# Patient Record
Sex: FEMALE | Race: BLACK OR AFRICAN AMERICAN | NOT HISPANIC OR LATINO | Employment: PART TIME | URBAN - METROPOLITAN AREA
[De-identification: names, ages, dates, MRNs, and addresses within clinical notes are randomized per-mention and may not be internally consistent; named-entity substitution may affect disease eponyms.]

---

## 2017-08-22 ENCOUNTER — RECORDS - HEALTHEAST (OUTPATIENT)
Dept: LAB | Facility: CLINIC | Age: 27
End: 2017-08-22

## 2017-08-23 LAB
CHOLEST SERPL-MCNC: 172 MG/DL
FASTING STATUS PATIENT QL REPORTED: YES
HBA1C MFR BLD: 5.9 % (ref 4.2–6.1)
HDLC SERPL-MCNC: 50 MG/DL
LDLC SERPL CALC-MCNC: 102 MG/DL
TRIGL SERPL-MCNC: 98 MG/DL

## 2017-09-06 ENCOUNTER — AMBULATORY - HEALTHEAST (OUTPATIENT)
Dept: ADMINISTRATIVE | Facility: REHABILITATION | Age: 27
End: 2017-09-06

## 2017-09-06 DIAGNOSIS — R29.898 DECONDITIONED LOW BACK: ICD-10-CM

## 2017-09-12 ENCOUNTER — OFFICE VISIT - HEALTHEAST (OUTPATIENT)
Dept: PHYSICAL THERAPY | Facility: REHABILITATION | Age: 27
End: 2017-09-12

## 2017-09-12 DIAGNOSIS — M62.81 MUSCLE WEAKNESS (GENERALIZED): ICD-10-CM

## 2017-09-12 DIAGNOSIS — G89.29 CHRONIC BILATERAL LOW BACK PAIN WITHOUT SCIATICA: ICD-10-CM

## 2017-09-12 DIAGNOSIS — M54.50 CHRONIC BILATERAL LOW BACK PAIN WITHOUT SCIATICA: ICD-10-CM

## 2017-09-20 ENCOUNTER — OFFICE VISIT - HEALTHEAST (OUTPATIENT)
Dept: PHYSICAL THERAPY | Facility: REHABILITATION | Age: 27
End: 2017-09-20

## 2017-09-20 DIAGNOSIS — M62.81 MUSCLE WEAKNESS (GENERALIZED): ICD-10-CM

## 2017-09-20 DIAGNOSIS — M54.50 CHRONIC BILATERAL LOW BACK PAIN WITHOUT SCIATICA: ICD-10-CM

## 2017-09-20 DIAGNOSIS — G89.29 CHRONIC BILATERAL LOW BACK PAIN WITHOUT SCIATICA: ICD-10-CM

## 2018-01-02 ENCOUNTER — TRANSFERRED RECORDS (OUTPATIENT)
Dept: HEALTH INFORMATION MANAGEMENT | Facility: CLINIC | Age: 28
End: 2018-01-02

## 2018-03-30 ENCOUNTER — TRANSFERRED RECORDS (OUTPATIENT)
Dept: HEALTH INFORMATION MANAGEMENT | Facility: CLINIC | Age: 28
End: 2018-03-30

## 2018-06-28 ENCOUNTER — AMBULATORY - HEALTHEAST (OUTPATIENT)
Dept: LAB | Facility: HOSPITAL | Age: 28
End: 2018-06-28

## 2018-06-28 DIAGNOSIS — Z13.1 SCREENING FOR DIABETES MELLITUS: ICD-10-CM

## 2018-06-28 DIAGNOSIS — Z79.899 HIGH RISK MEDICATIONS (NOT ANTICOAGULANTS) LONG-TERM USE: ICD-10-CM

## 2018-06-28 DIAGNOSIS — F33.1 MAJOR DEPRESSIVE DISORDER, RECURRENT EPISODE, MODERATE (H): ICD-10-CM

## 2018-06-28 LAB
ALBUMIN SERPL-MCNC: 3.7 G/DL (ref 3.5–5)
ALP SERPL-CCNC: 63 U/L (ref 45–120)
ALT SERPL W P-5'-P-CCNC: 16 U/L (ref 0–45)
AMMONIA PLAS-SCNC: 36 UMOL/L (ref 11–35)
AMYLASE SERPL-CCNC: 62 U/L (ref 5–120)
AST SERPL W P-5'-P-CCNC: 21 U/L (ref 0–40)
BASOPHILS # BLD AUTO: 0 THOU/UL (ref 0–0.2)
BASOPHILS NFR BLD AUTO: 0 % (ref 0–2)
BILIRUB DIRECT SERPL-MCNC: 0.1 MG/DL
BILIRUB SERPL-MCNC: 0.4 MG/DL (ref 0–1)
CHOLEST SERPL-MCNC: 190 MG/DL
EOSINOPHIL # BLD AUTO: 0.1 THOU/UL (ref 0–0.4)
EOSINOPHIL NFR BLD AUTO: 2 % (ref 0–6)
ERYTHROCYTE [DISTWIDTH] IN BLOOD BY AUTOMATED COUNT: 14.3 % (ref 11–14.5)
FASTING STATUS PATIENT QL REPORTED: YES
FASTING STATUS PATIENT QL REPORTED: YES
GLUCOSE BLD-MCNC: 76 MG/DL (ref 70–125)
HCT VFR BLD AUTO: 39.7 % (ref 35–47)
HDLC SERPL-MCNC: 52 MG/DL
HGB BLD-MCNC: 13.3 G/DL (ref 12–16)
LDLC SERPL CALC-MCNC: 114 MG/DL
LYMPHOCYTES # BLD AUTO: 2.6 THOU/UL (ref 0.8–4.4)
LYMPHOCYTES NFR BLD AUTO: 30 % (ref 20–40)
MCH RBC QN AUTO: 27.7 PG (ref 27–34)
MCHC RBC AUTO-ENTMCNC: 33.5 G/DL (ref 32–36)
MCV RBC AUTO: 83 FL (ref 80–100)
MONOCYTES # BLD AUTO: 0.5 THOU/UL (ref 0–0.9)
MONOCYTES NFR BLD AUTO: 6 % (ref 2–10)
NEUTROPHILS # BLD AUTO: 5.5 THOU/UL (ref 2–7.7)
NEUTROPHILS NFR BLD AUTO: 63 % (ref 50–70)
PLATELET # BLD AUTO: 230 THOU/UL (ref 140–440)
PMV BLD AUTO: 9.4 FL (ref 8.5–12.5)
PROLACTIN SERPL-MCNC: 5.4 NG/ML (ref 0–20)
PROT SERPL-MCNC: 7.5 G/DL (ref 6–8)
RBC # BLD AUTO: 4.8 MILL/UL (ref 3.8–5.4)
TRIGL SERPL-MCNC: 119 MG/DL
VALPROATE SERPL-MCNC: 74.7 UG/ML (ref 50–150)
WBC: 8.8 THOU/UL (ref 4–11)

## 2018-06-29 LAB — HBA1C MFR BLD: 5.6 % (ref 4.2–6.1)

## 2018-08-07 ENCOUNTER — TRANSFERRED RECORDS (OUTPATIENT)
Dept: HEALTH INFORMATION MANAGEMENT | Facility: CLINIC | Age: 28
End: 2018-08-07

## 2018-09-04 ENCOUNTER — RECORDS - HEALTHEAST (OUTPATIENT)
Dept: LAB | Facility: CLINIC | Age: 28
End: 2018-09-04

## 2018-09-04 ENCOUNTER — TRANSFERRED RECORDS (OUTPATIENT)
Dept: HEALTH INFORMATION MANAGEMENT | Facility: CLINIC | Age: 28
End: 2018-09-04

## 2018-09-04 ENCOUNTER — MEDICAL CORRESPONDENCE (OUTPATIENT)
Dept: HEALTH INFORMATION MANAGEMENT | Facility: CLINIC | Age: 28
End: 2018-09-04

## 2018-09-05 LAB — BACTERIA SPEC CULT: NORMAL

## 2018-10-08 NOTE — TELEPHONE ENCOUNTER
FUTURE VISIT INFORMATION      FUTURE VISIT INFORMATION:    Date: 10/23/2018    Time: 1:00    Location: [facility] [place]  REFERRAL INFORMATION:    Referring provider:  Dominique Madrigal    Referring providers clinic:  [facility]    Reason for visit/diagnosis  CONNIE    RECORDS REQUESTED FROM:       Clinic name Comments Records Status Imaging Status   ENTIRA OFFICE VISIT: 09/04/2018  SLEEP STUDY: 01/02/2018 EXTERNAL NONE   [facility] OFF[facility] VISIT: 10/08/2018 08/07/2018,05/08/2018,02/20/2018,  SLEEP STUDY: 03/30/2018 INTERNAL/  EXTERNAL NONE                             RECORDS STATUS    PUT RECORDS IN HIM BAG TO GET SCANNED.

## 2018-10-08 NOTE — TELEPHONE ENCOUNTER
Phone Call:     Contact Name [facility]   Outcome CALLED CLINIC WE GOT RECORDS NO REFERRAL ... PER CLINIC THEY NEVER REFERRED THIS PT TO US NOR DO THEY HAVE A DR BY THE NAME WALI VALDERRAMA

## 2018-10-08 NOTE — TELEPHONE ENCOUNTER
Phone Call:     Contact Name CALLED PT   Outcome LEFT PT A VM THAT WE WILL NEED A ENT REFERRAL IF WE DON'T GET ONE BY THE TIME OF YOUR APPT WE WILL NEED TO CANCEL THIS APPT     ERROR IF PT CALLS BACK DISREGARD INTAKE REP PUT WRONG CLINIC FOR DR VALDERRAMA... SHE IS WAS AT [facility] PER CLINIC SHE IS NO LONGER HERE BUT WILL FAX REFERRAL AND RECORDS

## 2018-10-23 ENCOUNTER — PRE VISIT (OUTPATIENT)
Dept: OTOLARYNGOLOGY | Facility: CLINIC | Age: 28
End: 2018-10-23

## 2018-11-20 ENCOUNTER — RECORDS - HEALTHEAST (OUTPATIENT)
Dept: ADMINISTRATIVE | Facility: OTHER | Age: 28
End: 2018-11-20

## 2018-12-11 ENCOUNTER — AMBULATORY - HEALTHEAST (OUTPATIENT)
Dept: LAB | Facility: HOSPITAL | Age: 28
End: 2018-12-11

## 2018-12-11 DIAGNOSIS — Z79.899 NEED FOR PROPHYLACTIC CHEMOTHERAPY: ICD-10-CM

## 2018-12-11 LAB
ALBUMIN SERPL-MCNC: 3.7 G/DL (ref 3.5–5)
ALP SERPL-CCNC: 54 U/L (ref 45–120)
ALT SERPL W P-5'-P-CCNC: 19 U/L (ref 0–45)
AMYLASE SERPL-CCNC: 90 U/L (ref 5–120)
AST SERPL W P-5'-P-CCNC: 19 U/L (ref 0–40)
BASOPHILS # BLD AUTO: 0 THOU/UL (ref 0–0.2)
BASOPHILS NFR BLD AUTO: 0 % (ref 0–2)
BILIRUB DIRECT SERPL-MCNC: <0.1 MG/DL
BILIRUB SERPL-MCNC: 0.2 MG/DL (ref 0–1)
EOSINOPHIL # BLD AUTO: 0.1 THOU/UL (ref 0–0.4)
EOSINOPHIL NFR BLD AUTO: 1 % (ref 0–6)
ERYTHROCYTE [DISTWIDTH] IN BLOOD BY AUTOMATED COUNT: 13.9 % (ref 11–14.5)
GGT SERPL-CCNC: 35 U/L (ref 0–50)
HCT VFR BLD AUTO: 39.2 % (ref 35–47)
HGB BLD-MCNC: 13.2 G/DL (ref 12–16)
LYMPHOCYTES # BLD AUTO: 2.3 THOU/UL (ref 0.8–4.4)
LYMPHOCYTES NFR BLD AUTO: 32 % (ref 20–40)
MCH RBC QN AUTO: 28 PG (ref 27–34)
MCHC RBC AUTO-ENTMCNC: 33.7 G/DL (ref 32–36)
MCV RBC AUTO: 83 FL (ref 80–100)
MONOCYTES # BLD AUTO: 0.6 THOU/UL (ref 0–0.9)
MONOCYTES NFR BLD AUTO: 8 % (ref 2–10)
NEUTROPHILS # BLD AUTO: 4.2 THOU/UL (ref 2–7.7)
NEUTROPHILS NFR BLD AUTO: 59 % (ref 50–70)
PLATELET # BLD AUTO: 228 THOU/UL (ref 140–440)
PMV BLD AUTO: 9.5 FL (ref 8.5–12.5)
PROT SERPL-MCNC: 6.9 G/DL (ref 6–8)
RBC # BLD AUTO: 4.71 MILL/UL (ref 3.8–5.4)
VALPROATE SERPL-MCNC: 78.5 UG/ML (ref 50–150)
WBC: 7.2 THOU/UL (ref 4–11)

## 2018-12-12 ASSESSMENT — MIFFLIN-ST. JEOR: SCORE: 1440.27

## 2018-12-13 ENCOUNTER — SURGERY - HEALTHEAST (OUTPATIENT)
Dept: SURGERY | Facility: HOSPITAL | Age: 28
End: 2018-12-13

## 2018-12-13 ENCOUNTER — ANESTHESIA - HEALTHEAST (OUTPATIENT)
Dept: SURGERY | Facility: HOSPITAL | Age: 28
End: 2018-12-13

## 2019-01-17 ENCOUNTER — OFFICE VISIT (OUTPATIENT)
Dept: OTOLARYNGOLOGY | Facility: CLINIC | Age: 29
End: 2019-01-17
Payer: MEDICARE

## 2019-01-17 VITALS — BODY MASS INDEX: 32.1 KG/M2 | WEIGHT: 170 LBS | HEIGHT: 61 IN

## 2019-01-17 DIAGNOSIS — G47.33 OSA (OBSTRUCTIVE SLEEP APNEA): Primary | ICD-10-CM

## 2019-01-17 PROBLEM — F79 INTELLECTUAL DISABILITY: Status: ACTIVE | Noted: 2017-09-14

## 2019-01-17 PROBLEM — F39 PSYCHOSIS, AFFECTIVE (H): Status: ACTIVE | Noted: 2017-09-14

## 2019-01-17 PROBLEM — F39 EPISODIC MOOD DISORDER (H): Status: ACTIVE | Noted: 2017-09-17

## 2019-01-17 PROBLEM — Z02.9 ADMINISTRATIVE ENCOUNTER: Status: ACTIVE | Noted: 2017-09-18

## 2019-01-17 PROBLEM — S09.90XA INJURY OF HEAD: Status: ACTIVE | Noted: 2017-09-16
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
## 2019-01-17 PROBLEM — F33.42 MAJOR DEPRESSION, RECURRENT, FULL REMISSION (H): Status: ACTIVE | Noted: 2019-01-17

## 2019-01-17 PROBLEM — F33.3 DEPRESSION, MAJOR, RECURRENT, SEVERE WITH PSYCHOSIS (H): Status: ACTIVE | Noted: 2017-09-14

## 2019-01-17 PROBLEM — R45.851 SUICIDAL IDEATION: Status: ACTIVE | Noted: 2017-09-13

## 2019-01-17 PROBLEM — F32.9 MAJOR DEPRESSIVE DISORDER: Status: ACTIVE | Noted: 2018-07-20

## 2019-01-17 RX ORDER — PRAZOSIN HYDROCHLORIDE 2 MG/1
2 CAPSULE ORAL
Status: ON HOLD | COMMUNITY
Start: 2017-07-21 | End: 2020-12-27

## 2019-01-17 RX ORDER — SERTRALINE HYDROCHLORIDE 100 MG/1
200 TABLET, FILM COATED ORAL AT BEDTIME
Status: ON HOLD | COMMUNITY
End: 2020-12-30

## 2019-01-17 RX ORDER — SENNA AND DOCUSATE SODIUM 50; 8.6 MG/1; MG/1
1 TABLET, FILM COATED ORAL AT BEDTIME
Status: ON HOLD | COMMUNITY
End: 2020-12-30

## 2019-01-17 RX ORDER — ALBUTEROL SULFATE 90 UG/1
2 AEROSOL, METERED RESPIRATORY (INHALATION) EVERY 4 HOURS PRN
Status: ON HOLD | COMMUNITY
End: 2020-12-30

## 2019-01-17 RX ORDER — PANTOPRAZOLE SODIUM 40 MG/1
40 TABLET, DELAYED RELEASE ORAL
Status: ON HOLD | COMMUNITY
Start: 2017-07-21 | End: 2020-12-30

## 2019-01-17 RX ORDER — MONTELUKAST SODIUM 10 MG/1
10 TABLET ORAL
Status: ON HOLD | COMMUNITY
Start: 2017-07-21 | End: 2020-12-30

## 2019-01-17 RX ORDER — DIVALPROEX SODIUM 500 MG/1
2000 TABLET, DELAYED RELEASE ORAL AT BEDTIME
Status: ON HOLD | COMMUNITY
End: 2020-12-30

## 2019-01-17 RX ORDER — ARIPIPRAZOLE 20 MG/1
30 TABLET ORAL AT BEDTIME
Status: ON HOLD | COMMUNITY
End: 2020-12-30

## 2019-01-17 RX ORDER — KETOCONAZOLE 20 MG/G
1 CREAM TOPICAL 2 TIMES DAILY PRN
COMMUNITY
Start: 2017-07-21

## 2019-01-17 RX ORDER — MEDROXYPROGESTERONE ACETATE 150 MG/ML
150 INJECTION, SUSPENSION INTRAMUSCULAR
Status: ON HOLD | COMMUNITY
End: 2020-12-30

## 2019-01-17 RX ORDER — ASPIRIN 81 MG
TABLET, DELAYED RELEASE (ENTERIC COATED) ORAL
Status: ON HOLD | COMMUNITY
Start: 2019-01-02 | End: 2020-12-27

## 2019-01-17 ASSESSMENT — MIFFLIN-ST. JEOR: SCORE: 1433.49

## 2019-01-17 ASSESSMENT — PATIENT HEALTH QUESTIONNAIRE - PHQ9
SUM OF ALL RESPONSES TO PHQ QUESTIONS 1-9: 10
10. IF YOU CHECKED OFF ANY PROBLEMS, HOW DIFFICULT HAVE THESE PROBLEMS MADE IT FOR YOU TO DO YOUR WORK, TAKE CARE OF THINGS AT HOME, OR GET ALONG WITH OTHER PEOPLE: SOMEWHAT DIFFICULT
SUM OF ALL RESPONSES TO PHQ QUESTIONS 1-9: 10

## 2019-01-17 NOTE — PROGRESS NOTES
SLEEP SURGERY CONSULTATION    Patient: Michelle Person (Sina)  : 1990  CHIEF COMPLAINT: CONNIE    HPI:  Michelle Person is a 28 year old year old female who has Obstructive Sleep Apnea.  Her biggest complaint about her sleep apnea is that she has fatigue and tiredness during the day.  She had a sleep study performed on 2018.  The overall sleep apnea was 6.4.  Supine AHI was 6.8.  Nonsupine AHI was 4.6.  She did not have any central events.  She slept 80% of the night on her back.  Patient did try CPAP.  She try it for approximately 2 months.  However she found the mass to be very hot and claustrophobic.  Eventually she did not meet criteria and therefore the CPAP machine was taken back.  Patient reports that she does like to sleep mostly on her back but she does toss and turn quite a bit through the night.  She does have some nasal congestion.  She is used Flonase in the past but only when she has had colds.        PAST MEDICAL HISTORY:  Developmental delay    PAST SURGICAL HISTORY:  No past surgical history on file.    MEDICATIONS:  Current Outpatient Medications   Medication Sig Dispense Refill     fluticasone (ARNUITY ELLIPTA) 100 MCG/ACT inhaler Inhale 1 puff into the lungs       ketoconazole (NIZORAL) 2 % external cream Apply 1 Application topically       montelukast (SINGULAIR) 10 MG tablet Take 10 mg by mouth       Omega-3 Fatty Acids (FISH OIL PO) Take 2 capsules by mouth       pantoprazole (PROTONIX) 40 MG EC tablet Take 40 mg by mouth       prazosin (MINIPRESS) 2 MG capsule Take 2 mg by mouth       albuterol (PROAIR HFA) 108 (90 Base) MCG/ACT inhaler Inhale 2 puffs into the lungs       ARIPiprazole (ABILIFY) 20 MG tablet Take 20 mg by mouth       cholecalciferol (VITAMIN D-1000 MAX ST) 1000 units TABS Take 1,000 Units by mouth       divalproex sodium delayed-release (DEPAKOTE) 500 MG DR tablet Take 500 mg by mouth       medroxyPROGESTERone (DEPO-PROVERA) 150 MG/ML IM injection Inject 150 mg  "into the muscle       SENNA PLUS 8.6-50 MG tablet        SENNA-docusate sodium (SENNA S) 8.6-50 MG tablet Take 1 tablet by mouth       sertraline (ZOLOFT) 100 MG tablet Take 200 mg by mouth         ALLERGIES:  Allergies   Allergen Reactions     Tomato Unknown       SOCIAL HISTORY:  Social History     Socioeconomic History     Marital status: Single     Spouse name: Not on file     Number of children: Not on file     Years of education: Not on file     Highest education level: Not on file   Social Needs     Financial resource strain: Not on file     Food insecurity - worry: Not on file     Food insecurity - inability: Not on file     Transportation needs - medical: Not on file     Transportation needs - non-medical: Not on file   Occupational History     Not on file   Tobacco Use     Smoking status: Not on file   Substance and Sexual Activity     Alcohol use: Not on file     Drug use: Not on file     Sexual activity: Not on file   Other Topics Concern     Not on file   Social History Narrative     Not on file       FAMILY HISTORY:  No family history on file.    REVIEW OF SYSTEMS:  [facility] E[facility] ROS 1/17/2019   Constitutional Weight loss   Neurology Headache   Psychology Frequently feeling depressed or sad, Frequently feeling anxious   Ears, Nose, Throat Nasal congestion or drainage, Sore throat, Trouble swallowing, Hoarseness   Cardiopulmonary Cough, Breathing problems, Wheezing, Chest pain   Gastrointestinal/Genitourinary Heartburn/indigestion, Pain with urination, Constipation   Musculoskeletal Back pain, Swollen legs/feet   Endocrine Thirst, Heat or cold intolerance, Frequent urination         PHYSICAL EXAM  Ht 1.549 m (5' 1\")   Wt 77.1 kg (170 lb)   BMI 32.12 kg/m      Constitutional: healthy, alert and no distress  ENT:   NOSE: Septum is fairly midline with a slight deviation to the left.  Turbinates are moderately hypertrophied today.  MOUTH:   Modified Jac Jessica 4.  Tonsils are 2+.  She does have a narrow hard " palate.    ASSESSMENT:  1.  Mild obstructive sleep apnea,  untreated      PLAN:  I discussed with the patient alternative management options for obstructive sleep apnea if one is not able to use CPAP.  For her her options include positional therapy, oral appliance therapy, or surgery.  In terms of surgery potentially we could do turbinate reductions and a soft palate procedure.  However based off her anatomy I suspect that the surgical success rate would be around the 50% range.  She might do well with a oral appliance given the mildness of her sleep apnea.  She was not interested in positional therapy at this time.  After discussion with the patient and her  patient is electing to try an oral appliance first before considering surgery.  I did make a referral for her to several dentist who are able to accept Medicare.  I spent 35 minutes face-to-face with Michelle Person during today's office visit, of which more than 50% was spent on counseling and coordination of care, which included discussion of pathophysiology of patient's obstructive sleep apnea, treatment options, risks and benefits of each option.

## 2019-01-17 NOTE — LETTER
2019       RE: Michelle Person  [address]  [facility] [address]     Dear Colleague,    Thank you for referring your patient, Michelle Person, to the [facility] at [facility]. Please see a copy of my visit note below.        SLEEP SURGERY CONSULTATION    Patient: Michelle Person (Sina)  : 1990  CHIEF COMPLAINT: CONNIE    HPI:  Michelle Person is a 28 year old year old female who has Obstructive Sleep Apnea.  Her biggest complaint about her sleep apnea is that she has fatigue and tiredness during the day.  She had a sleep study performed on 2018.  The overall sleep apnea was 6.4.  Supine AHI was 6.8.  Nonsupine AHI was 4.6.  She did not have any central events.  She slept 80% of the night on her back.  Patient did try CPAP.  She try it for approximately 2 months.  However she found the mass to be very hot and claustrophobic.  Eventually she did not meet criteria and therefore the CPAP machine was taken back.  Patient reports that she does like to sleep mostly on her back but she does toss and turn quite a bit through the night.  She does have some nasal congestion.  She is used Flonase in the past but only when she has had colds.        PAST MEDICAL HISTORY:  Developmental delay    PAST SURGICAL HISTORY:  No past surgical history on file.    MEDICATIONS:  Current Outpatient Medications   Medication Sig Dispense Refill     fluticasone (ARNUITY ELLIPTA) 100 MCG/ACT inhaler Inhale 1 puff into the lungs       ketoconazole (NIZORAL) 2 % external cream Apply 1 Application topically       montelukast (SINGULAIR) 10 MG tablet Take 10 mg by mouth       Omega-3 Fatty Acids (FISH OIL PO) Take 2 capsules by mouth       pantoprazole (PROTONIX) 40 MG EC tablet Take 40 mg by mouth       prazosin (MINIPRESS) 2 MG capsule Take 2 mg by mouth       albuterol (PROAIR HFA) 108 (90 Base) MCG/ACT inhaler Inhale 2 puffs into the lungs       ARIPiprazole (ABILIFY) 20  "MG tablet Take 20 mg by mouth       cholecalciferol (VITAMIN D-1000 MAX ST) 1000 units TABS Take 1,000 Units by mouth       divalproex sodium delayed-release (DEPAKOTE) 500 MG DR tablet Take 500 mg by mouth       medroxyPROGESTERone (DEPO-PROVERA) 150 MG/ML IM injection Inject 150 mg into the muscle       SENNA PLUS 8.6-50 MG tablet        SENNA-docusate sodium (SENNA S) 8.6-50 MG tablet Take 1 tablet by mouth       sertraline (ZOLOFT) 100 MG tablet Take 200 mg by mouth         ALLERGIES:  Allergies   Allergen Reactions     Tomato Unknown       SOCIAL HISTORY:  Social History     Socioeconomic History     Marital status: Single     Spouse name: Not on file     Number of children: Not on file     Years of education: Not on file     Highest education level: Not on file   Social Needs     Financial resource strain: Not on file     Food insecurity - worry: Not on file     Food insecurity - inability: Not on file     Transportation needs - medical: Not on file     Transportation needs - non-medical: Not on file   Occupational History     Not on file   Tobacco Use     Smoking status: Not on file   Substance and Sexual Activity     Alcohol use: Not on file     Drug use: Not on file     Sexual activity: Not on file   Other Topics Concern     Not on file   Social History Narrative     Not on file       FAMILY HISTORY:  No family history on file.    REVIEW OF SYSTEMS:  [facility] E[facility] ROS 1/17/2019   Constitutional Weight loss   Neurology Headache   Psychology Frequently feeling depressed or sad, Frequently feeling anxious   Ears, Nose, Throat Nasal congestion or drainage, Sore throat, Trouble swallowing, Hoarseness   Cardiopulmonary Cough, Breathing problems, Wheezing, Chest pain   Gastrointestinal/Genitourinary Heartburn/indigestion, Pain with urination, Constipation   Musculoskeletal Back pain, Swollen legs/feet   Endocrine Thirst, Heat or cold intolerance, Frequent urination         PHYSICAL EXAM  Ht 1.549 m (5' 1\")   Wt 77.1 kg " (170 lb)   BMI 32.12 kg/m       Constitutional: healthy, alert and no distress  ENT:   NOSE: Septum is fairly midline with a slight deviation to the left.  Turbinates are moderately hypertrophied today.  MOUTH:   Modified Jac Jessica 4.  Tonsils are 2+.  She does have a narrow hard palate.    ASSESSMENT:  1.  Mild obstructive sleep apnea,  untreated      PLAN:  I discussed with the patient alternative management options for obstructive sleep apnea if one is not able to use CPAP.  For her her options include positional therapy, oral appliance therapy, or surgery.  In terms of surgery potentially we could do turbinate reductions and a soft palate procedure.  However based off her anatomy I suspect that the surgical success rate would be around the 50% range.  She might do well with a oral appliance given the mildness of her sleep apnea.  She was not interested in positional therapy at this time.  After discussion with the patient and her  patient is electing to try an oral appliance first before considering surgery.  I did make a referral for her to several dentist who are able to accept Medicare.  I spent 35 minutes face-to-face with Michelle Person during today's office visit, of which more than 50% was spent on counseling and coordination of care, which included discussion of pathophysiology of patient's obstructive sleep apnea, treatment options, risks and benefits of each option.          Again, thank you for allowing me to participate in the care of your patient.      Sincerely,    Dorota Adams MD

## 2019-01-18 ASSESSMENT — PATIENT HEALTH QUESTIONNAIRE - PHQ9: SUM OF ALL RESPONSES TO PHQ QUESTIONS 1-9: 10

## 2019-02-14 ENCOUNTER — TRANSFERRED RECORDS (OUTPATIENT)
Dept: HEALTH INFORMATION MANAGEMENT | Facility: CLINIC | Age: 29
End: 2019-02-14

## 2019-03-19 ENCOUNTER — DOCUMENTATION ONLY (OUTPATIENT)
Dept: OTOLARYNGOLOGY | Facility: CLINIC | Age: 29
End: 2019-03-19

## 2019-03-19 NOTE — PROGRESS NOTES
Called [facility] to confirm fax was received. [facility] confirms they received documents.     Yadira Haddad RN

## 2019-06-06 ENCOUNTER — AMBULATORY - HEALTHEAST (OUTPATIENT)
Dept: LAB | Facility: HOSPITAL | Age: 29
End: 2019-06-06

## 2019-06-06 DIAGNOSIS — Z79.899 HISTORY OF LONG-TERM TREATMENT WITH HIGH-RISK MEDICATION: ICD-10-CM

## 2019-06-06 DIAGNOSIS — Z13.21 SCREENING FOR MALNUTRITION: ICD-10-CM

## 2019-06-06 LAB
ALBUMIN SERPL-MCNC: 3.7 G/DL (ref 3.5–5)
ALP SERPL-CCNC: 46 U/L (ref 45–120)
ALT SERPL W P-5'-P-CCNC: 19 U/L (ref 0–45)
AMYLASE SERPL-CCNC: 58 U/L (ref 5–120)
AST SERPL W P-5'-P-CCNC: 19 U/L (ref 0–40)
BASOPHILS # BLD AUTO: 0 THOU/UL (ref 0–0.2)
BASOPHILS NFR BLD AUTO: 0 % (ref 0–2)
BILIRUB DIRECT SERPL-MCNC: 0.1 MG/DL
BILIRUB SERPL-MCNC: 0.3 MG/DL (ref 0–1)
CHOLEST SERPL-MCNC: 166 MG/DL
EOSINOPHIL # BLD AUTO: 0.1 THOU/UL (ref 0–0.4)
EOSINOPHIL NFR BLD AUTO: 2 % (ref 0–6)
ERYTHROCYTE [DISTWIDTH] IN BLOOD BY AUTOMATED COUNT: 13.9 % (ref 11–14.5)
FASTING STATUS PATIENT QL REPORTED: YES
FASTING STATUS PATIENT QL REPORTED: YES
GGT SERPL-CCNC: 26 U/L (ref 0–50)
GLUCOSE BLD-MCNC: 77 MG/DL (ref 70–125)
HCT VFR BLD AUTO: 41.7 % (ref 35–47)
HDLC SERPL-MCNC: 52 MG/DL
HGB BLD-MCNC: 13.7 G/DL (ref 12–16)
LDLC SERPL CALC-MCNC: 97 MG/DL
LYMPHOCYTES # BLD AUTO: 2.5 THOU/UL (ref 0.8–4.4)
LYMPHOCYTES NFR BLD AUTO: 34 % (ref 20–40)
MCH RBC QN AUTO: 27.8 PG (ref 27–34)
MCHC RBC AUTO-ENTMCNC: 32.9 G/DL (ref 32–36)
MCV RBC AUTO: 85 FL (ref 80–100)
MONOCYTES # BLD AUTO: 0.6 THOU/UL (ref 0–0.9)
MONOCYTES NFR BLD AUTO: 8 % (ref 2–10)
NEUTROPHILS # BLD AUTO: 4 THOU/UL (ref 2–7.7)
NEUTROPHILS NFR BLD AUTO: 56 % (ref 50–70)
PLATELET # BLD AUTO: 229 THOU/UL (ref 140–440)
PMV BLD AUTO: 9.1 FL (ref 8.5–12.5)
PROT SERPL-MCNC: 6.9 G/DL (ref 6–8)
RBC # BLD AUTO: 4.93 MILL/UL (ref 3.8–5.4)
T4 FREE SERPL-MCNC: 0.8 NG/DL (ref 0.7–1.8)
TRIGL SERPL-MCNC: 86 MG/DL
TSH SERPL DL<=0.005 MIU/L-ACNC: 4.1 UIU/ML (ref 0.3–5)
VALPROATE SERPL-MCNC: 68.2 UG/ML (ref 50–150)
WBC: 7.2 THOU/UL (ref 4–11)

## 2019-06-07 LAB — HBA1C MFR BLD: 5.8 % (ref 4.2–6.1)

## 2019-09-06 ENCOUNTER — AMBULATORY - HEALTHEAST (OUTPATIENT)
Dept: LAB | Facility: HOSPITAL | Age: 29
End: 2019-09-06

## 2019-09-06 DIAGNOSIS — Z11.1 SCREENING EXAMINATION FOR PULMONARY TUBERCULOSIS: ICD-10-CM

## 2019-09-11 LAB
GAMMA INTERFERON BACKGROUND BLD IA-ACNC: 0.05 IU/ML
M TB IFN-G BLD-IMP: NEGATIVE
MITOGEN IGNF BCKGRD COR BLD-ACNC: 0 IU/ML
MITOGEN IGNF BCKGRD COR BLD-ACNC: 0.01 IU/ML
QTF INTERPRETATION: NORMAL
QTF MITOGEN - NIL: >10 IU/ML

## 2019-12-09 ENCOUNTER — RECORDS - HEALTHEAST (OUTPATIENT)
Dept: ADMINISTRATIVE | Facility: OTHER | Age: 29
End: 2019-12-09

## 2020-01-03 ENCOUNTER — AMBULATORY - HEALTHEAST (OUTPATIENT)
Dept: LAB | Facility: HOSPITAL | Age: 30
End: 2020-01-03

## 2020-01-03 DIAGNOSIS — Z79.899 HISTORY OF LONG-TERM TREATMENT WITH HIGH-RISK MEDICATION: ICD-10-CM

## 2020-01-03 LAB
ALBUMIN SERPL-MCNC: 3.4 G/DL (ref 3.5–5)
ALP SERPL-CCNC: 47 U/L (ref 45–120)
ALT SERPL W P-5'-P-CCNC: 24 U/L (ref 0–45)
AMYLASE SERPL-CCNC: 58 U/L (ref 5–120)
AST SERPL W P-5'-P-CCNC: 25 U/L (ref 0–40)
BASOPHILS # BLD AUTO: 0 THOU/UL (ref 0–0.2)
BASOPHILS NFR BLD AUTO: 0 % (ref 0–2)
BILIRUB DIRECT SERPL-MCNC: 0.1 MG/DL
BILIRUB SERPL-MCNC: 0.3 MG/DL (ref 0–1)
EOSINOPHIL # BLD AUTO: 0.1 THOU/UL (ref 0–0.4)
EOSINOPHIL NFR BLD AUTO: 1 % (ref 0–6)
ERYTHROCYTE [DISTWIDTH] IN BLOOD BY AUTOMATED COUNT: 14 % (ref 11–14.5)
HCT VFR BLD AUTO: 40.9 % (ref 35–47)
HGB BLD-MCNC: 13.2 G/DL (ref 12–16)
LYMPHOCYTES # BLD AUTO: 2.4 THOU/UL (ref 0.8–4.4)
LYMPHOCYTES NFR BLD AUTO: 38 % (ref 20–40)
MCH RBC QN AUTO: 28 PG (ref 27–34)
MCHC RBC AUTO-ENTMCNC: 32.3 G/DL (ref 32–36)
MCV RBC AUTO: 87 FL (ref 80–100)
MONOCYTES # BLD AUTO: 0.5 THOU/UL (ref 0–0.9)
MONOCYTES NFR BLD AUTO: 8 % (ref 2–10)
NEUTROPHILS # BLD AUTO: 3.3 THOU/UL (ref 2–7.7)
NEUTROPHILS NFR BLD AUTO: 52 % (ref 50–70)
PLATELET # BLD AUTO: 279 THOU/UL (ref 140–440)
PMV BLD AUTO: 8.7 FL (ref 8.5–12.5)
PROT SERPL-MCNC: 6.6 G/DL (ref 6–8)
RBC # BLD AUTO: 4.71 MILL/UL (ref 3.8–5.4)
VALPROATE SERPL-MCNC: 86.7 UG/ML (ref 50–150)
WBC: 6.3 THOU/UL (ref 4–11)

## 2020-07-22 ENCOUNTER — AMBULATORY - HEALTHEAST (OUTPATIENT)
Dept: LAB | Facility: HOSPITAL | Age: 30
End: 2020-07-22

## 2020-07-22 DIAGNOSIS — Z79.899 ENCOUNTER FOR LONG-TERM (CURRENT) USE OF HIGH-RISK MEDICATION: ICD-10-CM

## 2020-07-22 LAB
ALBUMIN SERPL-MCNC: 3.8 G/DL (ref 3.5–5)
ALP SERPL-CCNC: 49 U/L (ref 45–120)
ALT SERPL W P-5'-P-CCNC: 21 U/L (ref 0–45)
AMYLASE SERPL-CCNC: 62 U/L (ref 5–120)
AST SERPL W P-5'-P-CCNC: 20 U/L (ref 0–40)
BASOPHILS # BLD AUTO: 0 THOU/UL (ref 0–0.2)
BASOPHILS NFR BLD AUTO: 0 % (ref 0–2)
BILIRUB DIRECT SERPL-MCNC: 0.1 MG/DL
BILIRUB SERPL-MCNC: 0.2 MG/DL (ref 0–1)
EOSINOPHIL # BLD AUTO: 0.2 THOU/UL (ref 0–0.4)
EOSINOPHIL NFR BLD AUTO: 2 % (ref 0–6)
ERYTHROCYTE [DISTWIDTH] IN BLOOD BY AUTOMATED COUNT: 14.4 % (ref 11–14.5)
GGT SERPL-CCNC: 37 U/L (ref 0–50)
HCT VFR BLD AUTO: 43.1 % (ref 35–47)
HGB BLD-MCNC: 14 G/DL (ref 12–16)
LYMPHOCYTES # BLD AUTO: 3 THOU/UL (ref 0.8–4.4)
LYMPHOCYTES NFR BLD AUTO: 35 % (ref 20–40)
MCH RBC QN AUTO: 28.3 PG (ref 27–34)
MCHC RBC AUTO-ENTMCNC: 32.5 G/DL (ref 32–36)
MCV RBC AUTO: 87 FL (ref 80–100)
MONOCYTES # BLD AUTO: 0.6 THOU/UL (ref 0–0.9)
MONOCYTES NFR BLD AUTO: 7 % (ref 2–10)
NEUTROPHILS # BLD AUTO: 4.5 THOU/UL (ref 2–7.7)
NEUTROPHILS NFR BLD AUTO: 54 % (ref 50–70)
PLATELET # BLD AUTO: 193 THOU/UL (ref 140–440)
PMV BLD AUTO: 9.5 FL (ref 8.5–12.5)
PROT SERPL-MCNC: 6.9 G/DL (ref 6–8)
PROT SERPL-MCNC: 6.9 G/DL (ref 6–8)
RBC # BLD AUTO: 4.94 MILL/UL (ref 3.8–5.4)
VALPROATE SERPL-MCNC: 92.6 UG/ML (ref 50–150)
WBC: 8.4 THOU/UL (ref 4–11)

## 2020-08-24 ENCOUNTER — COMMUNICATION - HEALTHEAST (OUTPATIENT)
Dept: SCHEDULING | Facility: CLINIC | Age: 30
End: 2020-08-24

## 2020-08-25 ENCOUNTER — AMBULATORY - HEALTHEAST (OUTPATIENT)
Dept: CARE COORDINATION | Facility: CLINIC | Age: 30
End: 2020-08-25

## 2020-08-25 DIAGNOSIS — A41.9 SEPSIS WITH ACUTE HYPOXIC RESPIRATORY FAILURE WITHOUT SEPTIC SHOCK, DUE TO UNSPECIFIED ORGANISM (H): ICD-10-CM

## 2020-08-25 DIAGNOSIS — R65.20 SEPSIS WITH ACUTE HYPOXIC RESPIRATORY FAILURE WITHOUT SEPTIC SHOCK, DUE TO UNSPECIFIED ORGANISM (H): ICD-10-CM

## 2020-08-25 DIAGNOSIS — J96.01 SEPSIS WITH ACUTE HYPOXIC RESPIRATORY FAILURE WITHOUT SEPTIC SHOCK, DUE TO UNSPECIFIED ORGANISM (H): ICD-10-CM

## 2020-09-01 ENCOUNTER — COMMUNICATION - HEALTHEAST (OUTPATIENT)
Dept: NURSING | Facility: CLINIC | Age: 30
End: 2020-09-01

## 2020-09-02 ENCOUNTER — RECORDS - HEALTHEAST (OUTPATIENT)
Dept: LAB | Facility: CLINIC | Age: 30
End: 2020-09-02

## 2020-09-03 LAB
ANION GAP SERPL CALCULATED.3IONS-SCNC: 13 MMOL/L (ref 5–18)
BUN SERPL-MCNC: 14 MG/DL (ref 8–22)
CALCIUM SERPL-MCNC: 9.3 MG/DL (ref 8.5–10.5)
CHLORIDE BLD-SCNC: 107 MMOL/L (ref 98–107)
CO2 SERPL-SCNC: 21 MMOL/L (ref 22–31)
CREAT SERPL-MCNC: 1.36 MG/DL (ref 0.6–1.1)
GFR SERPL CREATININE-BSD FRML MDRD: 45 ML/MIN/1.73M2
GLUCOSE BLD-MCNC: 100 MG/DL (ref 70–125)
POTASSIUM BLD-SCNC: 5.2 MMOL/L (ref 3.5–5)
SODIUM SERPL-SCNC: 141 MMOL/L (ref 136–145)

## 2020-09-08 ENCOUNTER — COMMUNICATION - HEALTHEAST (OUTPATIENT)
Dept: CARE COORDINATION | Facility: CLINIC | Age: 30
End: 2020-09-08

## 2020-09-08 ASSESSMENT — ACTIVITIES OF DAILY LIVING (ADL)
DEPENDENT_IADLS:: CLEANING;COOKING;LAUNDRY;SHOPPING;MONEY MANAGEMENT;MEDICATION MANAGEMENT;MEAL PREPARATION;TRANSPORTATION

## 2020-09-22 ENCOUNTER — COMMUNICATION - HEALTHEAST (OUTPATIENT)
Dept: CARE COORDINATION | Facility: CLINIC | Age: 30
End: 2020-09-22

## 2020-10-06 ENCOUNTER — COMMUNICATION - HEALTHEAST (OUTPATIENT)
Dept: CARE COORDINATION | Facility: CLINIC | Age: 30
End: 2020-10-06

## 2020-10-07 ENCOUNTER — RECORDS - HEALTHEAST (OUTPATIENT)
Dept: LAB | Facility: CLINIC | Age: 30
End: 2020-10-07

## 2020-10-07 LAB
ANION GAP SERPL CALCULATED.3IONS-SCNC: 7 MMOL/L (ref 5–18)
BUN SERPL-MCNC: 17 MG/DL (ref 8–22)
CALCIUM SERPL-MCNC: 8.9 MG/DL (ref 8.5–10.5)
CHLORIDE BLD-SCNC: 102 MMOL/L (ref 98–107)
CO2 SERPL-SCNC: 28 MMOL/L (ref 22–31)
CREAT SERPL-MCNC: 0.94 MG/DL (ref 0.6–1.1)
GFR SERPL CREATININE-BSD FRML MDRD: >60 ML/MIN/1.73M2
GLUCOSE BLD-MCNC: 73 MG/DL (ref 70–125)
POTASSIUM BLD-SCNC: 4.2 MMOL/L (ref 3.5–5)
SODIUM SERPL-SCNC: 137 MMOL/L (ref 136–145)

## 2020-10-13 ENCOUNTER — COMMUNICATION - HEALTHEAST (OUTPATIENT)
Dept: SCHEDULING | Facility: CLINIC | Age: 30
End: 2020-10-13

## 2020-12-26 ENCOUNTER — AMBULATORY - HEALTHEAST (OUTPATIENT)
Dept: BEHAVIORAL HEALTH | Facility: CLINIC | Age: 30
End: 2020-12-26

## 2020-12-26 ENCOUNTER — HOSPITAL ENCOUNTER (INPATIENT)
Facility: CLINIC | Age: 30
LOS: 6 days | Discharge: NURSING, REHAB OR LONG-TERM CARE | DRG: 885 | End: 2021-01-01
Attending: PSYCHIATRY & NEUROLOGY | Admitting: PSYCHIATRY & NEUROLOGY
Payer: MEDICARE

## 2020-12-26 ENCOUNTER — TRANSFERRED RECORDS (OUTPATIENT)
Dept: HEALTH INFORMATION MANAGEMENT | Facility: CLINIC | Age: 30
End: 2020-12-26

## 2020-12-26 DIAGNOSIS — G40.909 SEIZURE DISORDER (H): ICD-10-CM

## 2020-12-26 DIAGNOSIS — K59.03 DRUG-INDUCED CONSTIPATION: ICD-10-CM

## 2020-12-26 DIAGNOSIS — R45.851 SUICIDAL IDEATION: ICD-10-CM

## 2020-12-26 DIAGNOSIS — N32.81 OVERACTIVE BLADDER: ICD-10-CM

## 2020-12-26 DIAGNOSIS — J45.909 UNCOMPLICATED ASTHMA, UNSPECIFIED ASTHMA SEVERITY, UNSPECIFIED WHETHER PERSISTENT: Primary | ICD-10-CM

## 2020-12-26 DIAGNOSIS — F39 PSYCHOSIS, AFFECTIVE (H): ICD-10-CM

## 2020-12-26 DIAGNOSIS — E55.9 VITAMIN D DEFICIENCY: ICD-10-CM

## 2020-12-26 DIAGNOSIS — Z30.9 ENCOUNTER FOR CONTRACEPTIVE MANAGEMENT, UNSPECIFIED TYPE: ICD-10-CM

## 2020-12-26 DIAGNOSIS — K27.9 PEPTIC ULCER: ICD-10-CM

## 2020-12-26 DIAGNOSIS — F33.3 DEPRESSION, MAJOR, RECURRENT, SEVERE WITH PSYCHOSIS (H): ICD-10-CM

## 2020-12-26 LAB
CREAT SERPL-MCNC: 0.94 MG/DL (ref 0.6–1.1)
GFR SERPL CREATININE-BSD FRML MDRD: >60 ML/MIN/1.73M2
GLUCOSE SERPL-MCNC: 98 MG/DL (ref 70–125)
POTASSIUM SERPL-SCNC: 4.4 MMOL/L (ref 3.5–5)

## 2020-12-26 PROCEDURE — 250N000013 HC RX MED GY IP 250 OP 250 PS 637: Performed by: STUDENT IN AN ORGANIZED HEALTH CARE EDUCATION/TRAINING PROGRAM

## 2020-12-26 PROCEDURE — 124N000002 HC R&B MH UMMC

## 2020-12-26 RX ORDER — ACETAMINOPHEN 325 MG/1
650 TABLET ORAL EVERY 4 HOURS PRN
Status: DISCONTINUED | OUTPATIENT
Start: 2020-12-26 | End: 2021-01-01 | Stop reason: HOSPADM

## 2020-12-26 RX ORDER — OLANZAPINE 10 MG/2ML
10 INJECTION, POWDER, FOR SOLUTION INTRAMUSCULAR 3 TIMES DAILY PRN
Status: DISCONTINUED | OUTPATIENT
Start: 2020-12-26 | End: 2021-01-01 | Stop reason: HOSPADM

## 2020-12-26 RX ORDER — ARIPIPRAZOLE 10 MG/1
30 TABLET ORAL AT BEDTIME
Status: DISCONTINUED | OUTPATIENT
Start: 2020-12-26 | End: 2020-12-27

## 2020-12-26 RX ORDER — DIVALPROEX SODIUM 500 MG/1
500 TABLET, DELAYED RELEASE ORAL
Status: DISCONTINUED | OUTPATIENT
Start: 2020-12-27 | End: 2020-12-27

## 2020-12-26 RX ORDER — LANOLIN ALCOHOL/MO/W.PET/CERES
3 CREAM (GRAM) TOPICAL
Status: DISCONTINUED | OUTPATIENT
Start: 2020-12-26 | End: 2021-01-01 | Stop reason: HOSPADM

## 2020-12-26 RX ORDER — MAGNESIUM HYDROXIDE/ALUMINUM HYDROXICE/SIMETHICONE 120; 1200; 1200 MG/30ML; MG/30ML; MG/30ML
30 SUSPENSION ORAL EVERY 4 HOURS PRN
Status: DISCONTINUED | OUTPATIENT
Start: 2020-12-26 | End: 2021-01-01 | Stop reason: HOSPADM

## 2020-12-26 RX ORDER — POLYETHYLENE GLYCOL 3350 17 G/17G
17 POWDER, FOR SOLUTION ORAL DAILY PRN
Status: DISCONTINUED | OUTPATIENT
Start: 2020-12-26 | End: 2021-01-01 | Stop reason: HOSPADM

## 2020-12-26 RX ORDER — OLANZAPINE 10 MG/1
10 TABLET ORAL 3 TIMES DAILY PRN
Status: DISCONTINUED | OUTPATIENT
Start: 2020-12-26 | End: 2021-01-01 | Stop reason: HOSPADM

## 2020-12-26 RX ORDER — VITAMIN B COMPLEX
1000 TABLET ORAL DAILY
Status: DISCONTINUED | OUTPATIENT
Start: 2020-12-27 | End: 2021-01-01 | Stop reason: HOSPADM

## 2020-12-26 RX ORDER — HYDROXYZINE HYDROCHLORIDE 25 MG/1
25 TABLET, FILM COATED ORAL EVERY 4 HOURS PRN
Status: DISCONTINUED | OUTPATIENT
Start: 2020-12-26 | End: 2021-01-01 | Stop reason: HOSPADM

## 2020-12-26 RX ORDER — DIVALPROEX SODIUM 500 MG/1
1500 TABLET, DELAYED RELEASE ORAL AT BEDTIME
Status: DISCONTINUED | OUTPATIENT
Start: 2020-12-26 | End: 2020-12-27

## 2020-12-26 RX ORDER — ALBUTEROL SULFATE 90 UG/1
2 AEROSOL, METERED RESPIRATORY (INHALATION) DAILY PRN
Status: DISCONTINUED | OUTPATIENT
Start: 2020-12-26 | End: 2020-12-31

## 2020-12-26 RX ORDER — PANTOPRAZOLE SODIUM 40 MG/1
40 TABLET, DELAYED RELEASE ORAL DAILY
Status: DISCONTINUED | OUTPATIENT
Start: 2020-12-27 | End: 2021-01-01 | Stop reason: HOSPADM

## 2020-12-26 RX ORDER — SERTRALINE HYDROCHLORIDE 100 MG/1
200 TABLET, FILM COATED ORAL AT BEDTIME
Status: DISCONTINUED | OUTPATIENT
Start: 2020-12-26 | End: 2020-12-27

## 2020-12-26 RX ADMIN — DIVALPROEX SODIUM 1500 MG: 500 TABLET, DELAYED RELEASE ORAL at 22:52

## 2020-12-26 RX ADMIN — SERTRALINE HYDROCHLORIDE 200 MG: 100 TABLET ORAL at 22:53

## 2020-12-26 RX ADMIN — ARIPIPRAZOLE 30 MG: 10 TABLET ORAL at 22:53

## 2020-12-27 LAB
ALBUMIN SERPL-MCNC: 3 G/DL (ref 3.4–5)
ALP SERPL-CCNC: 44 U/L (ref 40–150)
ALT SERPL W P-5'-P-CCNC: 18 U/L (ref 0–50)
ANION GAP SERPL CALCULATED.3IONS-SCNC: 4 MMOL/L (ref 3–14)
AST SERPL W P-5'-P-CCNC: 14 U/L (ref 0–45)
BILIRUB SERPL-MCNC: 0.2 MG/DL (ref 0.2–1.3)
BUN SERPL-MCNC: 17 MG/DL (ref 7–30)
CALCIUM SERPL-MCNC: 8.8 MG/DL (ref 8.5–10.1)
CHLORIDE SERPL-SCNC: 108 MMOL/L (ref 94–109)
CO2 SERPL-SCNC: 29 MMOL/L (ref 20–32)
CREAT SERPL-MCNC: 0.9 MG/DL (ref 0.52–1.04)
ERYTHROCYTE [DISTWIDTH] IN BLOOD BY AUTOMATED COUNT: 12.8 % (ref 10–15)
GFR SERPL CREATININE-BSD FRML MDRD: 85 ML/MIN/{1.73_M2}
GLUCOSE SERPL-MCNC: 79 MG/DL (ref 70–99)
HCT VFR BLD AUTO: 35.3 % (ref 35–47)
HGB BLD-MCNC: 11.7 G/DL (ref 11.7–15.7)
MCH RBC QN AUTO: 28.2 PG (ref 26.5–33)
MCHC RBC AUTO-ENTMCNC: 33.1 G/DL (ref 31.5–36.5)
MCV RBC AUTO: 85 FL (ref 78–100)
PLATELET # BLD AUTO: 238 10E9/L (ref 150–450)
POTASSIUM SERPL-SCNC: 4.7 MMOL/L (ref 3.4–5.3)
PROT SERPL-MCNC: 6.4 G/DL (ref 6.8–8.8)
RBC # BLD AUTO: 4.15 10E12/L (ref 3.8–5.2)
SODIUM SERPL-SCNC: 141 MMOL/L (ref 133–144)
TSH SERPL DL<=0.005 MIU/L-ACNC: 3.48 MU/L (ref 0.4–4)
WBC # BLD AUTO: 8.7 10E9/L (ref 4–11)

## 2020-12-27 PROCEDURE — 36415 COLL VENOUS BLD VENIPUNCTURE: CPT | Performed by: STUDENT IN AN ORGANIZED HEALTH CARE EDUCATION/TRAINING PROGRAM

## 2020-12-27 PROCEDURE — 85027 COMPLETE CBC AUTOMATED: CPT | Performed by: STUDENT IN AN ORGANIZED HEALTH CARE EDUCATION/TRAINING PROGRAM

## 2020-12-27 PROCEDURE — 99223 1ST HOSP IP/OBS HIGH 75: CPT | Mod: AI | Performed by: PSYCHIATRY & NEUROLOGY

## 2020-12-27 PROCEDURE — 84443 ASSAY THYROID STIM HORMONE: CPT | Performed by: STUDENT IN AN ORGANIZED HEALTH CARE EDUCATION/TRAINING PROGRAM

## 2020-12-27 PROCEDURE — 93005 ELECTROCARDIOGRAM TRACING: CPT

## 2020-12-27 PROCEDURE — 124N000002 HC R&B MH UMMC

## 2020-12-27 PROCEDURE — 250N000013 HC RX MED GY IP 250 OP 250 PS 637: Performed by: STUDENT IN AN ORGANIZED HEALTH CARE EDUCATION/TRAINING PROGRAM

## 2020-12-27 PROCEDURE — 80053 COMPREHEN METABOLIC PANEL: CPT | Performed by: STUDENT IN AN ORGANIZED HEALTH CARE EDUCATION/TRAINING PROGRAM

## 2020-12-27 RX ORDER — KETOCONAZOLE 20 MG/G
CREAM TOPICAL 2 TIMES DAILY PRN
Status: DISCONTINUED | OUTPATIENT
Start: 2020-12-27 | End: 2021-01-01 | Stop reason: HOSPADM

## 2020-12-27 RX ORDER — DIVALPROEX SODIUM 500 MG/1
1500 TABLET, DELAYED RELEASE ORAL AT BEDTIME
Status: DISCONTINUED | OUTPATIENT
Start: 2020-12-27 | End: 2020-12-28

## 2020-12-27 RX ORDER — DIVALPROEX SODIUM 500 MG/1
1000 TABLET, DELAYED RELEASE ORAL AT BEDTIME
Status: DISCONTINUED | OUTPATIENT
Start: 2020-12-27 | End: 2020-12-27

## 2020-12-27 RX ORDER — SENNOSIDES 8.6 MG
8.6 TABLET ORAL 2 TIMES DAILY PRN
Status: DISCONTINUED | OUTPATIENT
Start: 2020-12-27 | End: 2021-01-01 | Stop reason: HOSPADM

## 2020-12-27 RX ORDER — CHLORAL HYDRATE 500 MG
1 CAPSULE ORAL DAILY
Status: DISCONTINUED | OUTPATIENT
Start: 2020-12-27 | End: 2020-12-28

## 2020-12-27 RX ORDER — SERTRALINE HYDROCHLORIDE 100 MG/1
100 TABLET, FILM COATED ORAL AT BEDTIME
Status: DISCONTINUED | OUTPATIENT
Start: 2020-12-27 | End: 2020-12-28

## 2020-12-27 RX ORDER — OXYBUTYNIN CHLORIDE 15 MG/1
15 TABLET, EXTENDED RELEASE ORAL EVERY MORNING
Status: ON HOLD | COMMUNITY
End: 2020-12-30

## 2020-12-27 RX ORDER — DOCUSATE SODIUM 100 MG/1
100 CAPSULE, LIQUID FILLED ORAL 2 TIMES DAILY
Status: DISCONTINUED | OUTPATIENT
Start: 2020-12-27 | End: 2020-12-27

## 2020-12-27 RX ORDER — MONTELUKAST SODIUM 10 MG/1
10 TABLET ORAL AT BEDTIME
Status: DISCONTINUED | OUTPATIENT
Start: 2020-12-27 | End: 2021-01-01 | Stop reason: HOSPADM

## 2020-12-27 RX ORDER — DIVALPROEX SODIUM 500 MG/1
2000 TABLET, DELAYED RELEASE ORAL AT BEDTIME
Status: DISCONTINUED | OUTPATIENT
Start: 2020-12-27 | End: 2020-12-27

## 2020-12-27 RX ORDER — DOCUSATE SODIUM 100 MG/1
100 CAPSULE, LIQUID FILLED ORAL DAILY
Status: DISCONTINUED | OUTPATIENT
Start: 2020-12-27 | End: 2021-01-01 | Stop reason: HOSPADM

## 2020-12-27 RX ADMIN — PANTOPRAZOLE SODIUM 40 MG: 40 TABLET, DELAYED RELEASE ORAL at 09:40

## 2020-12-27 RX ADMIN — DOCUSATE SODIUM 100 MG: 100 CAPSULE, LIQUID FILLED ORAL at 09:41

## 2020-12-27 RX ADMIN — Medication 1 G: at 12:54

## 2020-12-27 RX ADMIN — Medication 1000 UNITS: at 09:40

## 2020-12-27 RX ADMIN — FLUTICASONE FUROATE 1 PUFF: 100 POWDER RESPIRATORY (INHALATION) at 12:54

## 2020-12-27 ASSESSMENT — ACTIVITIES OF DAILY LIVING (ADL)
HYGIENE/GROOMING: INDEPENDENT
DRESS: INDEPENDENT
ORAL_HYGIENE: INDEPENDENT

## 2020-12-27 NOTE — SIGNIFICANT EVENT
This writer answered a phone call from the pt's group home regarding contact information for the supervisor. Was told by  staff that the supervisor is the go- to for updates etc.  Supervisor's name is Amanda and her direct number is [phone].  Writer had the pt sign an STEPHEN.

## 2020-12-27 NOTE — PROGRESS NOTES
Pt appeared to have comfortably slept a total of 7 hours. No PRNs or snacks given or requested this shift. Remained in her room the entire shift. Will continue to monitor and offer support.
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
## 2020-12-27 NOTE — PHARMACY-ADMISSION MEDICATION HISTORY
Admission Medication History status for the 12/26/2020 admission is complete.  See EPIC admission navigator for Prior to Admission medications.    Medication history sources:  Group home at [phone]( spoke with Maco) and [facility] pharmacy at  [phone]( Spoke with MARILEE Ellis)    Medication history source reliability: Good    Medication adherence:  Good     Changes made to PTA medication list (reason)  Added:   1. Oxybutynin ER 15 mg po every morning   Deleted:   1.prazosin (MINIPRESS) 2 MG capsule( discontinued as of jun 2019)  Changed:   1.ARIPiprazole (ABILIFY) 20 MG tablet daily- changed to 30 mg at bedtime.  2.divalproex sodium delayed-release (DEPAKOTE) 500 MG DR tablet daily -changed to 2000 mg qhs  3.Omega-3 Fatty Acids (FISH OIL PO) daily- changed to tid  4.fluticasone (ARNUITY ELLIPTA) 100 MCG/ACT inhaler daily   - changed to Flovent Diskus bid     Additional medication history information (including reliability of information, actions taken by pharmacist): None    Time spent in this activity: 30 minutes     Medication history completed by: Marina Ceballos, Pharm.D    Prior to Admission medications    Medication Sig Last Dose Taking? Auth Provider   fluticasone (FLOVENT DISKUS) 100 MCG/BLIST inhaler Inhale 1 puff into the lungs every 12 hours 12/26/2020 at 0800 Yes Unknown, Entered By History   oxybutynin ER (DITROPAN XL) 15 MG 24 hr tablet Take 15 mg by mouth every morning 12/26/2020 at 0800 Yes Unknown, Entered By History   albuterol (PROAIR HFA) 108 (90 Base) MCG/ACT inhaler Inhale 2 puffs into the lungs every 4 hours as needed for shortness of breath / dyspnea    Reported, Patient   ARIPiprazole (ABILIFY) 20 MG tablet Take 30 mg by mouth At Bedtime  12/25/2020 at 2000  Reported, Patient   cholecalciferol (VITAMIN D-1000 MAX ST) 1000 units TABS Take 1,000 Units by mouth 12/26/2020 at 0800  Reported, Patient   divalproex sodium delayed-release (DEPAKOTE) 500 MG DR tablet Take 2,000 mg by  mouth At Bedtime  12/25/2020 at 2000  Reported, Patient   ketoconazole (NIZORAL) 2 % external cream Apply 1 Application topically 2 times daily as needed Apply to skin fold   Reported, Patient   medroxyPROGESTERone (DEPO-PROVERA) 150 MG/ML IM injection Inject 150 mg into the muscle every 3 months Last injection was on 12/10/20 per group home   Reported, Patient   montelukast (SINGULAIR) 10 MG tablet Take 10 mg by mouth 12/25/2020 at 2000  Reported, Patient   Omega-3 Fatty Acids (FISH OIL PO) Take 1 capsule by mouth 3 times daily  12/26/2020  Reported, Patient   pantoprazole (PROTONIX) 40 MG EC tablet Take 40 mg by mouth 12/26/2020 at 0800  Reported, Patient   SENNA-docusate sodium (SENNA S) 8.6-50 MG tablet Take 1 tablet by mouth At Bedtime  12/25/2020 at 2000  Reported, Patient   sertraline (ZOLOFT) 100 MG tablet Take 200 mg by mouth At Bedtime  12/25/2020 at 2000  Reported, Patient
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
## 2020-12-27 NOTE — PLAN OF CARE
"Has been sleeping most of the day.  Easily arouses with verbal stimuli. Spent some time in the lounge area listening to music with headphones.  Reports mood a being \"okay\" ,\"fine\".  Denies suicidal thoughts and thoughts of self harm, contracts for safety.  Has been calm and cooperative.    1300  C/o indigestion and acid reflux after taking her fish oil medication.  Had small emesis the stated that she was feeling better.  Tolerated lunch.    "

## 2020-12-27 NOTE — PROGRESS NOTES
12/26/20 2223   Patient Belongings   Patient Belongings locker   Patient Belongings Put in Hospital Secure Location (Security or Locker, etc.) plastic bag;clothing   Belongings Search Yes   Clothing Search Yes   Second Staff Mayte CAMPBELL RN.     Patient belongings in locker:  Plastic bag sealed with black duct tape (unopened, per patient request)   Socks  Underwear     A               Admission:  I am responsible for any personal items that are not sent to the safe or pharmacy.  [facility] is not responsible for loss, theft or damage of any property in my possession.    Signature:  _________________________________ Date: _______  Time: _____                                              Staff Signature:  ____________________________ Date: ________  Time: _____      2nd Staff person, if patient is unable/unwilling to sign:    Signature: ________________________________ Date: ________  Time: _____     Discharge:  [facility] has returned all of my personal belongings:    Signature: _________________________________ Date: ________  Time: _____                                          Staff Signature:  ____________________________ Date: ________  Time: _____

## 2020-12-27 NOTE — PLAN OF CARE
Pt was admitted to unit from [facility]. She  to her group home staff that she was going to hurt herself. She was going to use a plastic knife or push her chair off the two story deck She has been pleasant and cooperative since arriving on the unit. She denied any SI and was able to contract for safety. She has been pleasant, calm and cooperative.

## 2020-12-28 PROCEDURE — 250N000013 HC RX MED GY IP 250 OP 250 PS 637: Performed by: STUDENT IN AN ORGANIZED HEALTH CARE EDUCATION/TRAINING PROGRAM

## 2020-12-28 PROCEDURE — 99232 SBSQ HOSP IP/OBS MODERATE 35: CPT | Mod: GC | Performed by: PSYCHIATRY & NEUROLOGY

## 2020-12-28 PROCEDURE — 124N000002 HC R&B MH UMMC

## 2020-12-28 RX ORDER — CHLORAL HYDRATE 500 MG
1 CAPSULE ORAL 3 TIMES DAILY
Status: DISCONTINUED | OUTPATIENT
Start: 2020-12-28 | End: 2021-01-01 | Stop reason: HOSPADM

## 2020-12-28 RX ORDER — DIVALPROEX SODIUM 500 MG/1
2000 TABLET, DELAYED RELEASE ORAL AT BEDTIME
Status: DISCONTINUED | OUTPATIENT
Start: 2020-12-28 | End: 2021-01-01 | Stop reason: HOSPADM

## 2020-12-28 RX ORDER — OXYBUTYNIN CHLORIDE 5 MG/1
15 TABLET, EXTENDED RELEASE ORAL DAILY
Status: DISCONTINUED | OUTPATIENT
Start: 2020-12-29 | End: 2021-01-01 | Stop reason: HOSPADM

## 2020-12-28 RX ORDER — ARIPIPRAZOLE 10 MG/1
20 TABLET ORAL AT BEDTIME
Status: DISCONTINUED | OUTPATIENT
Start: 2020-12-28 | End: 2020-12-29

## 2020-12-28 RX ADMIN — PANTOPRAZOLE SODIUM 40 MG: 40 TABLET, DELAYED RELEASE ORAL at 09:09

## 2020-12-28 RX ADMIN — Medication 1000 UNITS: at 09:09

## 2020-12-28 RX ADMIN — DIVALPROEX SODIUM 2000 MG: 500 TABLET, DELAYED RELEASE ORAL at 22:20

## 2020-12-28 RX ADMIN — DOCUSATE SODIUM 100 MG: 100 CAPSULE, LIQUID FILLED ORAL at 09:09

## 2020-12-28 RX ADMIN — Medication 1 G: at 14:19

## 2020-12-28 RX ADMIN — FLUTICASONE FUROATE 1 PUFF: 100 POWDER RESPIRATORY (INHALATION) at 09:10

## 2020-12-28 RX ADMIN — Medication 1 G: at 22:20

## 2020-12-28 RX ADMIN — OLANZAPINE 10 MG: 10 TABLET, FILM COATED ORAL at 09:10

## 2020-12-28 RX ADMIN — ACETAMINOPHEN 650 MG: 325 TABLET, FILM COATED ORAL at 09:09

## 2020-12-28 RX ADMIN — MONTELUKAST 10 MG: 10 TABLET, FILM COATED ORAL at 22:21

## 2020-12-28 RX ADMIN — ARIPIPRAZOLE 20 MG: 10 TABLET ORAL at 22:20

## 2020-12-28 RX ADMIN — SERTRALINE HYDROCHLORIDE 150 MG: 100 TABLET ORAL at 22:21

## 2020-12-28 RX ADMIN — Medication 1 G: at 09:09

## 2020-12-28 ASSESSMENT — ACTIVITIES OF DAILY LIVING (ADL)
ORAL_HYGIENE: INDEPENDENT
HYGIENE/GROOMING: INDEPENDENT
ORAL_HYGIENE: INDEPENDENT
HYGIENE/GROOMING: INDEPENDENT
DRESS: INDEPENDENT
DRESS: INDEPENDENT;SCRUBS (BEHAVIORAL HEALTH)

## 2020-12-28 NOTE — PROGRESS NOTES
"  ----------------------------------------------------------------------------------------------------------  [facility], [place]   Psychiatric Progress Note  Hospital Day #2     Interim History:   The patient's care was discussed with the treatment team and chart notes were reviewed.    Sleep 7 hours (20 0600)  Scheduled Medications: took all scheduled medications as prescribed   PRN medications: no psychiatric PRNs given     Staff Report: Michelle has been cooperative.    Patient Interview:   Michelle Person was interviewed remotely via teleconference. She endorsed suicidal ideation with a plan and intent to die as a cause for her being in the hospital. Her mood was \"sad and depressed\". She reported that she is her own guardian. She has been living at her group home for 3 years and she liked it there so far. She reported that she gets along with her house mates. She reported that she has been thinking more about the loss of her grandmother and this precipitated his worsening depression and suicidal ideation. She tried to cut herself with a plastic knife because she wanted to die. She was upset that she could not have her jessi bear, the one she sleeps with. We reassured that she will get it back as soon as she is discharged. The jessi neumann name is azam. Physically she was feeling achy, tired and confused.  Denied any changes in sleep and appetite. She has  poor sleep at baseline. Patient today said her grandmother  a year ago, per H&P it was 15 years ago.    The risks, benefits, alternatives and side effects of any medication changes have been discussed and are understood by the patient and other caregivers.    Review of systems:     ROS was negative unless noted above.          Allergies:     Allergies   Allergen Reactions     Ibuprofen Other (See Comments)     Until cleared by primary MD d/t kidney function     Tomato Unknown            Psychiatric Examination:   /73  " " Pulse 76   Temp 97.5  F (36.4  C) (Oral)   Resp 16   Wt 77.9 kg (171 lb 11.2 oz)   SpO2 97%   BMI 32.44 kg/m    Weight is 171 lbs 11.2 oz  Body mass index is 32.44 kg/m .    MENTAL STATUS EXAM    Appearance:  no apparent distress, obese, appears stated age and in hospital scrubs  Attitude:  cooperative and apathetic  Psychomotor:  normal and no evidence of tics, dystonia, or tardive dyskinesia  Eye Contact: downcast  Speech:  decreased rate and increased latency, otherwise clear  Mood: \"sad and depressed confused and tired\"  Affect:  congruent and restricted  Thought Content: active suicidal ideation  Thought Process: confused, incoherent and somewhat loss of abstract thinking  Sensorium: awake  Cognition: average remote memory, average recent past memory, average concentration, average abstraction, impaired fund of information, average language ability, average attention span and average intelligence  Impulse control: poor  Insight: fair  Judgment: questionable         Labs:     Results for orders placed or performed during the hospital encounter of 12/26/20 (from the past 24 hour(s))   EKG 12-lead, tracing only   Result Value Ref Range    Interpretation ECG Click View Image link to view waveform and result         Assessment    Principal Diagnosis:   # Major Depressive Disorder, severe, with psychotic features     Secondary psychiatric diagnoses of concern this admission:   - PTSD by history   - Mild intellectual disability per chart      Diagnostic Impression:   31 y/o AAF with mild intellectual disability and major depression with psychotic features admitted from group home after attempting to cut herself with plastic silverware or jump off a second floor balcony.  Patient identifies anniversary of grandmother's death 15 years ago as the trigger for mood worsening in the past 2 weeks and nonadherence with medication for several days.  Medications restarted at half previous dose except for Depakote  1500 " mg at bedtime, as patient has diagnosed seizure disorder.  Gold team to titrate medications back to previous doses if tolerated and deemed appropriate, need to contact group home and patient's mother to identify extent of recent behavioral change or other stressors to determine if this is a relapse of her mood disorder or impulsive act of emotional dysregulation.  Patient has history of previous suicide gestures leading to brief hospitalizations with return to previous residence.    Psychiatric Hospital course:   Michelle Person was admitted to [facility] as a voluntary patient. Medications restarted at half previous dose except for Depakote DR 1500 mg at bedtime, as patient has diagnosed seizure disorder.  Patient was cooperative in the milieu. On 12/28 This author attempted to contact Dr. Collins, left a message. PTA aripiprazole increased to 20 mg, zoloft increased to 150, depakote increased to 2000 mg today.          Discontinued Medications (& Rationale):  none    Medical course   Admission labs as below:    Data:   Laboratory/Imaging:  -COVID-19: Negative  -Beta hCG: Negative  -Blood alcohol: None detected  -Valproic acid level: 59.0 mcg/mL  -CBC: Within normal limits  -CMP: Notable for albumin low at 3.0 and protein low at 4.6.  All other values within normal limits.   - ECG: QTc: 402 ms, sinus rhythm with sinus arrhythmia, nonspecific ST and T wave abnormality.    Consults:   none    Plan     Today's Changes:  - Zoloft 150 mg   - Depakote 2000 mg at bedtime -seizure disorder, increased to PTA dose  - Abilify 20 mg    Psychotropic Medications:  Scheduled Psych Medications:  - zoloft  - depakote  - abilify    PRN Psych Medications  - Hydroxyzine 25 mg PRN Q4H  - Olanzapine 10 mg PO/IM prn Q2H severe agitation/psychosis  - Trazodone 50 mg PRN QHS    Patient will be treated in therapeutic milieu with appropriate individual and group therapies as described.    Medical diagnoses to be addressed this admission:           #.  History of seizure disorder  - Continue valproate, consider increasing to PTA dose if appropriate     #. Asthma, unspecified  - Continue PTA fluticasone, montelukast,albuterol PRN     # Urinary incontinence, unspecified  - Continue PTA oxybutynin     Legal Status:   Orders Placed This Encounter      Voluntary      Safety Assessment:   Behavioral Orders   Procedures     Code 1 - Restrict to Unit     Fall precautions     Routine Programming     As clinically indicated     Self Injury Precaution     Status 15     Every 15 minutes.     Suicide precautions     Patients on Suicide Precautions should have a Combination Diet ordered that includes a Diet selection(s) AND a Behavioral Tray selection for Safe Tray - with utensils, or Safe Tray - NO utensils         Disposition: 2-3 days pending stabilization and resolving suicidal ideation, back to group home    The patient was seen and the plan was discussed with the attending physician.     This patient was seen and discussed with my attending physician.  OSCAR Pizarro MD, MSc  PGY-1 Psychiatry Resident Physician    Psychiatry Attending Attestation: I, Steve Fagan, saw and evaluated the patient with the resident physician.  I agree with the findings and plan of care as documented in the resident note.  I have reviewed all labs and vital signs.

## 2020-12-28 NOTE — PROGRESS NOTES
The patient was mostly in her room, isolated. The patient participated one of the group. The patient stated been tired, denies SI/SIB as well as visual/hallucination. Per observation, the pt had incident at breakfast taken plastic safety knife in her tray, wanted to cut herself. The staff intervention immediate talk thorough and provided coping skill drawing  to come her down, then took medication. Overall, the patient mood is calm, pleasant approach, blunted/flat effect. The patient ate all the meals, slept half of the shift. Patient is direct-able, follows the staff directions. Will continue to monitor the patient.

## 2020-12-28 NOTE — PLAN OF CARE
BEHAVIORAL TEAM DISCUSSION    Participants:   Dr. Fagan, Dr. Pizarro, Pricila Henderson RN, Paula Stovall MA.LP    Anticipated length of stay:   5-7 days    Continued Stay Criteria/Rationale:   Worsening depression with SI    Medical/Physical:   Seizure Disorder  Asthma  Urinary Incontinence    Precautions:   Behavioral Orders   Procedures     Code 1 - Restrict to Unit     Fall precautions     Routine Programming     As clinically indicated     Self Injury Precaution     Status 15     Every 15 minutes.     Suicide precautions     Patients on Suicide Precautions should have a Combination Diet ordered that includes a Diet selection(s) AND a Behavioral Tray selection for Safe Tray - with utensils, or Safe Tray - NO utensils       Plan:   Patient will have ongoing psychiatric assessment.  Medications will be reviewed and adjusted per MD as indicated.  Outpatient providers will be contacted for care coordination.  Hospital staff will provide a safe environment and a therapeutic milieu. Patient will be encouraged to participate in unit groups and activities.   CTC will continue to assess needs and  ensure appropriate follow up care is in place.       Rationale for change in precautions or plan:   No change in plan/precautions

## 2020-12-28 NOTE — PLAN OF CARE
Problem: General Plan of Care (Inpatient Behavioral)  Goal: Individualization/Patient Specific Goal (IP Behavioral)  Description: The patient and/or their representative will achieve their patient-specific goals related to the plan of care.    The patient-specific goals include:  Flowsheets (Taken 12/28/2020 0908)  Patient Strengths:   Stable housing   Financial stability   Utilized support systems   Adherent to medication regime   Stable and supportive family   Engagement in hobbies, sports, arts, clubs   Involved in community  Note: Patient goals:  Feel better  Go home    Plan for admission:  1. Stabilization of mood disorder symptoms  2. Absence of SI- safe with self  3. Medication management per MD's  4. Coordination of care with outpatient providers, family  5. Psychiatric follow up care in place

## 2020-12-28 NOTE — PLAN OF CARE
Pt in bed sleeping at start of shift. Breathing quiet and unlabored. Appeared to be sleeping for 7 hours during the night.     On Suicide, Self-injury, and Fall precautions, with no related events occurring this shift.     Will continue to monitor and assess.       Problem: Behavioral Health Plan of Care  Goal: Absence of New-Onset Illness or Injury  Outcome: Improving

## 2020-12-28 NOTE — PROGRESS NOTES
12/28/20 1424   General Information   Date Initially Attended OT 12/28/20     Attended 1 occupational therapy group this date. Overall content and cooperative.     Pt's first attendance in Occupational Therapy Clinic. Pt Response: I to initiate her own I project: writing poetry in a journal. Demonstrated fair attention throughout duration of group. When approached, Pt was brief and superficial in conversation r/t journaling; stated that she enjoys writing poetry in her free time at home. Appeared particularly aware of her surroundings as she often looked up, around, and through the window. Was observed grinding teeth - similar in presentation to Downs' Syndrome - throughout duration of group.       OT staff will meet with pt to review the role of occupational therapy and explain the value of having them involved in their treatment plan including options to meet current needs/self-identified goals. As group attendance is established,  continued clinical observations will be made and Pt will be given self-assessment to inform OT initial assessment.

## 2020-12-28 NOTE — PROGRESS NOTES
"Pt was withdrawn and isolative from peers during the shift, watched a movie in the OT room and ate dinner.  After dinner, pt has been in her room.  Appeared calm, cooperative, full range affect, no stated SI, SIB or hallucinations.  Pt asked about leaving, staff told pt that, \"she will have the opportunity to speak with the doctor in the morning.\"    "

## 2020-12-28 NOTE — PLAN OF CARE
"Pt ate meals in the lounge and went to a group.  Staff helped pt fill out her menu.  Pt  has no other SIB attempts since this morning at breakfast.  Pt otherwise has been in bed.  Pt stated she's been \"fine, no further SIB.  Pt quiet isolative to self.    "

## 2020-12-29 PROCEDURE — 250N000013 HC RX MED GY IP 250 OP 250 PS 637: Performed by: STUDENT IN AN ORGANIZED HEALTH CARE EDUCATION/TRAINING PROGRAM

## 2020-12-29 PROCEDURE — 250N000013 HC RX MED GY IP 250 OP 250 PS 637: Performed by: PSYCHIATRY & NEUROLOGY

## 2020-12-29 PROCEDURE — 124N000002 HC R&B MH UMMC

## 2020-12-29 PROCEDURE — 99232 SBSQ HOSP IP/OBS MODERATE 35: CPT | Mod: GC | Performed by: PSYCHIATRY & NEUROLOGY

## 2020-12-29 RX ADMIN — Medication 1 G: at 08:11

## 2020-12-29 RX ADMIN — ARIPIPRAZOLE 25 MG: 5 TABLET ORAL at 20:15

## 2020-12-29 RX ADMIN — Medication 1000 UNITS: at 08:11

## 2020-12-29 RX ADMIN — DOCUSATE SODIUM 100 MG: 100 CAPSULE, LIQUID FILLED ORAL at 08:11

## 2020-12-29 RX ADMIN — ACETAMINOPHEN 650 MG: 325 TABLET, FILM COATED ORAL at 14:25

## 2020-12-29 RX ADMIN — OLANZAPINE 10 MG: 10 TABLET, FILM COATED ORAL at 08:58

## 2020-12-29 RX ADMIN — SERTRALINE HYDROCHLORIDE 150 MG: 100 TABLET ORAL at 20:15

## 2020-12-29 RX ADMIN — OXYBUTYNIN CHLORIDE 15 MG: 5 TABLET, EXTENDED RELEASE ORAL at 08:11

## 2020-12-29 RX ADMIN — MONTELUKAST 10 MG: 10 TABLET, FILM COATED ORAL at 20:15

## 2020-12-29 RX ADMIN — Medication 1 G: at 14:25

## 2020-12-29 RX ADMIN — FLUTICASONE FUROATE 1 PUFF: 100 POWDER RESPIRATORY (INHALATION) at 08:11

## 2020-12-29 RX ADMIN — DIVALPROEX SODIUM 2000 MG: 500 TABLET, DELAYED RELEASE ORAL at 20:15

## 2020-12-29 RX ADMIN — POLYETHYLENE GLYCOL 3350 17 G: 17 POWDER, FOR SOLUTION ORAL at 08:13

## 2020-12-29 RX ADMIN — Medication 1 G: at 20:15

## 2020-12-29 RX ADMIN — PANTOPRAZOLE SODIUM 40 MG: 40 TABLET, DELAYED RELEASE ORAL at 08:11

## 2020-12-29 ASSESSMENT — ACTIVITIES OF DAILY LIVING (ADL)
DRESS: INDEPENDENT
HYGIENE/GROOMING: INDEPENDENT
DRESS: SCRUBS (BEHAVIORAL HEALTH)
ORAL_HYGIENE: INDEPENDENT
ORAL_HYGIENE: INDEPENDENT
LAUNDRY: WITH SUPERVISION
HYGIENE/GROOMING: INDEPENDENT;SHOWER

## 2020-12-29 NOTE — PROGRESS NOTES
1. What PRN did patient receive? Zyprexa    2. What was the patient doing that led to the PRN medication? Agitation    3. Did they require R/S? NO    4. Side effects to PRN medication? None    5. After 1 Hour, patient appeared: Calm

## 2020-12-29 NOTE — PLAN OF CARE
Pt denied SI/SIB denies hallucinations.  Pt completed ADL's=showered, groomed hair, attending groups.  Affect appears brighter.  Pleasant and cooperative.

## 2020-12-29 NOTE — PROGRESS NOTES
"  ----------------------------------------------------------------------------------------------------------  [facility], [place]   Psychiatric Progress Note  Hospital Day #3     Interim History:   The patient's care was discussed with the treatment team and chart notes were reviewed.    Sleep 7 hours (12/28/20 0600)  Scheduled Medications: took all scheduled medications as prescribed   PRN medications: zyprexa    Staff Report: Pt's mood was calm but had flat affect. She was isolative in her room in bed. She was out for dinner. Pt stated stated she had no suicidal ideation or self-harm thoughts. She did not have auditory or visual halluciantion. Staff will continue to monitor pt per care plan. There was one incident yesterday morning with a breakfast knife. Patient later calmed, staff printed her pictures of Jessi Rand.    Patient Interview:   Michelle Person was interviewed remotely via teleconference. Michelle told us that she has been constipated and nurses gave her something for that. She read us the poem she wrote for her friend \"Grayson\" the jessi bear. The poem was about how much she misses Grayson and how much she wanted to be with Grayson. She endorsed suicidal ideation, denied homicidal ideation. Other than stomach problems she reported no physical concerns at this time. She said that she is getting along with other mates, she did not express any concerns about staff. She was tearful at times while reading the poem. She tried to jump out of her window in her room today. The suicidal impulse was still there. She endorsed that she hear voices, unable to make out what they are saying. Voices do not keep her awake at night. She agreed to the increase in dose if abilify. We will monitor for side effects.    The risks, benefits, alternatives and side effects of any medication changes have been discussed and are understood by the patient and other caregivers.    Review of systems:     ROS " "was negative unless noted above.          Allergies:     Allergies   Allergen Reactions     Ibuprofen Other (See Comments)     Until cleared by primary MD d/t kidney function     Tomato Unknown            Psychiatric Examination:   /73   Pulse 76   Temp 97.5  F (36.4  C) (Tympanic)   Resp 16   Wt 77.9 kg (171 lb 11.2 oz)   SpO2 94%   BMI 32.44 kg/m    Weight is 171 lbs 11.2 oz  Body mass index is 32.44 kg/m .    MENTAL STATUS EXAM         Psychiatric Examination:   Appearance:  no apparent distress, obese and appears stated age  Attitude:  cooperative, engaged, apathetic and withdrawn  Psychomotor:  no evidence of tics, dystonia, or tardive dyskinesia and slowed  Eye Contact: appropriate, downcast and staring  Speech:  monotone, decreased rate, flattened prosody and poverty of speech, increased latency at times  Mood: \"sad and tired\"  Affect:  congruent, flat and sad and emotional  Thought Content: active suicidal ideation and denies homicidal ideation  Thought Process: answered question appropriately, somewhat diminished abstrac thinking  Sensorium: awake, alert, endorses auditory hallucinations and endorses visual hallucinations  Cognition: capacity to remember intact and appropriate language skills  Impulse control: impaired  Insight: questionable  Judgment: questionable           Labs:     No results found for this or any previous visit (from the past 24 hour(s)).     Assessment    Principal Diagnosis:   # Major Depressive Disorder, severe, with psychotic features     Secondary psychiatric diagnoses of concern this admission:   - PTSD by history   - Mild intellectual disability per chart      Diagnostic Impression:   29 y/o AAF with mild intellectual disability and major depression with psychotic features admitted from group home after attempting to cut herself with plastic silverware or jump off a second floor balcony.  Patient identifies anniversary of grandmother's death 15 years ago as the " trigger for mood worsening in the past 2 weeks and nonadherence with medication for several days.  Medications restarted at half previous dose except for Depakote DR 1500 mg at bedtime, as patient has diagnosed seizure disorder.  Gold team to titrate medications back to previous doses if tolerated and deemed appropriate, need to contact group home and patient's mother to identify extent of recent behavioral change or other stressors to determine if this is a relapse of her mood disorder or impulsive act of emotional dysregulation.  Patient has history of previous suicide gestures leading to brief hospitalizations with return to previous residence.    Psychiatric Hospital course:   Michelle Person was [facility] to [facility] as a voluntary patient. Medications restarted at half previous dose except for Depakote DR 1500 mg at bedtime, as patient has diagnosed seizure disorder.  Patient was cooperative in the milieu. On 12/28 This author attempted to contact Dr. Collins, left a message. PTA aripiprazole increased to 20 mg, zoloft increased to 150, depakote increased to 2000 mg today.   On 12/29/2020 Michelle was very emotional and missing her jessi bear. She was continuing to have suicidal impulses and experiencing hallucinations. Aripiprazole dose was increased to 25 mg. Concerns regarding constipation. Prn's were given.         Discontinued Medications (& Rationale):  none    Medical course   Admission labs as below:    Data:   Laboratory/Imaging:  -COVID-19: Negative  -Beta hCG: Negative  -Blood alcohol: None detected  -Valproic acid level: 59.0 mcg/mL  -CBC: Within normal limits  -CMP: Notable for albumin low at 3.0 and protein low at 4.6.  All other values within normal limits.   - ECG: QTc: 402 ms, sinus rhythm with sinus arrhythmia, nonspecific ST and T wave abnormality.    Consults:   none    Plan     Today's Changes:  - Zoloft 150 mg -- PTA dose is 200 mg  - Depakote 2000 mg at bedtime -seizure disorder, increased  to PTA dose  - Abilify 25 mg -- PTA dose is 30 mg    Psychotropic Medications:  Scheduled Psych Medications:  - zoloft  - depakote  - abilify    PRN Psych Medications  - Hydroxyzine 25 mg PRN Q4H  - Olanzapine 10 mg PO/IM prn Q2H severe agitation/psychosis  - Trazodone 50 mg PRN QHS    Patient will be treated in therapeutic milieu with appropriate individual and group therapies as described.    Medical diagnoses to be addressed this admission:          #.  History of seizure disorder  - Continue valproate, consider increasing to PTA dose if appropriate     #. Asthma, unspecified  - Continue PTA fluticasone, montelukast,albuterol PRN     # Urinary incontinence, unspecified  - Continue PTA oxybutynin     Legal Status:   Orders Placed This Encounter      Voluntary      Safety Assessment:   Behavioral Orders   Procedures     Code 1 - Restrict to Unit     Fall precautions     Routine Programming     As clinically indicated     Self Injury Precaution     Status 15     Every 15 minutes.     Suicide precautions     Patients on Suicide Precautions should have a Combination Diet ordered that includes a Diet selection(s) AND a Behavioral Tray selection for Safe Tray - with utensils, or Safe Tray - NO utensils         Disposition: to group home,  2-3 days pending stabilization and resolving suicidal ideation, back to group home    The patient was seen and the plan was discussed with the attending physician.     This patient was seen and discussed with my attending physician.  OSCAR Pizarro MD, MSc  PGY-1 Psychiatry Resident Physician    Psychiatry Attending Attestation: I, Steve Fagan, saw and evaluated the patient with the resident physician.  I agree with the findings and plan of care as documented in the resident note.  I have reviewed all labs and vital signs.

## 2020-12-29 NOTE — PROGRESS NOTES
Pt's mood was calm but had flat affect. She was isolative in her room in bed. She was out for dinner. Pt stated stated she had no suicidal ideation or self-harm thoughts. She did not have auditory or visual halluciantion. Staff will continue to monitor pt per care plan.

## 2020-12-29 NOTE — PROGRESS NOTES
Pt appeared asleep for 6.25 hours.  Pt got up at 4am wondering what time it was then went back to bed.

## 2020-12-29 NOTE — PROGRESS NOTES
"Pt started off having a hard time in the morning. Pt threatened to jump off her window in her room. Writer and other staff helped talk pt out of jumping and encouraged her to hangout in the lounge with peers and where staff can see her. Pt also had a hard time during breakfast and ate very little. Pt was glaring into space with a tightly closed fist ad mumbling to herself. Writer approached pt an inquired what she was struggling with, pt replied \"it is not the same, it is not the same. I don't feel good. I am feeling bad\" then started walking slowly with her  fist still balled up, lips intensely clenched and mumbling the same words \"it is not the same, it is not the same\" again. Pt appears to be experiencing severe and rapid mood changes and requires a lot hand holding during those mood changes.   "

## 2020-12-29 NOTE — PLAN OF CARE
Daily CTC Note:    Work Completed:   The patient's care was discussed with the treatment team and chart notes were reviewed.   Patient met with team. States she is still having suicidal ideation, and AH. Wants to go home but agreeable to stay until she is feeling better.  Writer spoke to director of .  They are happy to have patient return when deemed stable.  Call placed to patient's outpatient Psychiatrist- awaiting call back.      Discharge plan or goal:   Return to   Resume care with outpatient providers    Barriers to discharge:   Ongoing symptoms, suicidal ideation, AH, medication optimization

## 2020-12-29 NOTE — PLAN OF CARE
Problem: OT General Care Plan  Goal: OT Goal 1  Description: Pt will demonstrate consistent engagement in OT group activities that support recovery as evidenced by participating in >1 scheduled OT group/day for 5 days.     Attended 2 occupational therapy groups this date, however stayed for ~20mins/session - No Charge. Content and cooperative. Reported feeling ready to discharge. Spent majority of her time in group writing poetry.     Outcome: No Change

## 2020-12-29 NOTE — PLAN OF CARE
"Thus far pt has had a good shift. She initially was quite focused on discharge yet reluctantly accepted that this would not happen tonight. She admitted to  this morning to jump out of her window but said that she was able to work with staff. She seemed to acknowledge that she needs to be sincere with staff about her SI/B and AH, becoming more stable prior to discharge. With some basic, slow teaching she acknowledge the risk of premature discharge. She was then quite happy to recite some poetry, sing a song with encouragement and then play guitar with a peer in the milieu. Overall she seems more relaxed. Pt with limited I/J due to \"mild intellectual disability,\" needs reinforcement with basic teaching, but responding well to redirection.    Addendum 2200: Pt continued to have a good evening, enjoying music OT group. She denied AH and SI, SIB, and agreed to talk to group home staff if these feelings arose again. She was focused on discharge tomorrow, asking what time she could leave. It was reiterated to her that this is yet to be determined- if and when. Pt accepted this well.   "

## 2020-12-30 PROBLEM — J45.909 ASTHMA: Status: ACTIVE | Noted: 2020-12-30

## 2020-12-30 PROBLEM — E55.9 VITAMIN D DEFICIENCY: Status: ACTIVE | Noted: 2020-12-30

## 2020-12-30 PROBLEM — G40.909 SEIZURE DISORDER (H): Status: ACTIVE | Noted: 2020-12-30

## 2020-12-30 PROBLEM — K59.00 CONSTIPATION: Status: ACTIVE | Noted: 2020-12-30

## 2020-12-30 PROBLEM — Z30.9 CONTRACEPTIVE MANAGEMENT: Status: ACTIVE | Noted: 2020-12-30

## 2020-12-30 PROBLEM — K27.9 PEPTIC ULCER: Status: ACTIVE | Noted: 2020-12-30

## 2020-12-30 PROCEDURE — 250N000013 HC RX MED GY IP 250 OP 250 PS 637: Performed by: STUDENT IN AN ORGANIZED HEALTH CARE EDUCATION/TRAINING PROGRAM

## 2020-12-30 PROCEDURE — 99232 SBSQ HOSP IP/OBS MODERATE 35: CPT | Mod: GC | Performed by: PSYCHIATRY & NEUROLOGY

## 2020-12-30 PROCEDURE — G0177 OPPS/PHP; TRAIN & EDUC SERV: HCPCS

## 2020-12-30 PROCEDURE — 250N000013 HC RX MED GY IP 250 OP 250 PS 637: Performed by: PSYCHIATRY & NEUROLOGY

## 2020-12-30 PROCEDURE — 124N000002 HC R&B MH UMMC

## 2020-12-30 RX ORDER — SENNA AND DOCUSATE SODIUM 50; 8.6 MG/1; MG/1
1 TABLET, FILM COATED ORAL AT BEDTIME
Qty: 30 TABLET | Refills: 0 | Status: SHIPPED | OUTPATIENT
Start: 2020-12-30

## 2020-12-30 RX ORDER — SERTRALINE HYDROCHLORIDE 100 MG/1
200 TABLET, FILM COATED ORAL AT BEDTIME
Status: DISCONTINUED | OUTPATIENT
Start: 2020-12-30 | End: 2021-01-01 | Stop reason: HOSPADM

## 2020-12-30 RX ORDER — SENNOSIDES 8.6 MG
1 TABLET ORAL 2 TIMES DAILY PRN
Qty: 30 TABLET | Refills: 0 | Status: SHIPPED | OUTPATIENT
Start: 2020-12-30

## 2020-12-30 RX ORDER — CHLORAL HYDRATE 500 MG
1 CAPSULE ORAL 3 TIMES DAILY
Qty: 30 CAPSULE | Refills: 0 | Status: SHIPPED | OUTPATIENT
Start: 2020-12-30

## 2020-12-30 RX ORDER — ARIPIPRAZOLE 30 MG/1
30 TABLET ORAL DAILY
Qty: 30 TABLET | Refills: 0 | Status: SHIPPED | OUTPATIENT
Start: 2020-12-31

## 2020-12-30 RX ORDER — MONTELUKAST SODIUM 10 MG/1
10 TABLET ORAL AT BEDTIME
Qty: 30 TABLET | Refills: 0 | Status: SHIPPED | OUTPATIENT
Start: 2020-12-30

## 2020-12-30 RX ORDER — ALBUTEROL SULFATE 90 UG/1
2 AEROSOL, METERED RESPIRATORY (INHALATION) DAILY PRN
Qty: 1 INHALER | Refills: 0 | Status: SHIPPED | OUTPATIENT
Start: 2020-12-30

## 2020-12-30 RX ORDER — DIVALPROEX SODIUM 500 MG/1
2000 TABLET, DELAYED RELEASE ORAL AT BEDTIME
Qty: 120 TABLET | Refills: 0 | Status: SHIPPED | OUTPATIENT
Start: 2020-12-30

## 2020-12-30 RX ORDER — ARIPIPRAZOLE 10 MG/1
30 TABLET ORAL DAILY
Status: DISCONTINUED | OUTPATIENT
Start: 2020-12-30 | End: 2021-01-01 | Stop reason: HOSPADM

## 2020-12-30 RX ORDER — DOCUSATE SODIUM 100 MG/1
100 CAPSULE, LIQUID FILLED ORAL DAILY
Qty: 30 CAPSULE | Refills: 0 | Status: SHIPPED | OUTPATIENT
Start: 2020-12-31

## 2020-12-30 RX ORDER — POLYETHYLENE GLYCOL 3350 17 G/17G
17 POWDER, FOR SOLUTION ORAL DAILY PRN
Qty: 30 PACKET | Refills: 0 | Status: SHIPPED | OUTPATIENT
Start: 2020-12-30

## 2020-12-30 RX ORDER — VITAMIN B COMPLEX
25 TABLET ORAL DAILY
Qty: 30 TABLET | Refills: 0 | Status: SHIPPED | OUTPATIENT
Start: 2020-12-31

## 2020-12-30 RX ORDER — MEDROXYPROGESTERONE ACETATE 150 MG/ML
150 INJECTION, SUSPENSION INTRAMUSCULAR
Qty: 1 ML | Refills: 0 | OUTPATIENT
Start: 2020-12-30

## 2020-12-30 RX ORDER — ARIPIPRAZOLE 10 MG/1
30 TABLET ORAL AT BEDTIME
Status: DISCONTINUED | OUTPATIENT
Start: 2020-12-30 | End: 2020-12-30

## 2020-12-30 RX ORDER — OLANZAPINE 10 MG/1
10 TABLET ORAL 3 TIMES DAILY PRN
Qty: 30 TABLET | Refills: 0 | Status: SHIPPED | OUTPATIENT
Start: 2020-12-30 | End: 2022-01-01

## 2020-12-30 RX ORDER — OXYBUTYNIN CHLORIDE 15 MG/1
15 TABLET, EXTENDED RELEASE ORAL DAILY
Qty: 30 TABLET | Refills: 0 | Status: SHIPPED | OUTPATIENT
Start: 2020-12-31

## 2020-12-30 RX ORDER — SERTRALINE HYDROCHLORIDE 100 MG/1
200 TABLET, FILM COATED ORAL AT BEDTIME
Qty: 60 TABLET | Refills: 0 | Status: SHIPPED | OUTPATIENT
Start: 2020-12-30

## 2020-12-30 RX ORDER — PANTOPRAZOLE SODIUM 40 MG/1
40 TABLET, DELAYED RELEASE ORAL DAILY
Qty: 30 TABLET | Refills: 0 | Status: ON HOLD | OUTPATIENT
Start: 2020-12-31 | End: 2022-01-01

## 2020-12-30 RX ADMIN — OLANZAPINE 10 MG: 10 TABLET, FILM COATED ORAL at 08:19

## 2020-12-30 RX ADMIN — POLYETHYLENE GLYCOL 3350 17 G: 17 POWDER, FOR SOLUTION ORAL at 18:04

## 2020-12-30 RX ADMIN — ARIPIPRAZOLE 30 MG: 10 TABLET ORAL at 10:20

## 2020-12-30 RX ADMIN — Medication 1 G: at 16:49

## 2020-12-30 RX ADMIN — ACETAMINOPHEN 650 MG: 325 TABLET, FILM COATED ORAL at 03:59

## 2020-12-30 RX ADMIN — Medication 1000 UNITS: at 08:19

## 2020-12-30 RX ADMIN — PANTOPRAZOLE SODIUM 40 MG: 40 TABLET, DELAYED RELEASE ORAL at 08:19

## 2020-12-30 RX ADMIN — MONTELUKAST 10 MG: 10 TABLET, FILM COATED ORAL at 20:53

## 2020-12-30 RX ADMIN — FLUTICASONE FUROATE 1 PUFF: 100 POWDER RESPIRATORY (INHALATION) at 08:19

## 2020-12-30 RX ADMIN — SERTRALINE HYDROCHLORIDE 200 MG: 100 TABLET ORAL at 20:53

## 2020-12-30 RX ADMIN — Medication 1 G: at 14:19

## 2020-12-30 RX ADMIN — DIVALPROEX SODIUM 2000 MG: 500 TABLET, DELAYED RELEASE ORAL at 20:53

## 2020-12-30 RX ADMIN — Medication 1 G: at 08:19

## 2020-12-30 RX ADMIN — OXYBUTYNIN CHLORIDE 15 MG: 5 TABLET, EXTENDED RELEASE ORAL at 08:19

## 2020-12-30 RX ADMIN — Medication 1 G: at 20:56

## 2020-12-30 RX ADMIN — ACETAMINOPHEN 650 MG: 325 TABLET, FILM COATED ORAL at 08:54

## 2020-12-30 RX ADMIN — DOCUSATE SODIUM 100 MG: 100 CAPSULE, LIQUID FILLED ORAL at 08:19

## 2020-12-30 ASSESSMENT — ACTIVITIES OF DAILY LIVING (ADL)
ORAL_HYGIENE: INDEPENDENT
DRESS: INDEPENDENT
HYGIENE/GROOMING: INDEPENDENT;SHOWER

## 2020-12-30 NOTE — PLAN OF CARE
Problem: OT General Care Plan  Goal: OT Goal 1  Description: Pt will demonstrate consistent engagement in OT group activities that support recovery as evidenced by participating in >1 scheduled OT group/day for 5 days.     Attended 1 occupational therapy group this date.  Leisure exploration and participation group offered for increased intrinsic motivation to engage in social, non-obligatory occupations via a group game. Structured group was used to promote positive milieu interaction and collaboration. Pt Response: Congruent affect. Somewhat restless to excuse self, yet over displayed full participation.    Outcome: Improving

## 2020-12-30 NOTE — PROGRESS NOTES
Pt was up at about 0315 AM. Complained of headache and was given Tylenol. Pt was unable to sleep and said that she was anxious about her discharge today. Offered her snack so she can go back to bed, which she accepted. Fell asleep soon after.Total sleep hours : 4.5 hours.  Will continue to monitor.

## 2020-12-30 NOTE — PLAN OF CARE
Daily CTC Note:     Work Completed:   The patient's care was discussed with the treatment team and chart notes were reviewed.   Patient met with team. States she has not had any SI- admits to . Wants to go home but again is willing to stay.  Plan to tentatively discharge Friday.  Will contact .  Writer left message with patient's CM with update.     Discharge plan or goal:   Return to   Resume care with outpatient providers     Barriers to discharge:   Ongoing symptoms, , medication optimization      ADDENDUM:  I spoke with  manager Amanda and gave her a heads up that patient will likely be discharged on Friday.  She requested patient's MARS and this was faxed.    MD notified.  Patient will need to be sent via cab.  Will make arrangements for  at 1:00pm.

## 2020-12-30 NOTE — PROGRESS NOTES
"  ----------------------------------------------------------------------------------------------------------  [facility], [place]   Psychiatric Progress Note  Hospital Day #4     Interim History:   The patient's care was discussed with the treatment team and chart notes were reviewed.    Sleep 4.5 hours (12/30/20 0600)  Scheduled Medications: took all scheduled medications as prescribed   PRN medications: zyprexa    Staff Report:  Jade has difficult mornings. She initially was quite focused on discharge yet reluctantly accepted that this would not happen tonight. She admitted to  this morning to jump out of her window but said that she was able to work with staff. She seemed to acknowledge that she needs to be sincere with staff about her SI/B and AH, becoming more stable prior to discharge. She was then quite happy to recite some poetry, sing a song with encouragement and then play guitar with a peer in the milieu. Overall she seems more relaxed. Pt with limited I/J due to \"mild intellectual disability,\" needs reinforcement with basic teaching, but responding well to redirection.    Patient Interview:   Michelle Person was interviewed remotely via teleconference. She noted that feels \"great\". She talked to her mother. She denied suicidal ideation, suicidal impulses for the first time today since admission. She woke up at 4 am, she did not have an idea why. She reported that she has headaches, all over. She reported that her hallucinations are more prominent in the morning, which is why she previously tried to kill herself in the mornings. She asked for leaving early however understood that Friday morning would be better after we talked to her.    The risks, benefits, alternatives and side effects of any medication changes have been discussed and are understood by the patient and other caregivers.    Review of systems:     ROS was negative unless noted above.          Allergies: "     Allergies   Allergen Reactions     Ibuprofen Other (See Comments)     Until cleared by primary MD d/t kidney function     Tomato Unknown            Psychiatric Examination:   /75 (BP Location: Left arm)   Pulse 84   Temp 97.9  F (36.6  C) (Oral)   Resp 16   Wt 76 kg (167 lb 9.6 oz)   SpO2 94%   BMI 31.67 kg/m    Weight is 167 lbs 9.6 oz  Body mass index is 31.67 kg/m .    MENTAL STATUS EXAM       Psychiatric Examination:   Appearance:  no apparent distress, obese and hospital scrubs  Attitude:  cooperative, guarded and apathetic  Psychomotor:  normal and no evidence of tics, dystonia, or tardive dyskinesia  Eye Contact: fair  Speech:  fluent English, monotone and decreased rate  Mood: great  Affect:  congruent, appropriate and full range, appeared upset after discussion of discharge  Thought Content: denies suicidal ideation and denies homicidal ideation  Thought Process: linear and coherent  Sensorium: awake, endorses auditory hallucinations and endorses visual hallucinations  Cognition: memory grossly intact and poor intelligence  Impulse control: poor  Insight: questionable  Judgment: questionable             Labs:     No results found for this or any previous visit (from the past 24 hour(s)).     Assessment    Principal Diagnosis:   # Major Depressive Disorder, severe, with psychotic features     Secondary psychiatric diagnoses of concern this admission:   - PTSD by history   - Mild intellectual disability per chart      Diagnostic Impression:   29 y/o AAF with mild intellectual disability and major depression with psychotic features admitted from group home after attempting to cut herself with plastic silverware or jump off a second floor balcony.  Patient identifies anniversary of grandmother's death 15 years ago as the trigger for mood worsening in the past 2 weeks and nonadherence with medication for several days.  Medications restarted at half previous dose except for Depakote DR 1500 mg at  bedtime, as patient has diagnosed seizure disorder.  Gold team to titrate medications back to previous doses if tolerated and deemed appropriate, need to contact group home and patient's mother to identify extent of recent behavioral change or other stressors to determine if this is a relapse of her mood disorder or impulsive act of emotional dysregulation.  Patient has history of previous suicide gestures leading to brief hospitalizations with return to previous residence.    Psychiatric Hospital course:   Michelle Person was [facility] to [facility] as a voluntary patient. Medications restarted at half previous dose except for Depakote DR 1500 mg at bedtime, as patient has diagnosed seizure disorder.  Patient was cooperative in the milieu. On 12/28 This author attempted to contact Dr. Collins, left a message. PTA aripiprazole increased to 20 mg, zoloft increased to 150, depakote increased to 2000 mg today.   On 12/29/2020 Michelle was very emotional and missing her jessi bear. She was continuing to have suicidal impulses and experiencing hallucinations. Aripiprazole dose was increased to 25 mg. Concerns regarding constipation. Prn's were given. On 12/30/2020 All PTA medications were increased to PTA doses. Her mood and suicidal ideation was improved. Hallucinations became less powerful but still there.         Discontinued Medications (& Rationale):  none    Medical course   Admission labs as below:    Data:   Laboratory/Imaging:  -COVID-19: Negative  -Beta hCG: Negative  -Blood alcohol: None detected  -Valproic acid level: 59.0 mcg/mL  -CBC: Within normal limits  -CMP: Notable for albumin low at 3.0 and protein low at 4.6.  All other values within normal limits.   - ECG: QTc: 402 ms, sinus rhythm with sinus arrhythmia, nonspecific ST and T wave abnormality.    Consults:   none    Plan     Today's Changes:  - Zoloft 200 mg -- reached PTA dose is 200 mg  - Depakote 2000 mg at bedtime -seizure disorder, increased to PTA  dose  - Abilify 30 mg -- reached  PTA dose is 30 mg    Psychotropic Medications:  Scheduled Psych Medications:  - zoloft  - depakote  - abilify    PRN Psych Medications  - Hydroxyzine 25 mg PRN Q4H  - Olanzapine 10 mg PO/IM prn Q2H severe agitation/psychosis  - Trazodone 50 mg PRN QHS    Patient will be treated in therapeutic milieu with appropriate individual and group therapies as described.    Medical diagnoses to be addressed this admission:          #.  History of seizure disorder  - Continue valproate, consider increasing to PTA dose if appropriate     #. Asthma, unspecified  - Continue PTA fluticasone, montelukast,albuterol PRN     # Urinary incontinence, unspecified  - Continue PTA oxybutynin     Legal Status:   Orders Placed This Encounter      Voluntary      Safety Assessment:   Behavioral Orders   Procedures     Code 1 - Restrict to Unit     Fall precautions     Routine Programming     As clinically indicated     Self Injury Precaution     Status 15     Every 15 minutes.     Suicide precautions     Patients on Suicide Precautions should have a Combination Diet ordered that includes a Diet selection(s) AND a Behavioral Tray selection for Safe Tray - with utensils, or Safe Tray - NO utensils         Disposition: to group home,  2-3 days pending stabilization and resolving suicidal ideation, back to group home    The patient was seen and the plan was discussed with the attending physician.     This patient was seen and discussed with my attending physician.  OSCAR Pizarro MD, MSc  PGY-1 Psychiatry Resident Physician    Psychiatry Attending Attestation: I, Steve Fagan, saw and evaluated the patient with the resident physician.  I agree with the findings and plan of care as documented in the resident note.  I have reviewed all labs and vital signs.

## 2020-12-30 NOTE — DISCHARGE SUMMARY
"    Psychiatric Discharge Summary    Hospital Day #4    Michelle Person MRN# [id number]   Age: 30 year old YOB: 1990     Date of Admission:  2020  Date of Discharge:  2021  Admitting Physician:  Vaughn Kitchen MD  Discharge Physician:  Steve Fagan MD         Event Leading to Hospitalization:   \" Micehlle \"Sina\"  Raza is a 30 year old -American female with history of mild intellectual disability and with a past psychiatric history of major depressive disorder, recurrent, severe with psychosis and PTSD admitted from the  ER on 2020 due to concern for SI and SIB in the context of 2 days of worsening suicidal ideation, 3-4 days of medication non-adherence, no substance use, and increased  psychosocial stressors including loss and chronic mental health issues. Patient reports on Joseph she began to think more about her  grandmother and has experienced worsening mood and increased suicidal ideation since 20.  On 20 patient was observed by group home staff attempting to self harm using a pen, plastic butter knife, and coat . She then moved a chair near a 2nd story deck and states she would jump off. She reports not taking some of her medications for approximately 3-4 days but is unsure which medications she missed and reports she does not know why she stopped taking them. \"       See Admission note by Vaughn Kitchen MD on 2020 for additional details.          Diagnoses:   #Primary Psychiatric Diagnosis  Major Depressive Disorder, severe, recurrent with psychotic features     #Secondary Psychiatric Diagnosis  PTSD by history   Mild intellectual disability per chart     Diagnostic Impression:   Michelle Person is a 30 year old -American female with a past psychiatric history of MDD, recurrent with psychotic features, PTSD, history of seizure disorder and developmental delay who presented to the ED with SI and s/p suicide attempt in the context " of increased psychosocialstressors. Significant symptoms include SI, depressed and impulsive. Her last psychiatric hospitalization was in 2017 after a suicide attempt.  She is currently followed by Dr. Collins @ at [facility]. Current psychosocial stressors include chronic mental health issues and increased thoughts of  grandmother which she has been coping with by acting out to self.  Patient's support system includes family, outpatient team and peers.  Substance use does not appear to be playing a contributing role in the patient's presentation.   genetic loading is not known. Medical history does appear to be significant for seizures and developmental delay.  Psychologically patient attributes significant distress from reminders of grandmothers death. Socially patient appears well supported by outpatient family and outpatient team. The MSE is notable for passive SI, delayed speech and poor memory. She attempted to injure herself but not accomplished. Her reported symptoms of suicidal are consistent with her historic diagnosis of major depressive disorder.         Consults:   none         Hospital Course:   Psychiatric Course:  She had active SI and s/p suicide attempt, patient warranted inpatient psychiatric hospitalization to maintain her safety.   Michelle Person was [facility] to [facility] with attending Steve Fagan MD as a voluntary patient. PTA medication were aripiprazole, valproate and sertraline. All restarted with a decreased dose and titrated up to outpatient doses.  Patient was cooperative in the milieu. On  This author attempted to contact Dr. Collins, left a message. PTA aripiprazole increased to 20 mg, zoloft increased to 150, depakote increased to 2000 mg today.   On 2020 Michelle was very emotional and missing her jessi bear. She was continuing to have suicidal impulses and experiencing hallucinations. Aripiprazole dose was increased to 25 mg. Concerns  regarding constipation. Prn's were given. On 12/30/2020 All PTA medications were increased to PTA doses. Her mood and suicidal ideation was improved. Hallucinations became less powerful but still there.      Medical Course:     Admission labs as below:     Data:   Laboratory/Imaging:  -COVID-19: Negative  -Beta hCG: Negative  -Blood alcohol: None detected  -Valproic acid level: 59.0 mcg/mL  -CBC: Within normal limits  -CMP: Notable for albumin low at 3.0 and protein low at 4.6.  All other values within normal limits.   - ECG: QTc: 402 ms, sinus rhythm with sinus arrhythmia, nonspecific ST and T wave abnormality.    Discharge labs   No results found for this or any previous visit (from the past 48 hour(s)).    Risk Assessment:      Today Michelle Person denies/reports SI, SIB and HI. No overt evidence of psychosis or magdalene observed. Patient grossly appears to be cognitively intact. Insight and judgement have improved since admission. Patient reports hallucinations though denies the severity of disturbance at admission.  Patient is aware of consequences of non adherence . Patient expresses understanding of the plan and willingness to comply. Patient has/has not exhibited aggressive or violence behaviors since prior to this admission. Throughout patient's stay they were non existent.  she has notable risk factors for self-harm, including psychosis and previous suicide attempts. However, risk is mitigated by history of seeking help when needed and group home setting. Patient does/does not have immediate access to firearms. Therefore, based on all available evidence including the factors cited above, she does not appear to be at imminent risk for self-harm, does not meet criteria for a 72-hr hold, and therefore remains appropriate for ongoing outpatient level of care.  Patient agreed to further reduce risk of self-harm by reaching out to staff at group home and agreed to remain medication adherent. Additional steps taken  to minimize risk include: medication optimization, close psychiatric follow up and provision of crisis resources provided. Patient expressed understanding of risk associated with chronic depression including increased risk of harm to self or others.     Michelle was released to group home. During this admission, she did participate in groups and was visible in the milieu, and her symptoms of suicidal urges improved. At the time of discharge she was determined to not be a danger to herself or others.     This document serves as a transfer of care to Michelle Person's outpatient providers.         Discharge Medications:     Current Discharge Medication List      START taking these medications    Details   docusate sodium (COLACE) 100 MG capsule Take 1 capsule (100 mg) by mouth daily  Qty: 30 capsule, Refills: 0    Associated Diagnoses: Drug-induced constipation      !! fish oil-omega-3 fatty acids 1000 MG capsule Take 1 capsule (1 g) by mouth 3 times daily  Qty: 30 capsule, Refills: 0    Associated Diagnoses: Vitamin D deficiency      fluticasone (ARNUITY ELLIPTA) 100 MCG/ACT inhaler Inhale 1 puff into the lungs daily  Qty: 1 each, Refills: 0    Associated Diagnoses: Uncomplicated asthma, unspecified asthma severity, unspecified whether persistent      OLANZapine (ZYPREXA) 10 MG tablet Take 1 tablet (10 mg) by mouth 3 times daily as needed (associated with psychosis or magdalene)  Qty: 30 tablet, Refills: 0    Associated Diagnoses: Depression, major, recurrent, severe with psychosis (H); Psychosis, affective (H); Suicidal ideation      polyethylene glycol (MIRALAX) 17 g packet Take 17 g by mouth daily as needed for constipation  Qty: 30 packet, Refills: 0    Associated Diagnoses: Drug-induced constipation      sennosides (SENOKOT) 8.6 MG tablet Take 1 tablet by mouth 2 times daily as needed for constipation  Qty: 30 tablet, Refills: 0    Associated Diagnoses: Drug-induced constipation       !! - Potential duplicate  medications found. Please discuss with provider.      CONTINUE these medications which have CHANGED    Details   albuterol (PROAIR HFA) 108 (90 Base) MCG/ACT inhaler Inhale 2 puffs into the lungs daily as needed for wheezing  Qty: 1 Inhaler, Refills: 0    Comments: Pharmacy may dispense brand covered by insurance (Proair, or proventil or ventolin or generic albuterol inhaler)  Associated Diagnoses: Uncomplicated asthma, unspecified asthma severity, unspecified whether persistent      ARIPiprazole (ABILIFY) 30 MG tablet Take 1 tablet (30 mg) by mouth daily  Qty: 30 tablet, Refills: 0    Associated Diagnoses: Psychosis, affective (H)      divalproex sodium delayed-release (DEPAKOTE) 500 MG DR tablet Take 4 tablets (2,000 mg) by mouth At Bedtime  Qty: 120 tablet, Refills: 0    Associated Diagnoses: Seizure disorder (H)      fluticasone (FLOVENT DISKUS) 100 MCG/BLIST inhaler Inhale 1 puff into the lungs every 12 hours  Qty: 1 Inhaler, Refills: 0    Associated Diagnoses: Uncomplicated asthma, unspecified asthma severity, unspecified whether persistent      medroxyPROGESTERone (DEPO-PROVERA) 150 MG/ML IM injection Inject 1 mL (150 mg) into the muscle every 3 months Last injection was on 12/10/20 per group home  Qty: 1 mL, Refills: 0    Associated Diagnoses: Encounter for contraceptive management, unspecified type      montelukast (SINGULAIR) 10 MG tablet Take 1 tablet (10 mg) by mouth At Bedtime  Qty: 30 tablet, Refills: 0    Associated Diagnoses: Uncomplicated asthma, unspecified asthma severity, unspecified whether persistent      oxybutynin ER (DITROPAN XL) 15 MG 24 hr tablet Take 1 tablet (15 mg) by mouth daily  Qty: 30 tablet, Refills: 0    Associated Diagnoses: Overactive bladder      pantoprazole (PROTONIX) 40 MG EC tablet Take 1 tablet (40 mg) by mouth daily  Qty: 30 tablet, Refills: 0    Associated Diagnoses: Peptic ulcer      SENNA-docusate sodium (SENNA S) 8.6-50 MG tablet Take 1 tablet by mouth At  Bedtime  Qty: 30 tablet, Refills: 0    Associated Diagnoses: Drug-induced constipation      sertraline (ZOLOFT) 100 MG tablet Take 2 tablets (200 mg) by mouth At Bedtime  Qty: 60 tablet, Refills: 0    Associated Diagnoses: Depression, major, recurrent, severe with psychosis (H)      Vitamin D3 (CHOLECALCIFEROL) 25 mcg (1000 units) tablet Take 1 tablet (25 mcg) by mouth daily  Qty: 30 tablet, Refills: 0    Associated Diagnoses: Vitamin D deficiency         CONTINUE these medications which have NOT CHANGED    Details   ketoconazole (NIZORAL) 2 % external cream Apply 1 Application topically 2 times daily as needed Apply to skin fold      !! Omega-3 Fatty Acids (FISH OIL PO) Take 1 capsule by mouth 3 times daily        !! - Potential duplicate medications found. Please discuss with provider.               Psychiatric Examination:   Appearance:  no apparent distress and obese  Attitude:  cooperative  Psychomotor:  normal and no evidence of tics, dystonia, or tardive dyskinesia  Eye Contact: fair  Speech:  fluent English  Mood: okay  Affect:  congruent  Thought Content: passive suicidal ideation  Thought Process: linear and coherent  Sensorium: awake and alert  Cognition: memory grossly intact  Impulse control: fair  Insight: fair  Judgment: questionable         Discharge Plan:     Psychiatry Follow-up:   Appointment:  Therapist: Dorota Lamb: 1/14/21 at 11:00am (phone visit)  [facility]: [address] [place] [address]  [phone]  Fax [phone]    Appointment: Psychiatry:  Dr. Solis: 1/15/21 at 11:00am (video visit)  [facility]: [address] [place] [address]  [phone]  Fax [phone]    Cadi : Ellyn Singh:  [phone]   : Jana Slade: [phone]     Manager: Amanda: [phone]    Resources:   *[facility] Crisis: [phone]    24/7 crisis hotline for adults who are in the midst of a  mental health crisis  *[facility]: [address] [place]  [address]    Walk-in services include access to an onsite team of psychiatrists, nurses, social workers and trained peer support staff that provide person-centered, recovery-focused care. Urgent Care for Adult Mental Health offers an alternative to visiting the emergency room during a mental health crisis.   Monday through Friday, 8 a.m. - 5:30 p.m.  Closed on Saturday, Sunday and holidays      General Medication Instructions:   See your medication sheet(s) for instructions.   Take all medicines as directed.  Make no changes unless your doctor suggests them.   Go to all your doctor visits.  Be sure to have all your required lab tests. This way, your medicines can be refilled on time.  Do not use any drugs not prescribed by your doctor.    The treatment team has appreciated the opportunity to work with you.  We wish you the best in the future.    If you have any questions or concerns our unit number is [phone].         Pt seen and discussed with my attending physician, Steve Fagan MD.   OSCAR Pizarro MD  PGY-1 Resident    Attestation:  ISteve, saw and evaluated the patient with the resident physician.  I agree with the findings and plan of care as documented in the resident note.  I have reviewed all labs and vital signs.  I, Steve Fagan, have reviewed this summary and agree with the findings and discharge plan as written.            Appendix A: All Labs This Admission:     Results for orders placed or performed during the hospital encounter of 12/26/20   CBC with platelets     Status: None   Result Value Ref Range    WBC 8.7 4.0 - 11.0 10e9/L    RBC Count 4.15 3.8 - 5.2 10e12/L    Hemoglobin 11.7 11.7 - 15.7 g/dL    Hematocrit 35.3 35.0 - 47.0 %    MCV 85 78 - 100 fl    MCH 28.2 26.5 - 33.0 pg    MCHC 33.1 31.5 - 36.5 g/dL    RDW 12.8 10.0 - 15.0 %    Platelet Count 238 150 - 450 10e9/L    Comprehensive metabolic panel     Status: Abnormal   Result Value Ref Range    Sodium 141 133 - 144 mmol/L    Potassium 4.7 3.4 - 5.3 mmol/L    Chloride 108 94 - 109 mmol/L    Carbon Dioxide 29 20 - 32 mmol/L    Anion Gap 4 3 - 14 mmol/L    Glucose 79 70 - 99 mg/dL    Urea Nitrogen 17 7 - 30 mg/dL    Creatinine 0.90 0.52 - 1.04 mg/dL    GFR Estimate 85 >60 mL/min/[1.73_m2]    GFR Estimate If Black >90 >60 mL/min/[1.73_m2]    Calcium 8.8 8.5 - 10.1 mg/dL    Bilirubin Total 0.2 0.2 - 1.3 mg/dL    Albumin 3.0 (L) 3.4 - 5.0 g/dL    Protein Total 6.4 (L) 6.8 - 8.8 g/dL    Alkaline Phosphatase 44 40 - 150 U/L    ALT 18 0 - 50 U/L    AST 14 0 - 45 U/L   TSH with free T4 reflex and/or T3 as indicated     Status: None   Result Value Ref Range    TSH 3.48 0.40 - 4.00 mU/L   EKG 12-lead, tracing only     Status: None (Preliminary result)   Result Value Ref Range    Interpretation ECG Click View Image link to view waveform and result

## 2020-12-30 NOTE — PLAN OF CARE
"\"Jade\" as she referred to herself in initial MT group tonight, engaged in Music Therapy this evening by writing and sharing lyrics to instrumental beats.  The nature of her writing was very real and uplifting, affirming herself and what she wants for her life going forward.  She self-initiated this process during group, and shared her lyrics for the group.  Her affect was calm and cooperative.  She does appear to have some disinhibition/cognitive uniqueness.   "

## 2020-12-30 NOTE — DISCHARGE INSTRUCTIONS
Behavioral Discharge Planning and Instructions    Summary:   You were admitted to [facility] on 12/26/20 with worsening depression, SI and SIB thoughts under the care of Dr. Fagan.  You met with Dr. Fagan and his team daily for ongoing psychiatric assessment and medication management.  You had opportunities to participate in therapeutic groups on the unit.   At this time you report your mood is stabilizing and you report you are not having thoughts or intent to harm yourself or others. You will be discharged home and will resume care with your outpatient providers.    Disposition:  Group Home      Main Diagnosis:   Major Depressive Disorder  PTSD      Major Treatments, Procedures and Findings:   Medications were  managed throughout your stay. An internal medicine consult was completed during your stay. You had the opportunity to participate in treatment programming while on the unit including occupational therapy, mental health support and education and spiritual services.     Symptoms to Report:   Please report if you are experiencing increased aggression and/or confusion, problematic loss of sleep, worsening mood, or thoughts of suicide to your treatment team or notify your primary provider.   IF THE SYMPTOMS YOU ARE EXPERIENCING ARE A MEDICAL EMERGENCY, CALL 911 IMMEDIATELY    Lifestyle Adjustment:   1. Adjust your lifestyle to get enough sleep, relaxation, exercise and good nutrition.  Continue to develop healthy coping skills to decrease stress and promote a healthy and sober lifestyle.  2. Abstain from all substances of abuse.  3. Take medications as prescribed.  Please work with your doctor to discuss any concerns you have with your medications or side effects you may be experiencing.  4. Follow up with appointments as scheduled.        Psychiatry Follow-up:   Appointment:  Therapist: Dorota Lamb: 1/14/21 at 11:00am (phone visit)  [facility]: [address] [place] [address]  [phone]  Fax [phone]    Appointment: Psychiatry:  Dr. Solis: 1/15/21 at 11:00am (video visit)  [facility]: [address] [facility] [address] [place] [address]  [phone]  Fax [phone]    Cadi : Ellyn Singh:  [phone]   : Jana Slade: [phone]     Manager: Amanda: [phone]    Resources:   *[facility] Crisis: [phone]    24/7 crisis hotline for adults who are in the midst of a mental health crisis  *[facility]: [address] [place] [address]  [address]    Walk-in services include access to an onsite team of psychiatrists, nurses, social workers and trained peer support staff that provide person-centered, recovery-focused care. Urgent Care for Adult Mental Health offers an alternative to visiting the emergency room during a mental health crisis.   Monday through Friday, 8 a.m. - 5:30 p.m.  Closed on Saturday, Sunday and holidays      General Medication Instructions:   See your medication sheet(s) for instructions.   Take all medicines as directed.  Make no changes unless your doctor suggests them.   Go to all your doctor visits.  Be sure to have all your required lab tests. This way, your medicines can be refilled on time.  Do not use any drugs not prescribed by your doctor.    The treatment team has appreciated the opportunity to work with you.  We wish you the best in the future.    If you have any questions or concerns our unit number is [phone].

## 2020-12-30 NOTE — PLAN OF CARE
Pt affect bright pleasant and cooperative.  Pt denies SI/SIB, excited to discharge back to group home an [facility].  Pt went to groups.  Showered, eating meals.

## 2020-12-31 ENCOUNTER — COMMUNICATION - HEALTHEAST (OUTPATIENT)
Dept: CARE COORDINATION | Facility: CLINIC | Age: 30
End: 2020-12-31

## 2020-12-31 LAB
ALBUMIN SERPL-MCNC: 2.9 G/DL (ref 3.4–5)
ALP SERPL-CCNC: 47 U/L (ref 40–150)
ALT SERPL W P-5'-P-CCNC: 20 U/L (ref 0–50)
ANION GAP SERPL CALCULATED.3IONS-SCNC: 4 MMOL/L (ref 3–14)
AST SERPL W P-5'-P-CCNC: 12 U/L (ref 0–45)
BASOPHILS # BLD AUTO: 0.1 10E9/L (ref 0–0.2)
BASOPHILS NFR BLD AUTO: 0.6 %
BILIRUB SERPL-MCNC: 0.2 MG/DL (ref 0.2–1.3)
BUN SERPL-MCNC: 22 MG/DL (ref 7–30)
CALCIUM SERPL-MCNC: 8.7 MG/DL (ref 8.5–10.1)
CHLORIDE SERPL-SCNC: 110 MMOL/L (ref 94–109)
CO2 SERPL-SCNC: 27 MMOL/L (ref 20–32)
CREAT SERPL-MCNC: 0.9 MG/DL (ref 0.52–1.04)
DEPRECATED CALCIDIOL+CALCIFEROL SERPL-MC: 38 UG/L (ref 20–75)
DIFFERENTIAL METHOD BLD: ABNORMAL
EOSINOPHIL # BLD AUTO: 0.3 10E9/L (ref 0–0.7)
EOSINOPHIL NFR BLD AUTO: 3.8 %
ERYTHROCYTE [DISTWIDTH] IN BLOOD BY AUTOMATED COUNT: 13.1 % (ref 10–15)
GFR SERPL CREATININE-BSD FRML MDRD: 85 ML/MIN/{1.73_M2}
GLUCOSE SERPL-MCNC: 97 MG/DL (ref 70–99)
HCT VFR BLD AUTO: 35.1 % (ref 35–47)
HGB BLD-MCNC: 11.5 G/DL (ref 11.7–15.7)
IMM GRANULOCYTES # BLD: 0 10E9/L (ref 0–0.4)
IMM GRANULOCYTES NFR BLD: 0.4 %
INTERPRETATION ECG - MUSE: NORMAL
LYMPHOCYTES # BLD AUTO: 3.5 10E9/L (ref 0.8–5.3)
LYMPHOCYTES NFR BLD AUTO: 42.9 %
MCH RBC QN AUTO: 28.5 PG (ref 26.5–33)
MCHC RBC AUTO-ENTMCNC: 32.8 G/DL (ref 31.5–36.5)
MCV RBC AUTO: 87 FL (ref 78–100)
MONOCYTES # BLD AUTO: 0.6 10E9/L (ref 0–1.3)
MONOCYTES NFR BLD AUTO: 7.1 %
NEUTROPHILS # BLD AUTO: 3.7 10E9/L (ref 1.6–8.3)
NEUTROPHILS NFR BLD AUTO: 45.2 %
NRBC # BLD AUTO: 0 10*3/UL
NRBC BLD AUTO-RTO: 0 /100
PLATELET # BLD AUTO: 215 10E9/L (ref 150–450)
POTASSIUM SERPL-SCNC: 4.6 MMOL/L (ref 3.4–5.3)
PROT SERPL-MCNC: 6.3 G/DL (ref 6.8–8.8)
RBC # BLD AUTO: 4.03 10E12/L (ref 3.8–5.2)
SODIUM SERPL-SCNC: 141 MMOL/L (ref 133–144)
WBC # BLD AUTO: 8.1 10E9/L (ref 4–11)

## 2020-12-31 PROCEDURE — 85025 COMPLETE CBC W/AUTO DIFF WBC: CPT | Performed by: STUDENT IN AN ORGANIZED HEALTH CARE EDUCATION/TRAINING PROGRAM

## 2020-12-31 PROCEDURE — 250N000013 HC RX MED GY IP 250 OP 250 PS 637: Performed by: STUDENT IN AN ORGANIZED HEALTH CARE EDUCATION/TRAINING PROGRAM

## 2020-12-31 PROCEDURE — 250N000013 HC RX MED GY IP 250 OP 250 PS 637

## 2020-12-31 PROCEDURE — 36415 COLL VENOUS BLD VENIPUNCTURE: CPT | Performed by: STUDENT IN AN ORGANIZED HEALTH CARE EDUCATION/TRAINING PROGRAM

## 2020-12-31 PROCEDURE — 80053 COMPREHEN METABOLIC PANEL: CPT | Performed by: STUDENT IN AN ORGANIZED HEALTH CARE EDUCATION/TRAINING PROGRAM

## 2020-12-31 PROCEDURE — 82306 VITAMIN D 25 HYDROXY: CPT | Performed by: STUDENT IN AN ORGANIZED HEALTH CARE EDUCATION/TRAINING PROGRAM

## 2020-12-31 PROCEDURE — 124N000002 HC R&B MH UMMC

## 2020-12-31 PROCEDURE — G0177 OPPS/PHP; TRAIN & EDUC SERV: HCPCS

## 2020-12-31 PROCEDURE — 250N000013 HC RX MED GY IP 250 OP 250 PS 637: Performed by: PSYCHIATRY & NEUROLOGY

## 2020-12-31 RX ORDER — ALBUTEROL SULFATE 90 UG/1
2 AEROSOL, METERED RESPIRATORY (INHALATION) 2 TIMES DAILY
Status: DISCONTINUED | OUTPATIENT
Start: 2020-12-31 | End: 2021-01-01 | Stop reason: HOSPADM

## 2020-12-31 RX ORDER — ALBUTEROL SULFATE 90 UG/1
2 AEROSOL, METERED RESPIRATORY (INHALATION) 2 TIMES DAILY PRN
Status: DISCONTINUED | OUTPATIENT
Start: 2020-12-31 | End: 2020-12-31

## 2020-12-31 RX ADMIN — ACETAMINOPHEN 650 MG: 325 TABLET, FILM COATED ORAL at 04:18

## 2020-12-31 RX ADMIN — MONTELUKAST 10 MG: 10 TABLET, FILM COATED ORAL at 19:15

## 2020-12-31 RX ADMIN — ALBUTEROL SULFATE 2 PUFF: 90 AEROSOL, METERED RESPIRATORY (INHALATION) at 19:15

## 2020-12-31 RX ADMIN — Medication 1 G: at 17:59

## 2020-12-31 RX ADMIN — ALUMINUM HYDROXIDE, MAGNESIUM HYDROXIDE, AND SIMETHICONE 30 ML: 200; 200; 20 SUSPENSION ORAL at 18:36

## 2020-12-31 RX ADMIN — ALUMINUM HYDROXIDE, MAGNESIUM HYDROXIDE, AND SIMETHICONE 30 ML: 200; 200; 20 SUSPENSION ORAL at 09:54

## 2020-12-31 RX ADMIN — ALBUTEROL SULFATE 2 PUFF: 90 AEROSOL, METERED RESPIRATORY (INHALATION) at 11:46

## 2020-12-31 RX ADMIN — PANTOPRAZOLE SODIUM 40 MG: 40 TABLET, DELAYED RELEASE ORAL at 08:03

## 2020-12-31 RX ADMIN — Medication 1000 UNITS: at 08:03

## 2020-12-31 RX ADMIN — DIVALPROEX SODIUM 2000 MG: 500 TABLET, DELAYED RELEASE ORAL at 19:14

## 2020-12-31 RX ADMIN — SERTRALINE HYDROCHLORIDE 200 MG: 100 TABLET ORAL at 19:15

## 2020-12-31 RX ADMIN — OXYBUTYNIN CHLORIDE 15 MG: 5 TABLET, EXTENDED RELEASE ORAL at 08:03

## 2020-12-31 RX ADMIN — Medication 1 G: at 08:36

## 2020-12-31 RX ADMIN — ARIPIPRAZOLE 30 MG: 10 TABLET ORAL at 08:03

## 2020-12-31 RX ADMIN — FLUTICASONE FUROATE 1 PUFF: 100 POWDER RESPIRATORY (INHALATION) at 08:04

## 2020-12-31 RX ADMIN — DOCUSATE SODIUM 100 MG: 100 CAPSULE, LIQUID FILLED ORAL at 08:03

## 2020-12-31 ASSESSMENT — ACTIVITIES OF DAILY LIVING (ADL)
HYGIENE/GROOMING: INDEPENDENT
ORAL_HYGIENE: INDEPENDENT
DRESS: INDEPENDENT;SCRUBS (BEHAVIORAL HEALTH)
LAUNDRY: WITH SUPERVISION

## 2020-12-31 NOTE — PROGRESS NOTES
The patient was visible in milieu, pacing the hallway while listening headphone. The patient initial discharging tomorrow and expressed enthusiastic voice. Patient denies SI/SIB as well as visual/auditory hallucinations. Overall, pt mood calm, cooperative with staff, isolated from peers interaction. Pt full-range effect, pleasant approach and has no concerns to report. The staff ordered earlier lunch tray for tomorrow. Will continue monitoring Pt and intact safety.

## 2020-12-31 NOTE — PROGRESS NOTES
Pt denied SI and SIB.  Pt ate supper and was visible in the milieu.  Pt reported she had a good day.  Pt is independent with ADL's.

## 2020-12-31 NOTE — PLAN OF CARE
Problem: Behavioral Health Plan of Care  Goal: Plan of Care Review  Outcome: No Change  Flowsheets (Taken 12/31/2020 1300)  Plan of Care Reviewed With: patient  Patient Agreement with Plan of Care: agrees    Pt will discharge tomorrow AM. Pt had a good shift. No SI/SIB or hallucinations.

## 2020-12-31 NOTE — PROGRESS NOTES
Pt did c/o headache rating 6/10; PRN  Tylenol given as ordered - was helpful. Pt was able to fall asleep thereafter. Slept a total of 6.25 hours; remained in her room the whole shift. Will cont to monitor and offer support.

## 2020-12-31 NOTE — PLAN OF CARE
Daily CTC Note:     Work Completed:   The patient's care was discussed with the treatment team and chart notes were reviewed.   Patient continues to deny any SI- would like to discharge tomorrow. HA improved.Patient independent with ADL's.  Writer spoke to  and they are comfortable with this plan.  MAR faxed to them and MD ordered meds to [facility].   Patient will be sent home via cab.  Writer left message with patient's CM with update.      Discharge plan or goal:   Return to   Resume care with outpatient providers - appts in place     Barriers to discharge:   Plan to discharge tomorrow

## 2020-12-31 NOTE — PLAN OF CARE
Patient is scheduled for  tomorrow- Friday Jan 1 at 1:00pm.  [employer] is transport company.  They will contact Nursing station when they arrive to the [facility].  Patient and Silvano WASHINGTON provided this information.  Early lunch has been ordered

## 2021-01-01 VITALS
OXYGEN SATURATION: 99 % | TEMPERATURE: 98.1 F | DIASTOLIC BLOOD PRESSURE: 74 MMHG | SYSTOLIC BLOOD PRESSURE: 119 MMHG | WEIGHT: 173.4 LBS | BODY MASS INDEX: 32.76 KG/M2 | RESPIRATION RATE: 16 BRPM | HEART RATE: 90 BPM

## 2021-01-01 PROCEDURE — 250N000013 HC RX MED GY IP 250 OP 250 PS 637: Performed by: STUDENT IN AN ORGANIZED HEALTH CARE EDUCATION/TRAINING PROGRAM

## 2021-01-01 PROCEDURE — 99238 HOSP IP/OBS DSCHRG MGMT 30/<: CPT | Mod: GC | Performed by: PSYCHIATRY & NEUROLOGY

## 2021-01-01 PROCEDURE — 250N000013 HC RX MED GY IP 250 OP 250 PS 637: Performed by: PSYCHIATRY & NEUROLOGY

## 2021-01-01 RX ADMIN — Medication 1 G: at 07:43

## 2021-01-01 RX ADMIN — ACETAMINOPHEN 650 MG: 325 TABLET, FILM COATED ORAL at 01:31

## 2021-01-01 RX ADMIN — DOCUSATE SODIUM 100 MG: 100 CAPSULE, LIQUID FILLED ORAL at 07:43

## 2021-01-01 RX ADMIN — ALBUTEROL SULFATE 2 PUFF: 90 AEROSOL, METERED RESPIRATORY (INHALATION) at 07:48

## 2021-01-01 RX ADMIN — Medication 1000 UNITS: at 07:43

## 2021-01-01 RX ADMIN — PANTOPRAZOLE SODIUM 40 MG: 40 TABLET, DELAYED RELEASE ORAL at 07:43

## 2021-01-01 RX ADMIN — FLUTICASONE FUROATE 1 PUFF: 100 POWDER RESPIRATORY (INHALATION) at 07:43

## 2021-01-01 RX ADMIN — ARIPIPRAZOLE 30 MG: 10 TABLET ORAL at 07:43

## 2021-01-01 RX ADMIN — OXYBUTYNIN CHLORIDE 15 MG: 5 TABLET, EXTENDED RELEASE ORAL at 07:43

## 2021-01-01 RX ADMIN — OLANZAPINE 10 MG: 10 TABLET, FILM COATED ORAL at 07:44

## 2021-01-01 ASSESSMENT — ACTIVITIES OF DAILY LIVING (ADL)
DRESS: STREET CLOTHES;INDEPENDENT
ORAL_HYGIENE: INDEPENDENT
HYGIENE/GROOMING: INDEPENDENT

## 2021-01-01 NOTE — PROGRESS NOTES
Pt denied SI and SIB.  Pt ate supper, visible in the milieu and socialized with others.  Pt reported she had a good day and she is discharging tomorrow.  Pt is independent with ADL's.

## 2021-01-01 NOTE — PLAN OF CARE
Pt discharging as per order.  Pt denies SI.  Reviewed discharge paperwork with pt.  Pt pleasant and cooperative.  Pt discharged back to group home via medical ride.

## 2021-01-01 NOTE — PROGRESS NOTES
Patient slept a total of 6.25 hours this night shift.  Interrupted sleep due to HA and request or Tylenol (given 650 mg prn for HA).  No snacks requested or given.  Will continue to support and monitor patient who appears to have an AM discharge scheduled for today.

## 2021-01-01 NOTE — PROGRESS NOTES
Pt discharged as per order.  Pt denies SI.  Reviewed discharge paperwork with pt  Pt pleasant and cooperative.  Reviewed discharge paperwork with pt.  Pt discharged with al belongings and discharge paperwork.  Pt picked up by [facility] [employer] to go back to group home.

## 2021-01-12 ENCOUNTER — COMMUNICATION - HEALTHEAST (OUTPATIENT)
Dept: CARE COORDINATION | Facility: CLINIC | Age: 31
End: 2021-01-12

## 2021-02-17 DIAGNOSIS — J45.909 UNCOMPLICATED ASTHMA, UNSPECIFIED ASTHMA SEVERITY, UNSPECIFIED WHETHER PERSISTENT: ICD-10-CM

## 2021-05-20 ENCOUNTER — HOSPITAL ENCOUNTER (EMERGENCY)
Dept: EMERGENCY MEDICINE | Facility: HOSPITAL | Age: 31
Discharge: HOME | End: 2021-05-20
Attending: EMERGENCY MEDICINE
Payer: MEDICARE

## 2021-05-20 DIAGNOSIS — T17.308A CHOKING, INITIAL ENCOUNTER: ICD-10-CM

## 2021-05-20 DIAGNOSIS — J45.901 MILD ASTHMA WITH EXACERBATION, UNSPECIFIED WHETHER PERSISTENT: ICD-10-CM

## 2021-05-20 ASSESSMENT — MIFFLIN-ST. JEOR: SCORE: 1423.04

## 2021-05-27 VITALS — HEIGHT: 61 IN | WEIGHT: 169.9 LBS | BODY MASS INDEX: 32.08 KG/M2

## 2021-06-02 VITALS — HEIGHT: 60 IN | WEIGHT: 177.2 LBS | BODY MASS INDEX: 34.79 KG/M2

## 2021-06-11 NOTE — PROGRESS NOTES
Clinic Care Coordination Contact    Follow Up Progress Note   Referral Source: IP Handoff([facility])   Hospital Discharge: 8/28/20     Contact for updates: () Amanda ph:[phone]     Assessment: Left message for , Amanda, to call back with update on patient.    Goals addressed this encounter:   Goals Addressed                 This Visit's Progress      Psychosocial (pt-stated)   On track     Goal Statement:  I want to stay out of the hospital for the next 90 days.  Date Goal set:  9/8/2020  Barriers:  Recent hospitalization(s), Cognitive delay  Strengths:  Able to identify and advocate for needs, good support system from family  Date to Achieve By:  11/26/20 (based on hospital discharge date of 8/28/20)  Patient expressed understanding of goal:  Yes  Action steps to achieve this goal:  1. I will take my medications as prescribed  2. I will attend all my appts as scheduled, and recommended follow up appts  3. Staff will call my clinic if I start to feel sick or notice any change(s) in my health, and schedule an appt if needed  4. Staff will call the triage nurse line for advice, before going to the hospital  5. Staff will take me to  UC or Walk-In-Clinic before going to the hospital, if I am unable to get an appt with my PCP  6. Staff will update Care Coordination team during outreach calls and ask for additional support or resources, if needed          Psychosocial #2 (pt-stated)   On track     Goal Statement: I would like to be able to start my day program at [facility] in the next two months.  Date Goal set: 9/8/20  Barriers: Medical condition/Covid  Strengths: Supportive  staff  Date to Achieve By: Two months  Patient expressed understanding of goal: NA  Action steps to achieve this goal:  1. I will attend my annual review where starting my day program will be discussed  2. My , Amanda, will inform [facility] if additional resources or support is needed.                   Plan:    will update note when Amanda calls back. Next outreach will be in two weeks.

## 2021-06-11 NOTE — PROGRESS NOTES
Clinic Care Coordination Contact  Patient was discharged to a group home Appointment with the SW on 9/8 to connect with the group home at 10:00

## 2021-06-12 NOTE — PROGRESS NOTES
Clinic Care Coordination Contact    Follow Up Progress Note   BPCI-A: Hospital discharge 8/28     Assessment:  called and spoke with Amanda,  manager. Amanda shares that patient is doing very well and has had no indications that pneumonia is returning since discharge. The plan is for patient to start back at her day program next Monday, which she is very happy about.  Goals addressed this encounter:   Goals Addressed                 This Visit's Progress      Psychosocial (pt-stated)   On track     Goal Statement:  I want to stay out of the hospital for the next 90 days.  Date Goal set:  9/8/2020  Barriers:  Recent hospitalization(s), Cognitive delay  Strengths:  Able to identify and advocate for needs, good support system from family  Date to Achieve By:  11/26/20 (based on hospital discharge date of 8/28/20)  Patient expressed understanding of goal:  Yes  Action steps to achieve this goal:  1. I will take my medications as prescribed  2. I will attend all my appts as scheduled, and recommended follow up appts  3. Staff will call my clinic if I start to feel sick or notice any change(s) in my health, and schedule an appt if needed  4. Staff will call the triage nurse line for advice, before going to the hospital  5. Staff will take me to  UC or Walk-In-Clinic before going to the hospital, if I am unable to get an appt with my PCP  6. Staff will update Care Coordination team during outreach calls and ask for additional support or resources, if needed          Psychosocial #2 (pt-stated)   On track     Goal Statement: I would like to be able to start my day program at [facility] in the next two months.  Date Goal set: 9/8/20  Barriers: Medical condition/Covid  Strengths: Supportive  staff  Date to Achieve By: Two months  Patient expressed understanding of goal: NA  Action steps to achieve this goal:  1. I will attend my annual review where starting my day program will be discussed(In progress, plans to start  next Monday)  2. My , Amanda, will inform [facility] if additional resources or support is needed.    Updated 10/6 ZOLTAN                    Plan:  will follow up in two weeks per BPCI-A workflow standards.
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
## 2021-06-14 NOTE — PROGRESS NOTES
Clinic Care Coordination Contact    Situation:  Patient chart reviewed by RN Care Coordinator for BPCI-A graduation approval.    Background:  Patient was hospitalized at [facility] from 8/22/20 to 8/28/20 with acquired pneumonia, and has been followed by Care Coordination for 90 day post hospitalization monitoring, per BPCI-A guidelines.            Assessment:  90 day BPCI-A monitoring ended 11/26/20.     Plan:  BPCI-A monitoring completed.  Patient's primary care provider is outside the [facility] system.  No further follow up needed.  CCRC team removed from care team, and BPCI-A episode resolved.  Graduation letter sent via letter.        L[facility] Care Coordinator
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
## 2021-06-17 NOTE — ED TRIAGE NOTES
Pt reports getting a piece of apple stuck in throat in past hour, after which she felt like her asthma was exacerbated and she felt like her heart was beating fast.

## 2021-06-17 NOTE — ED PROVIDER NOTES
"EMERGENCY DEPARTMENT ENCOUNTER      IMPRESSION  Asthma  Choking spell      MEDICAL DECISION MAKING    Patient referred to the emergency department for choking spell which precipitated her asthma.  She did use her inhaler.  Symptoms have since cleared and she is asymptomatic.    Exam she is awake and alert.  She speaks in full sentences.  No stridor.  Lungs are clear and heart is regular.    Seem to have cleared any oral foreign body in her asthma has resolved.  She is stable to discharge back to her care provider      CHIEF COMPLAINT    Chief Complaint   Patient presents with     Airway Obstruction       HPI    Michelle Person is a 30 y.o. female with a pertinent past medical history ofd depression, GERD, asthma, intellectual disability, and paraesophageal hernia who presents to this ED unaccompanied via walk-in for evaluation of choking.    Around 12:00 PM (4 hours ago), patient was eating an apple for lunch when she started choking on a piece of apple. Group home staff performed the Heimlich maneuver and successfully dislodged the apple. After this, patient states she felt her asthma flare-up. While walking to get her inhaler, she reports one episode of \"racing\" palpitations. All of her symptoms have since completely resolved. At present, she feels at her baseline. No other complaints at this time.     Social history: Patient lives in a group home.      PAST MEDICAL HISTORY    Past Surgical History:   Procedure Laterality Date     CARDIAC SURGERY      pt reports as an infant, unable to obtain records     PICC MIDLINE INSERTION  8/23/2020          MO PELVIC EXAMINATION W ANESTH N/A 12/13/2018    Procedure: EXAM UNDER ANESTHESIA,  DRAINAGE OF ABCESS;  Surgeon: Caryn Ortiz MD;  [facility]: [facility];  Service: Gynecology     WISDOM TOOTH EXTRACTION         Past Medical History:   Diagnosis Date     Asthma      Depression      Morbid obesity (H)      MR (mental retardation)      Psychosis (H)      " Seizures (H)      Sleep apnea      Suicidal ideation      Suicide attempt (H)        CURRENT MEDICATIONS    Patient's Medications   New Prescriptions    No medications on file   Previous Medications    ALBUTEROL (PROAIR HFA;PROVENTIL HFA;VENTOLIN HFA) 90 MCG/ACTUATION INHALER    Inhale 2 puffs 4 (four) times a day as needed for wheezing.    ARIPIPRAZOLE (ABILIFY) 30 MG TABLET    Take 30 mg by mouth at bedtime.     CHOLECALCIFEROL, VITAMIN D3, 1,000 UNIT TABLET    Take 1,000 Units by mouth daily.    DIVALPROEX (DEPAKOTE DR) 500 MG 12 HOUR TABLET    Take 500 mg by mouth daily before breakfast.    DIVALPROEX (DEPAKOTE DR) 500 MG 12 HOUR TABLET    Take 1,500 mg by mouth at bedtime.    DOCUSATE SODIUM (COLACE) 100 MG CAPSULE    Take 100 mg by mouth daily.    FLUTICASONE FUROATE 100 MCG/ACTUATION DSDV    Inhale 1 puff 2 (two) times a day.     MONTELUKAST (SINGULAIR) 10 MG TABLET    Take 10 mg by mouth at bedtime.    OMEGA 3-DHA-EPA-FISH OIL (FISH OIL) 1,200 (144-216) MG CAP    Take 1 capsule by mouth 3 (three) times a day.    OXYBUTYNIN (DITROPAN XL) 15 MG 24 HR TABLET    Take 15 mg by mouth daily.    PANTOPRAZOLE (PROTONIX) 40 MG TABLET    Take 40 mg by mouth daily 1600.          SENNA (SENOKOT) 8.6 MG TABLET    Take 1 tablet by mouth daily.    SERTRALINE (ZOLOFT) 100 MG TABLET    Take 200 mg by mouth at bedtime.   Modified Medications    No medications on file   Discontinued Medications    No medications on file       ALLERGIES    Allergies   Allergen Reactions     Tomato Unknown       FAMILY HISTORY    Family History   Problem Relation Age of Onset     Hypertension Mother        SOCIAL HISTORY    Social History     Socioeconomic History     Marital status: Single     Spouse name: Not on file     Number of children: Not on file     Years of education: Not on file     Highest education level: Not on file   Occupational History     Not on file   Social Needs     Financial resource strain: Not very hard     Food insecurity  "    Worry: Never true     Inability: Never true     Transportation needs     Medical: No     Non-medical: No   Tobacco Use     Smoking status: Former Smoker     Smokeless tobacco: Never Used   Substance and Sexual Activity     Alcohol use: No     Drug use: No     Sexual activity: Not on file   Lifestyle     Physical activity     Days per week: Not on file     Minutes per session: Not on file     Stress: To some extent   Relationships     Social connections     Talks on phone: Not on file     Gets together: Not on file     Attends Tenriism service: Not on file     Active member of club or organization: Not on file     Attends meetings of clubs or organizations: Not on file     Relationship status: Not on file     Intimate partner violence     Fear of current or ex partner: Not on file     Emotionally abused: Not on file     Physically abused: Not on file     Forced sexual activity: Not on file   Other Topics Concern     Not on file   Social History Narrative    The patient lives in a foster home.        REVIEW OF SYSTEMS    Constitutional:  No reported fever, chills, weight loss, weakness, loss of appetite,   Eyes:  No reported pain, diplopia, vision loss,   HENT:  No reported sore throat, ear pain, dysphagia, Positive for choking (resolved)  Respiratory: No reported wheeze, cough,  hemoptysis  Cardiovascular:  No reported CP, RAMON, syncope Positive for palpitations (resolved)  GI:  No reported  abdominal pain, nausea, vomiting, dark or bloody stools, diarrhea   Musculoskeletal:  No reported  new muscle/joint pain, swelling or loss of function.   Skin:  No reported rash   Neurologic:  No reported headache, focal weakness,  sensory changes, vertigo, seizure  Lymphatic:  No reported swollen glands     All other systems negative unless noted in HPI.    PHYSICAL EXAM    VITAL SIGNS: /83 (Patient Position: Sitting)   Pulse 80   Temp 99.1  F (37.3  C) (Temporal)   Resp 18   Ht 5' 1\" (1.549 m)   Wt 169 lb 14.4 oz " (77.1 kg)   LMP  (LMP Unknown)   SpO2 99%   BMI 32.10 kg/m    General Appearance: Alert, cooperative, normal speech and facial symmetry,  appears stated age, speaks in full sentences  Head:  Normocephalic, without obvious abnormality, atraumatic  Eyes:   conjunctiva/corneas clear,   ENT:  Lips, mucosa, and tongue normal; teeth and gums normal, no pharyngeal inflammation, no dysphonia or difficulty swallowing, membranes are moist without pallor  Neck:  Supple, symmetrical, trachea midline, no adenopathy;        thyroid:  No enlargement/tenderness/nodules; no stridor,  no soft tissue swelling or midline CSP tenderness  Chest:  No tenderness or deformity,   Cardio:  Regular rate and rhythm, S1 and S2 normal, no murmur,  2+ pulses symmetric in all extremities  Pulm:  Clear to auscultation bilaterally, respirations unlabored,   Back: no CVA tenderness, no CTL spinal tenderness  Abdomen:  Soft, non-tender, non distended, bowel sounds active all four quadrants,     no masses, no organomegaly, no rebound or guarding, no peritoneal signs  Extremities:  Extremities normal, there is no tenderness to palpation, atraumatic, no cyanosis or edema, full function and range of motion, pulses equal in all extremities, normal cap refill, no joint swelling  Skin:  no rashes or lesions on exposed skin  Neuro:  Awake, alert, responsive to voice, follows commands, normal speech, No gross motor weakness or sensory loss, moves all extremities,     LABS  Pertinent lab results reviewed in chart.  No results found for this visit on 05/20/21.      ED MEDS  Medications   sod bicarb-citric acid-simethicone packet 1 packet (E-Z GAS) (has no administration in time range)         ED COURSE:  3:52 PM The patient was interviewed and examined.  PPE worn: surgical mask. History in the chart was reviewed.          FINAL DIAGNOSIS:   1. Mild asthma with exacerbation, unspecified whether persistent    2. Choking, initial encounter          IEllyn  Abdoulaye, am serving as a scribe to document services personally performed by Douglas Kwong MD, based on my observations and the provider's statements to me.  I, Douglas Kwong MD, attest that Ellyn Abdoulaye is acting in a scribe capacity, has observed my performance of the services and has documented them in accordance with my direction.            Douglas Kwong M.D.   Emergency Medicine  [facility] EMERGENCY DEPARTMENT  [address]  [facility] [address]  Dept: [phone]  Loc: [phone]         Douglas Kwong MD  05/20/21 [id number]

## 2021-06-17 NOTE — ED NOTES
Patient back to room, e-z gas not given in triage and new MD signed up for patient. Will wait for MD to see patient before giving.

## 2021-06-21 NOTE — LETTER
Letter by Lara Mcclelland BSW at      Author: Lara Mcclelland BSW Service: -- Author Type: --    Filed:  Encounter Date: 12/31/2020 Status: (Other)       CARE COORDINATION    December 31, 2020    Michelle Person  [address]  [place] [address]      Dear Michelle,  Your Care Team congratulates you on your journey to maintain wellness. This document will help guide you on your journey to maintain a healthy lifestyle.  You can use this to help you overcome any barriers you may encounter.  If you should have any questions or concerns, you can contact the members of your Care Team or contact your Primary Care Clinic for assistance.    Health Maintenance  Health Maintenance Reviewed:      My Access Plan  Medical Emergency 911   Primary Clinic Line Radha Torres MD - [phone]   24 Hour Appointment Line [phone] or  [phone] ([phone]) (toll-free)   24 Hour Nurse Line [phone]   Preferred Urgent Care     Preferred Hospital     Preferred Pharmacy [facility] [place] [facility] [place] - [address] [place] [address]     Behavioral Health Crisis Line The National Suicide Prevention Lifeline at 1-968.429.6170 or 911     My Care Team Members  Patient Care Team       Relationship Specialty Notifications Start End    Radha Torres MD PCP - General Family Medicine  9/17/15     Phone: [phone] Fax: [phone]         [facility] [address] [address]              Goals     ? COMPLETED: Psychosocial (pt-stated)      Goal Statement:  I want to stay out of the hospital for the next 90 days.  Date Goal set:  9/8/2020  Barriers:  Recent hospitalization(s), Cognitive delay  Strengths:  Able to identify and advocate for needs, good support system from family  Date to Achieve By:  11/26/20 (based on hospital discharge date of 8/28/20)  Patient expressed understanding of goal:  Yes  Action steps to achieve this goal:  1. I will take my medications as prescribed  2. I will attend all my appts as  scheduled, and recommended follow up appts  3. Staff will call my clinic if I start to feel sick or notice any change(s) in my health, and schedule an appt if needed  4. Staff will call the triage nurse line for advice, before going to the hospital  5. Staff will take me to   or Walk-In-Clinic before going to the hospital, if I am unable to get an appt with my PCP  6. Staff will update Care Coordination team during outreach calls and ask for additional support or resources, if needed        ? COMPLETED: Psychosocial #2 (pt-stated)      Goal Statement: I would like to be able to start my day program at [facility] in the next two months.  Date Goal set: 9/8/20  Barriers: Medical condition/Covid  Strengths: Supportive  staff  Date to Achieve By: Two months  Patient expressed understanding of goal: NA  Action steps to achieve this goal:  1. I will attend my annual review where starting my day program will be discussed(In progress, plans to start next Monday)  2. My , Amanda, will inform CHW if additional resources or support is needed.    Updated 10/6 ZOLTAN               It has been your Clinic Care Team's pleasure to work with you on your goals.    Regards,  Your Clinic Care Team

## 2021-06-22 NOTE — ANESTHESIA POSTPROCEDURE EVALUATION
Patient: Michelle Person  EXAM UNDER ANESTHESIA,  DRAINAGE OF ABCESS, PAPANICOLAOU SMEAR  Anesthesia type: MAC    Patient location: PACU  Last vitals:   Vitals:    12/13/18 1110   BP: 118/85   Pulse: (!) 101   Resp:    Temp: 36.8  C (98.2  F)   SpO2: 100%     Post vital signs: stable  Level of consciousness: awake and responds to simple questions  Post-anesthesia pain: pain controlled  Post-anesthesia nausea and vomiting: no  Pulmonary: unassisted, return to baseline  Cardiovascular: stable and blood pressure at baseline  Hydration: adequate  Anesthetic events: no    QCDR Measures:  ASA# 11 - Jennifer-op Cardiac Arrest: ASA11B - Patient did NOT experience unanticipated cardiac arrest  ASA# 12 - Jennifer-op Mortality Rate: ASA12B - Patient did NOT die  ASA# 13 - PACU Re-Intubation Rate: ASA13B - Patient did NOT require a new airway mgmt  ASA# 10 - Composite Anes Safety: ASA10A - No serious adverse event    Additional Notes:

## 2021-06-22 NOTE — ANESTHESIA PREPROCEDURE EVALUATION
"Anesthesia Evaluation        Airway   Mallampati: II  Neck ROM: full   Pulmonary     breath sounds clear to auscultation  (+) asthma  sleep apnea on no CPAP, , a smoker (former)  (-) COPD, shortness of breath, recent URI, rhonchi, wheezes, rales, stridor                         Cardiovascular   Exercise tolerance: > or = 4 METS  (-) hypertension, past MI, CAD, murmur  Rhythm: regular  Rate: normal,    no murmur      Neuro/Psych    (+) seizures, depression, anxiety/panic attacks,     Comments: Cerebral palsy  PTSD  Developmental delay  Psychosis  Suicidal ideation    Endo/Other    (+) obesity (BMI 34.61),   (-) not pregnant     GI/Hepatic/Renal    (-) GERD, impaired hepatic function, renal disease     Other findings: 29 y/o female w/ PMHx cerebral palsy, intellectual delay, PTSD, CONNIE, morbid obesity and unable to complete her physical in office w/ valium for sedation.    Discussion with her guardian and it is questionable as to whether she has true seizures and suspect it may be a history of \"pseudoseizures\". She has not had any episodes for 2+ years at her current group home and she is consistent with her divalproex dosing.    Labs  Today: UPT negative    12/11/18:  WBC 7.2, Hgb 13.2, Plt 228   Pleasant, Alert and oriented x3      Dental - normal exam     Comment: No loose, chipped, partial, removable or dentures.                         Anesthesia Plan  Planned anesthetic: MAC  Plan for propofol gtt w/ ketamine / fentanyl PRN for pain.  Discussed potential need to convert to GA w/ ETT vs LMA if not tolerating.  Explained that it is possible to remember certain aspects of the OR experience during MAC dependent on the depth of sedation and patient understands this.  Dexamethasone and zofran for PONV ppx.  ASA 3     Anesthetic plan and risks discussed with: legal guardian and patient  Anesthesia plan special considerations: antiemetics,   Post-op plan: routine recovery          "

## 2021-06-22 NOTE — ANESTHESIA CARE TRANSFER NOTE
Last vitals:   Vitals:    12/13/18 1110   BP: 118/85   Pulse: (!) 101   Resp:    Temp: 36.8  C (98.2  F)   SpO2: 100%     Patient's level of consciousness is drowsy  Spontaneous respirations: yes  Maintains airway independently: yes  Dentition unchanged: yes  Oropharynx: oropharynx clear of all foreign objects    QCDR Measures:  ASA# 20 - Surgical Safety Checklist: WHO surgical safety checklist completed prior to induction    PQRS# 430 - Adult PONV Prevention: 4558F - Pt received => 2 anti-emetic agents (different classes) preop & intraop  ASA# 8 - Peds PONV Prevention: NA - Not pediatric patient, not GA or 2 or more risk factors NOT present  PQRS# 424 - Jennifer-op Temp Management: 4559F - At least one body temp DOCUMENTED => 35.5C or 95.9F within required timeframe  PQRS# 426 - PACU Transfer Protocol: - Transfer of care checklist used  ASA# 14 - Acute Post-op Pain: ASA14B - Patient did NOT experience pain >= 7 out of 10

## 2021-06-25 ENCOUNTER — RECORDS - HEALTHEAST (OUTPATIENT)
Dept: ADMINISTRATIVE | Facility: OTHER | Age: 31
End: 2021-06-25

## 2021-06-25 ENCOUNTER — AMBULATORY - HEALTHEAST (OUTPATIENT)
Dept: LAB | Facility: HOSPITAL | Age: 31
End: 2021-06-25

## 2021-06-25 DIAGNOSIS — Z79.899 NEED FOR PROPHYLACTIC CHEMOTHERAPY: ICD-10-CM

## 2021-06-25 LAB
ALBUMIN SERPL-MCNC: 3.6 G/DL (ref 3.5–5)
ALP SERPL-CCNC: 49 U/L (ref 45–120)
ALT SERPL W P-5'-P-CCNC: 12 U/L (ref 0–45)
AMYLASE SERPL-CCNC: 64 U/L (ref 5–120)
AST SERPL W P-5'-P-CCNC: 14 U/L (ref 0–40)
BASOPHILS # BLD AUTO: 0 THOU/UL (ref 0–0.2)
BASOPHILS NFR BLD AUTO: 0 % (ref 0–2)
BILIRUB DIRECT SERPL-MCNC: 0.1 MG/DL
BILIRUB SERPL-MCNC: 0.3 MG/DL (ref 0–1)
EOSINOPHIL # BLD AUTO: 0.2 THOU/UL (ref 0–0.4)
EOSINOPHIL NFR BLD AUTO: 2 % (ref 0–6)
ERYTHROCYTE [DISTWIDTH] IN BLOOD BY AUTOMATED COUNT: 14 % (ref 11–14.5)
GGT SERPL-CCNC: 24 U/L (ref 0–50)
HCT VFR BLD AUTO: 40 % (ref 35–47)
HGB BLD-MCNC: 12.9 G/DL (ref 12–16)
IMM GRANULOCYTES # BLD: 0 THOU/UL
IMM GRANULOCYTES NFR BLD: 0 %
LYMPHOCYTES # BLD AUTO: 3 THOU/UL (ref 0.8–4.4)
LYMPHOCYTES NFR BLD AUTO: 38 % (ref 20–40)
MCH RBC QN AUTO: 27.6 PG (ref 27–34)
MCHC RBC AUTO-ENTMCNC: 32.3 G/DL (ref 32–36)
MCV RBC AUTO: 86 FL (ref 80–100)
MONOCYTES # BLD AUTO: 0.6 THOU/UL (ref 0–0.9)
MONOCYTES NFR BLD AUTO: 7 % (ref 2–10)
NEUTROPHILS # BLD AUTO: 4.1 THOU/UL (ref 2–7.7)
NEUTROPHILS NFR BLD AUTO: 52 % (ref 50–70)
PLATELET # BLD AUTO: 247 THOU/UL (ref 140–440)
PMV BLD AUTO: 8.9 FL (ref 8.5–12.5)
PROT SERPL-MCNC: 6.6 G/DL (ref 6–8)
RBC # BLD AUTO: 4.67 MILL/UL (ref 3.8–5.4)
VALPROATE SERPL-MCNC: 77.2 UG/ML (ref 50–150)
WBC: 7.9 THOU/UL (ref 4–11)

## 2021-06-29 NOTE — PROGRESS NOTES
Progress Notes by Lara Mcclelland BSW at 9/8/2020 10:00 AM     Author: Lara Mcclelland BSW Service: -- Author Type:     Filed: 9/8/2020 11:06 AM Encounter Date: 9/8/2020 Status: Signed    : Lara Mcclelland BSW ()       Clinic Care Coordination Contact    Clinic Care Coordination Contact  OUTREACH    Referral Information:  Referral Source: IP Handoff(BPCI-A)   Hospital Discharge: 8/28/20    Contact for updates: () Amanda ph:[phone]    Background:  Michelle Person is a 30 y.o. female with developmental delay, history of seizure disorder, mild persistent asthma, mild obstructive sleep apnea (AHI 6.4 in 2018) who presented from her group home to [facility] ED on 8/22/2020 with cough and feeling poorly, admitted to the ICU for acute respiratory failure on HFNC with new onset pneumonia and acute kidney injury.      Assessment:   SW called and completed initial BPCI-A assessment with  Amanda.  Amanda shares that patient is doing much better since she discharged from the hospital and had a good follow-up appointment with her PCP. Amanda reports that they are trying to make sure she sits upright after eating.   Amanda shares that patient was supposed to start day program at [facility] before hospitalization, but this has been put on hold now until patient's annual review is completed in the next month.   Amanda reports that patient has had some behaviors in the last few days, but that this pretty typical for her at this time of year as well as most residents having a harder time with being cooped up due to Covid.  Patient does attend virtual therapy which is helpful for her.    Amanda shares that we can contact her at anytime for updates. CC MARQUIS also asks that she reach out if they need assistance.       Chief Complaint   Patient presents with   ? Clinic Care Coordination - Initial     BPCI-A        [place] Utilization:   Clinic  Utilization  Difficulty keeping appointments:: No  Compliance Concerns: No  No-Show Concerns: No  Utilization    Last refreshed: 9/8/2020 10:29 AM:  Hospital Admissions 1           Last refreshed: 9/8/2020 10:29 AM:  ED Visits 1           Last refreshed: 9/8/2020 10:29 AM:  No Show Count (past year) 1              Current as of: 9/8/2020 10:29 AM              Clinical Concerns:  Current Medical Concerns:  Pneumonia, Acute respiratory failure with  Current Behavioral Concerns: Depression  Education Provided to patient: BPCI-A program explained.   Pain  Pain (GOAL):: No  Health Maintenance Reviewed: Up to date  Clinical Pathway: Clinic Care Coordination Pneumonia Assessment  Discharge:    Hospital summary: Patient was hospitalized from 8/22 to 8/28 for:  Acute respiratory failure with hypoxia (H)    Acute kidney injury (H)    Sepsis with acute hypoxic respiratory failure without septic shock, due to unspecified organism (H)    Seizure disorder (H)    Acute chest pain    Day of hospital discharge: 8/28/20    What recommendations were made for follow up after your recent  hospitalization? An informational handout was provided to the patient which included instructions that addressed activity level, diet, discharge medications, follow-up appointments, weight monitoring and what to do if symptoms worsen    Have the follow up appointments been scheduled? Yes    If not, can I help you set up these appointments? N/A         Is there a referral/need for Pulmonary Rehab? No    Is the patient enrolled in [facility] Tele-Assurance program? No    Symptom Review:    How have you been feeling now that you are home?  Group Home staff share that she is feeling good.    Are you having any increased shortness of breath? No    Are you having any fevers? No      Medications:     Were you started on any new medications?  Yes, amoxicillin-clavulanate 875-125 mg per tablet    Were any of your previous medications changed? No    Do you have all  of your medications? Yes    Have you had trouble filling your prescriptions? No    Are your medications effective in controlling your symptoms? Yes  Medication Management:  Managed by Group Home    Functional Status:  Dependent ADLs:: Independent  Dependent IADLs:: Cleaning, Cooking, Laundry, Shopping, Money Management, Medication Management, Meal Preparation, Transportation  Bed or wheelchair confined:: No  Mobility Status: Independent  Fallen 2 or more times in the past year?: No  Any fall with injury in the past year?: No    Living Situation:  Current living arrangement:: ([facility])  Type of residence:: [facility]    Lifestyle & Psychosocial Needs:  Lifestyle   ? Physical activity     Days per week: Not on file     Minutes per session: Not on file   ? Stress: To some extent     Social Needs   ? Financial resource strain: Not very hard   ? Food insecurity     Worry: Never true     Inability: Never true   ? Transportation needs     Medical: No     Non-medical: No     Inadequate nutrition (GOAL):: No  Tube Feeding: No  Inadequate activity/exercise (GOAL):: No  Significant changes in sleep pattern (GOAL): No  Transportation means:: Other([facility] provides transportation)     Quaker or spiritual beliefs that impact treatment:: No  Mental health DX:: No(History of Major Depression w/Psychosis)  Mental health management concern (GOAL):: No  Informal Support system:: Family, Other(Mom and Dad. Friends at [facility])   Socioeconomic History   ? Marital status: Single     Spouse name: Not on file   ? Number of children: Not on file   ? Years of education: Not on file   ? Highest education level: Not on file     Tobacco Use   ? Smoking status: Former Smoker   ? Smokeless tobacco: Never Used   Substance and Sexual Activity   ? Alcohol use: No   ? Drug use: No       Resources and Interventions:  Current Resources:   -BPCI-A program explained to Amanda.  -Offered assistance with  referrals or resources if needed.     Community Resources: Group Home([facility])  Supplies used at home:: None  Equipment Currently Used at Home: grab bar, toilet, grab bar, tub/shower    Advance Care Plan/Directive  Advanced Care Plans/Directives on file:: No          Goals:   Goals        General    Psychosocial (pt-stated)     Notes - Note edited  9/8/2020 10:41 AM by Lara Mcclelland BSW    Goal Statement:  I want to stay out of the hospital for the next 90 days.  Date Goal set:  9/8/2020  Barriers:  Recent hospitalization(s), Cognitive delay  Strengths:  Able to identify and advocate for needs, good support system from family  Date to Achieve By:  11/26/20 (based on hospital discharge date of 8/28/20)  Patient expressed understanding of goal:  Yes  Action steps to achieve this goal:  1. I will take my medications as prescribed  2. I will attend all my appts as scheduled, and recommended follow up appts  3. Staff will call my clinic if I start to feel sick or notice any change(s) in my health, and schedule an appt if needed  4. Staff will call the triage nurse line for advice, before going to the hospital  5. Staff will take me to  [facility] or Walk-In-Clinic before going to the hospital, if I am unable to get an appt with my PCP  6. Staff will update Care Coordination team during outreach calls and ask for additional support or resources, if needed        Psychosocial #2 (pt-stated)     Notes - Note created  9/8/2020 10:40 AM by Lara Mcclelland BSW    Goal Statement: I would like to be able to start my day program at [facility] in the next two months.  Date Goal set: 9/8/20  Barriers: Medical condition/Covid  Strengths: Supportive  staff  Date to Achieve By: Two months  Patient expressed understanding of goal: NA  Action steps to achieve this goal:  1. I will attend my annual review where starting my day program will be discussed  2. My , Amanda, will inform CHW if additional resources or  support is needed.            Patient/Caregiver understanding: Yes           Plan:  will follow up again in two weeks per BPCI-A workflow standards.

## 2021-07-11 ENCOUNTER — HOSPITAL ENCOUNTER (EMERGENCY)
Facility: HOSPITAL | Age: 31
Discharge: HOME | End: 2021-07-12
Attending: EMERGENCY MEDICINE | Admitting: EMERGENCY MEDICINE
Payer: MEDICARE

## 2021-07-11 DIAGNOSIS — R60.0 PERIPHERAL EDEMA: ICD-10-CM

## 2021-07-11 PROCEDURE — 93005 ELECTROCARDIOGRAM TRACING: CPT

## 2021-07-11 PROCEDURE — 99285 EMERGENCY DEPT VISIT HI MDM: CPT | Mod: 25

## 2021-07-11 ASSESSMENT — MIFFLIN-ST. JEOR: SCORE: 1481.99

## 2021-07-12 ENCOUNTER — HOSPITAL ENCOUNTER (EMERGENCY)
Dept: ULTRASOUND IMAGING | Facility: HOSPITAL | Age: 31
End: 2021-07-12
Attending: EMERGENCY MEDICINE
Payer: MEDICARE

## 2021-07-12 VITALS
TEMPERATURE: 98.1 F | WEIGHT: 170 LBS | HEART RATE: 63 BPM | DIASTOLIC BLOOD PRESSURE: 75 MMHG | HEIGHT: 65 IN | RESPIRATION RATE: 14 BRPM | OXYGEN SATURATION: 99 % | BODY MASS INDEX: 28.32 KG/M2 | SYSTOLIC BLOOD PRESSURE: 112 MMHG

## 2021-07-12 LAB
ALBUMIN UR-MCNC: NEGATIVE MG/DL
ANION GAP SERPL CALCULATED.3IONS-SCNC: 9 MMOL/L (ref 5–18)
APPEARANCE UR: CLEAR
BILIRUB UR QL STRIP: NEGATIVE
BUN SERPL-MCNC: 15 MG/DL (ref 8–22)
CALCIUM SERPL-MCNC: 8.8 MG/DL (ref 8.5–10.5)
CHLORIDE BLD-SCNC: 103 MMOL/L (ref 98–107)
CO2 SERPL-SCNC: 26 MMOL/L (ref 22–31)
COLOR UR AUTO: COLORLESS
CREAT SERPL-MCNC: 1.14 MG/DL (ref 0.6–1.1)
ERYTHROCYTE [DISTWIDTH] IN BLOOD BY AUTOMATED COUNT: 13.5 % (ref 10–15)
GFR SERPL CREATININE-BSD FRML MDRD: 64 ML/MIN/1.73M2
GLUCOSE BLD-MCNC: 83 MG/DL (ref 70–125)
GLUCOSE UR STRIP-MCNC: NEGATIVE MG/DL
HCT VFR BLD AUTO: 39.9 % (ref 35–47)
HGB BLD-MCNC: 13 G/DL (ref 11.7–15.7)
HGB UR QL STRIP: NEGATIVE
HOLD SPECIMEN: NORMAL
KETONES UR STRIP-MCNC: NEGATIVE MG/DL
LEUKOCYTE ESTERASE UR QL STRIP: NEGATIVE
MCH RBC QN AUTO: 27.7 PG (ref 26.5–33)
MCHC RBC AUTO-ENTMCNC: 32.6 G/DL (ref 31.5–36.5)
MCV RBC AUTO: 85 FL (ref 78–100)
NITRATE UR QL: NEGATIVE
NT-PROBNP SERPL-MCNC: 57 PG/ML
PH UR STRIP: 7 [PH] (ref 5–7)
PLATELET # BLD AUTO: 207 10E3/UL (ref 150–450)
POTASSIUM BLD-SCNC: 4.2 MMOL/L (ref 3.5–5)
RBC # BLD AUTO: 4.7 10E6/UL (ref 3.8–5.2)
SODIUM SERPL-SCNC: 138 MMOL/L (ref 136–145)
SP GR UR STRIP: 1.01 (ref 1–1.03)
UROBILINOGEN UR STRIP-MCNC: <2 MG/DL
WBC # BLD AUTO: 8.4 10E3/UL (ref 4–11)

## 2021-07-12 PROCEDURE — 93970 EXTREMITY STUDY: CPT

## 2021-07-12 PROCEDURE — 36592 COLLECT BLOOD FROM PICC: CPT | Performed by: EMERGENCY MEDICINE

## 2021-07-12 PROCEDURE — 81003 URINALYSIS AUTO W/O SCOPE: CPT | Performed by: EMERGENCY MEDICINE

## 2021-07-12 PROCEDURE — 83880 ASSAY OF NATRIURETIC PEPTIDE: CPT | Performed by: EMERGENCY MEDICINE

## 2021-07-12 PROCEDURE — 85027 COMPLETE CBC AUTOMATED: CPT | Performed by: EMERGENCY MEDICINE

## 2021-07-12 PROCEDURE — 80048 BASIC METABOLIC PNL TOTAL CA: CPT | Performed by: EMERGENCY MEDICINE

## 2021-07-12 PROCEDURE — 36415 COLL VENOUS BLD VENIPUNCTURE: CPT | Performed by: EMERGENCY MEDICINE

## 2021-07-12 NOTE — ED PROVIDER NOTES
EMERGENCY DEPARTMENT ENCOUNTER      FINAL IMPRESSION    1. Peripheral edema        MEDICAL DECISION MAKING    31-year-old female presented to the ED with bilateral leg swelling and pain.  Nursing of vital signs reviewed and arrival are grossly reassuring peer examination with nonpitting edema bilateral lower extremities.  No symptoms of PE of concern.  No symptoms of fluid overload from a pulmonary congestion standpoint.  Etiology concerning for venous incompetence versus occult DVT.  Do not suspect cellulitis based on exam.  Vitals otherwise are grossly reassuring.     IV established lab work is sent is noted reassuring without significant creatinine elevation nor proteinuria nor BNP elevation     Patient sent for duplex imaging showing no DVT but a Baker's cyst noted..     Ultimately through ED course patient remained stable.  I believe safe for discharge will advise compression stockings and elevation.     At the conclusion of the encounter I discussed the results of all of the tests and the disposition. All questions were answered. The patient and or family acknowledged understanding and was involved in the decision making regarding the overall care plan. I discussed the utility, limitations and findings of the exam/interventions/studies done during this visit as well as the list of differential diagnosis and symptoms for which to monitor/return to ED. Verbalizes understanding.      MEDICATIONS GIVEN IN THE EMERGENCY:  Medications - No data to display    NEW PRESCRIPTIONS STARTED AT TODAY'S ER VISIT     Review of your medicines      UNREVIEWED medicines. Ask your doctor about these medicines      Dose / Directions   albuterol 108 (90 Base) MCG/ACT inhaler  Commonly known as: ProAir HFA  Used for: Uncomplicated asthma, unspecified asthma severity, unspecified whether persistent      Dose: 2 puff  Inhale 2 puffs into the lungs daily as needed for wheezing  Quantity: 1 Inhaler  Refills: 0     ARIPiprazole 30 MG  tablet  Commonly known as: ABILIFY  Used for: Psychosis, affective (H)      Dose: 30 mg  Take 1 tablet (30 mg) by mouth daily  Quantity: 30 tablet  Refills: 0     divalproex sodium delayed-release 500 MG DR tablet  Commonly known as: DEPAKOTE  Used for: Seizure disorder (H)      Dose: 2,000 mg  Take 4 tablets (2,000 mg) by mouth At Bedtime  Quantity: 120 tablet  Refills: 0     docusate sodium 100 MG capsule  Commonly known as: COLACE  Used for: Drug-induced constipation      Dose: 100 mg  Take 1 capsule (100 mg) by mouth daily  Quantity: 30 capsule  Refills: 0     fluticasone 100 MCG/ACT inhaler  Commonly known as: ARNUITY ELLIPTA  Used for: Uncomplicated asthma, unspecified asthma severity, unspecified whether persistent      Dose: 1 puff  Inhale 1 puff into the lungs daily  Quantity: 1 each  Refills: 0     fluticasone 100 MCG/BLIST inhaler  Commonly known as: FLOVENT DISKUS  Used for: Uncomplicated asthma, unspecified asthma severity, unspecified whether persistent      Dose: 1 puff  Inhale 1 puff into the lungs every 12 hours  Quantity: 1 Inhaler  Refills: 0     ketoconazole 2 % external cream  Commonly known as: NIZORAL      Dose: 1 Application  Apply 1 Application topically 2 times daily as needed Apply to skin fold  Refills: 0     medroxyPROGESTERone 150 MG/ML IM injection  Commonly known as: DEPO-PROVERA  Used for: Encounter for contraceptive management, unspecified type      Dose: 150 mg  Inject 1 mL (150 mg) into the muscle every 3 months Last injection was on 12/10/20 per group home  Quantity: 1 mL  Refills: 0     montelukast 10 MG tablet  Commonly known as: SINGULAIR  Used for: Uncomplicated asthma, unspecified asthma severity, unspecified whether persistent      Dose: 10 mg  Take 1 tablet (10 mg) by mouth At Bedtime  Quantity: 30 tablet  Refills: 0     OLANZapine 10 MG tablet  Commonly known as: zyPREXA  Used for: Depression, major, recurrent, severe with psychosis (H), Psychosis, affective (H), Suicidal  ideation      Dose: 10 mg  Take 1 tablet (10 mg) by mouth 3 times daily as needed (associated with psychosis or magdalene)  Quantity: 30 tablet  Refills: 0     oxybutynin ER 15 MG 24 hr tablet  Commonly known as: DITROPAN XL  Used for: Overactive bladder      Dose: 15 mg  Take 1 tablet (15 mg) by mouth daily  Quantity: 30 tablet  Refills: 0     pantoprazole 40 MG EC tablet  Commonly known as: PROTONIX  Used for: Peptic ulcer      Dose: 40 mg  Take 1 tablet (40 mg) by mouth daily  Quantity: 30 tablet  Refills: 0     polyethylene glycol 17 g packet  Commonly known as: MIRALAX  Used for: Drug-induced constipation      Dose: 17 g  Take 17 g by mouth daily as needed for constipation  Quantity: 30 packet  Refills: 0     SENNA-docusate sodium 8.6-50 MG tablet  Commonly known as: SENNA S  Used for: Drug-induced constipation      Dose: 1 tablet  Take 1 tablet by mouth At Bedtime  Quantity: 30 tablet  Refills: 0     sennosides 8.6 MG tablet  Commonly known as: SENOKOT  Used for: Drug-induced constipation      Dose: 1 tablet  Take 1 tablet by mouth 2 times daily as needed for constipation  Quantity: 30 tablet  Refills: 0     sertraline 100 MG tablet  Commonly known as: ZOLOFT  Used for: Depression, major, recurrent, severe with psychosis (H)      Dose: 200 mg  Take 2 tablets (200 mg) by mouth At Bedtime  Quantity: 60 tablet  Refills: 0     Vitamin D3 25 mcg (1000 units) tablet  Commonly known as: CHOLECALCIFEROL  Used for: Vitamin D deficiency      Dose: 25 mcg  Take 1 tablet (25 mcg) by mouth daily  Quantity: 30 tablet  Refills: 0        CONTINUE these medicines which have NOT CHANGED      Dose / Directions   * FISH OIL PO      Dose: 1 capsule  Take 1 capsule by mouth 3 times daily  Refills: 0     * fish oil-omega-3 fatty acids 1000 MG capsule  Used for: Vitamin D deficiency      Dose: 1 g  Take 1 capsule (1 g) by mouth 3 times daily  Quantity: 30 capsule  Refills: 0         * This list has 2 medication(s) that are the same as  other medications prescribed for you. Read the directions carefully, and ask your doctor or other care provider to review them with you.                    CHIEF COMPLAINT    Chief Complaint   Patient presents with     Bilateral leg pain         HPI    Michelle Person is a 31 year old female with pertinent past medical history for psychosis, morbid obesity, and intellectual disability who presents to this Emergency Department for evaluation of leg swelling.    Patient reports she noticed bilateral leg pain and swelling while sitting in a rocking chair today. She denies any chest pain, fever, or shortness of breath. She has not had any episodes of this in the past. She does not have any recent surgeries or travel. She denies any cardiac history. She has no other complaints at this time.     This document serves as a record of services performed by Dr. Charles Puri. It was created on his behalf by Zeyad Vang, trained medical scribe. The creation of this record is based on the scribes personal observations and the providers statements to him/her. This document has been checked and approved by the attending provider.    RELEVANT HISTORY, MEDICATIONS, & ALLERGIES   Past medical history, surgical history, family history, medications, and allergies reviewed and pertinent noted in HPI. See end of note for comprehensive list.    REVIEW OF SYSTEMS    Constitutional:  No fever or chills, no weakness  Respiratory: No shortness of breath, no cough  Cardiovascular:  No chest pain, no palpitations, no syncope.  GI:  No abdominal pain, no nausea, no vomiting, no diarrhea.   : No dysuria, No hematuria, No frequency.  Musculoskeletal:  No loss of function. Positive for bilateral leg swelling and pain.   Skin:  No rash.  Neurologic:  No headache, no focal weakness , no sensory changes    All other systems reviewed and are negative.    PHYSICAL EXAM    VITALS SIGNS: /77   Pulse 64   Temp 98.1  F (36.7  C) (Oral)   Resp  "14   Ht 1.651 m (5' 5\")   Wt 77.1 kg (170 lb)   LMP  (LMP Unknown)   SpO2 100%   Breastfeeding No   BMI 28.29 kg/m    Constitutional:  Awake, Alert, Cooperative.  HENT: Normocephalic, Atraumatic, Bilateral external ears normal, Oropharynx moist, Nose normal.   Neck: Normal range of motion, No tenderness, Supple, No meningismus, No stridor.   Eyes: PERRL, EOMI, Conjunctiva normal, No discharge.   Respiratory: Normal breath sounds, No respiratory distress, No wheezing, No chest tenderness.   Cardiovascular: Normal heart rate, Normal rhythm, No murmurs, No rubs, No gallops.   GI: Soft, No tenderness, No guarding, No CVA tenderness.   Musculoskeletal: Neurovascularly intact distally, No tenderness. 2+ nonpitting edema.  Integument: Warm, Dry, No erythema, No rash.   Neurologic: Alert & oriented x 3, Normal motor function, Normal sensory function, No focal deficits noted.     LABS  Labs Ordered and Resulted from Time of ED Arrival Up to the Time of Departure from the ED   CBC WITH PLATELETS - Normal   UA MACROSCOPIC WITH REFLEX TO MICRO AND CULTURE - Normal    Narrative:     Microscopic not indicated   BASIC METABOLIC PANEL   NT PROBNP INPATIENT         EKG    None     RADIOLOGY    Please see official radiology report.  US Lower Extremity Venous Duplex Bilateral   Final Result   IMPRESSION:   1.  No deep venous thrombosis in the bilateral lower extremities.      2.  Small right Baker's cyst.            All laboratories, imaging and EKG's were personally reviewed and interpreted by myself prior to disposition decision.     PROCEDURES    None    Comprehensive outline of [facility] chart Hx  PAST MEDICAL HISTORY    Past Medical History:   Diagnosis Date     Asthma      Depression      Morbid obesity (H)      MR (mental retardation)      Psychosis (H)      Seizures (H)      Sleep apnea     been refusing to wear cpap machine at group home     Suicidal ideation      Suicide attempt (H)      Past Surgical History:   Procedure " Laterality Date     CARDIAC SURGERY      pt reports as an infant, unable to obtain records     HC PELVIC EXAMINATION W ANESTH N/A 12/13/2018    Procedure: EXAM UNDER ANESTHESIA,  DRAINAGE OF ABCESS;  Surgeon: Caryn Ortiz MD;  Location: [facility] [place];  Service: Gynecology     PICC MIDLINE INSERTION  8/23/2020          WISDOM TOOTH EXTRACTION         CURRENT MEDICATIONS    No current facility-administered medications for this encounter.    Current Outpatient Medications:      albuterol (PROAIR HFA) 108 (90 Base) MCG/ACT inhaler, Inhale 2 puffs into the lungs daily as needed for wheezing, Disp: 1 Inhaler, Rfl: 0     ARIPiprazole (ABILIFY) 30 MG tablet, Take 1 tablet (30 mg) by mouth daily, Disp: 30 tablet, Rfl: 0     divalproex sodium delayed-release (DEPAKOTE) 500 MG DR tablet, Take 4 tablets (2,000 mg) by mouth At Bedtime, Disp: 120 tablet, Rfl: 0     docusate sodium (COLACE) 100 MG capsule, Take 1 capsule (100 mg) by mouth daily, Disp: 30 capsule, Rfl: 0     fish oil-omega-3 fatty acids 1000 MG capsule, Take 1 capsule (1 g) by mouth 3 times daily, Disp: 30 capsule, Rfl: 0     fluticasone (ARNUITY ELLIPTA) 100 MCG/ACT inhaler, Inhale 1 puff into the lungs daily, Disp: 1 each, Rfl: 0     fluticasone (FLOVENT DISKUS) 100 MCG/BLIST inhaler, Inhale 1 puff into the lungs every 12 hours, Disp: 1 Inhaler, Rfl: 0     ketoconazole (NIZORAL) 2 % external cream, Apply 1 Application topically 2 times daily as needed Apply to skin fold, Disp: , Rfl:      medroxyPROGESTERone (DEPO-PROVERA) 150 MG/ML IM injection, Inject 1 mL (150 mg) into the muscle every 3 months Last injection was on 12/10/20 per group home, Disp: 1 mL, Rfl: 0     montelukast (SINGULAIR) 10 MG tablet, Take 1 tablet (10 mg) by mouth At Bedtime, Disp: 30 tablet, Rfl: 0     OLANZapine (ZYPREXA) 10 MG tablet, Take 1 tablet (10 mg) by mouth 3 times daily as needed (associated with psychosis or magdalene), Disp: 30 tablet, Rfl: 0     Omega-3 Fatty Acids  (FISH OIL PO), Take 1 capsule by mouth 3 times daily , Disp: , Rfl:      oxybutynin ER (DITROPAN XL) 15 MG 24 hr tablet, Take 1 tablet (15 mg) by mouth daily, Disp: 30 tablet, Rfl: 0     pantoprazole (PROTONIX) 40 MG EC tablet, Take 1 tablet (40 mg) by mouth daily, Disp: 30 tablet, Rfl: 0     polyethylene glycol (MIRALAX) 17 g packet, Take 17 g by mouth daily as needed for constipation, Disp: 30 packet, Rfl: 0     SENNA-docusate sodium (SENNA S) 8.6-50 MG tablet, Take 1 tablet by mouth At Bedtime, Disp: 30 tablet, Rfl: 0     sennosides (SENOKOT) 8.6 MG tablet, Take 1 tablet by mouth 2 times daily as needed for constipation, Disp: 30 tablet, Rfl: 0     sertraline (ZOLOFT) 100 MG tablet, Take 2 tablets (200 mg) by mouth At Bedtime, Disp: 60 tablet, Rfl: 0     Vitamin D3 (CHOLECALCIFEROL) 25 mcg (1000 units) tablet, Take 1 tablet (25 mcg) by mouth daily, Disp: 30 tablet, Rfl: 0    ALLERGIES    Allergies   Allergen Reactions     Ibuprofen Other (See Comments)     Until cleared by primary MD d/t kidney function     Tomato Unknown       FAMILY HISTORY    Family History   Problem Relation Age of Onset     Hypertension Mother        SOCIAL HISTORY    Social History     Socioeconomic History     Marital status: Single     Spouse name: Not on file     Number of children: Not on file     Years of education: Not on file     Highest education level: Not on file   Occupational History     Not on file   Tobacco Use     Smoking status: Former Smoker     Smokeless tobacco: Never Used   Substance and Sexual Activity     Alcohol use: No     Drug use: No     Sexual activity: Not on file   Other Topics Concern     Not on file   Social History Narrative    The patient lives in a foster home.      Social Determinants of Health     Financial Resource Strain:      Difficulty of Paying Living Expenses:    Food Insecurity:      Worried About Running Out of Food in the Last Year:      Ran Out of Food in the Last Year:    Transportation Needs:       Lack of Transportation (Medical):      Lack of Transportation (Non-Medical):    Physical Activity:      Days of Exercise per Week:      Minutes of Exercise per Session:    Stress:      Feeling of Stress :    Social Connections:      Frequency of Communication with Friends and Family:      Frequency of Social Gatherings with Friends and Family:      Attends [employer] Services:      Active Member of Clubs or Organizations:      Attends Club or Organization Meetings:      Marital Status:    Intimate Partner Violence:      Fear of Current or Ex-Partner:      Emotionally Abused:      Physically Abused:      Sexually Abused:        This document serves as a record of services performed by Dr. Charles Puri. It was created on his behalf by Zeyad Vang, trained medical scribe. The creation of this record is based on the scribes personal observations and the providers statements to him/her.     I Dr. Charles Puri, personally performed the services described in this documentation as scribed by the above named individual in my presence, and it is both accurate and complete.       Charles Puri MD  07/12/21 [id number]

## 2021-07-12 NOTE — ED TRIAGE NOTES
Pt at group home sitting in rocking chair tonight, felt her legs were painful and swollen at lower leg.  Pt denies any injury or falls. Pt given med by staff, unknown to pt what this medication is.  No relief.

## 2021-07-12 NOTE — DISCHARGE INSTRUCTIONS
Your lab work and ultrasound today are reassuring.  I suspect this swelling may be due to poor vein valves which can be fairly common if you are up and about on your feet frequently.  When resting please elevate your legs above the heart as best you can.  You may use compressive stockings to help minimize this.  Continue follow-up with your primary doctor.    Turn to the ED if symptoms worsen or new arise

## 2021-09-20 ENCOUNTER — LAB REQUISITION (OUTPATIENT)
Dept: LAB | Facility: CLINIC | Age: 31
End: 2021-09-20
Payer: MEDICARE

## 2021-09-20 DIAGNOSIS — Z11.1 ENCOUNTER FOR SCREENING FOR RESPIRATORY TUBERCULOSIS: ICD-10-CM

## 2021-09-20 DIAGNOSIS — N17.9 ACUTE KIDNEY FAILURE, UNSPECIFIED (H): ICD-10-CM

## 2021-09-20 DIAGNOSIS — Z13.220 ENCOUNTER FOR SCREENING FOR LIPOID DISORDERS: ICD-10-CM

## 2021-09-20 LAB
ANION GAP SERPL CALCULATED.3IONS-SCNC: 11 MMOL/L (ref 5–18)
BUN SERPL-MCNC: 14 MG/DL (ref 8–22)
CALCIUM SERPL-MCNC: 9.2 MG/DL (ref 8.5–10.5)
CHLORIDE BLD-SCNC: 102 MMOL/L (ref 98–107)
CHOLEST SERPL-MCNC: 169 MG/DL
CO2 SERPL-SCNC: 25 MMOL/L (ref 22–31)
CREAT SERPL-MCNC: 1.08 MG/DL (ref 0.6–1.1)
GFR SERPL CREATININE-BSD FRML MDRD: 68 ML/MIN/1.73M2
GLUCOSE BLD-MCNC: 75 MG/DL (ref 70–125)
HDLC SERPL-MCNC: 54 MG/DL
LDLC SERPL CALC-MCNC: 96 MG/DL
POTASSIUM BLD-SCNC: 4.3 MMOL/L (ref 3.5–5)
SODIUM SERPL-SCNC: 138 MMOL/L (ref 136–145)
TRIGL SERPL-MCNC: 93 MG/DL

## 2021-09-20 PROCEDURE — 80048 BASIC METABOLIC PNL TOTAL CA: CPT | Mod: ORL | Performed by: FAMILY MEDICINE

## 2021-09-20 PROCEDURE — 80061 LIPID PANEL: CPT | Mod: ORL | Performed by: FAMILY MEDICINE

## 2021-09-27 PROCEDURE — 86481 TB AG RESPONSE T-CELL SUSP: CPT | Mod: ORL | Performed by: FAMILY MEDICINE

## 2021-09-27 PROCEDURE — 36415 COLL VENOUS BLD VENIPUNCTURE: CPT | Mod: ORL | Performed by: FAMILY MEDICINE

## 2021-09-28 LAB
GAMMA INTERFERON BACKGROUND BLD IA-ACNC: 0.09 IU/ML
M TB IFN-G BLD-IMP: NEGATIVE
M TB IFN-G CD4+ BCKGRND COR BLD-ACNC: 9.91 IU/ML
MITOGEN IGNF BCKGRD COR BLD-ACNC: 0.02 IU/ML
MITOGEN IGNF BCKGRD COR BLD-ACNC: 0.03 IU/ML
QUANTIFERON MITOGEN: 10 IU/ML
QUANTIFERON NIL TUBE: 0.09 IU/ML
QUANTIFERON TB1 TUBE: 0.12 IU/ML
QUANTIFERON TB2 TUBE: 0.11

## 2021-11-08 ENCOUNTER — LAB REQUISITION (OUTPATIENT)
Dept: LAB | Facility: CLINIC | Age: 31
End: 2021-11-08
Payer: MEDICARE

## 2021-11-08 DIAGNOSIS — Z20.822 CONTACT WITH AND (SUSPECTED) EXPOSURE TO COVID-19: ICD-10-CM

## 2021-11-08 PROCEDURE — U0003 INFECTIOUS AGENT DETECTION BY NUCLEIC ACID (DNA OR RNA); SEVERE ACUTE RESPIRATORY SYNDROME CORONAVIRUS 2 (SARS-COV-2) (CORONAVIRUS DISEASE [COVID-19]), AMPLIFIED PROBE TECHNIQUE, MAKING USE OF HIGH THROUGHPUT TECHNOLOGIES AS DESCRIBED BY CMS-2020-01-R: HCPCS | Mod: ORL | Performed by: FAMILY MEDICINE

## 2021-11-09 LAB — SARS-COV-2 RNA RESP QL NAA+PROBE: NEGATIVE

## 2021-12-20 ENCOUNTER — HOSPITAL ENCOUNTER (EMERGENCY)
Facility: HOSPITAL | Age: 31
Discharge: HOME | End: 2021-12-20
Admitting: NURSE PRACTITIONER
Payer: MEDICARE

## 2021-12-20 ENCOUNTER — APPOINTMENT (OUTPATIENT)
Dept: RADIOLOGY | Facility: HOSPITAL | Age: 31
End: 2021-12-20
Payer: MEDICARE

## 2021-12-20 VITALS
RESPIRATION RATE: 18 BRPM | DIASTOLIC BLOOD PRESSURE: 80 MMHG | TEMPERATURE: 97.8 F | BODY MASS INDEX: 28.29 KG/M2 | WEIGHT: 170 LBS | SYSTOLIC BLOOD PRESSURE: 129 MMHG | HEART RATE: 81 BPM | OXYGEN SATURATION: 98 %

## 2021-12-20 DIAGNOSIS — Z20.822 LAB TEST NEGATIVE FOR COVID-19 VIRUS: ICD-10-CM

## 2021-12-20 DIAGNOSIS — N39.0 URINARY TRACT INFECTION WITHOUT HEMATURIA, SITE UNSPECIFIED: ICD-10-CM

## 2021-12-20 DIAGNOSIS — R05.9 COUGH: ICD-10-CM

## 2021-12-20 LAB
ALBUMIN UR-MCNC: NEGATIVE MG/DL
APPEARANCE UR: CLEAR
BILIRUB UR QL STRIP: NEGATIVE
COLOR UR AUTO: ABNORMAL
FLUAV RNA SPEC QL NAA+PROBE: NEGATIVE
FLUBV RNA RESP QL NAA+PROBE: NEGATIVE
GLUCOSE UR STRIP-MCNC: NEGATIVE MG/DL
HCG UR QL: NEGATIVE
HGB UR QL STRIP: NEGATIVE
INTERNAL QC OK POCT: NORMAL
KETONES UR STRIP-MCNC: NEGATIVE MG/DL
LEUKOCYTE ESTERASE UR QL STRIP: ABNORMAL
NITRATE UR QL: NEGATIVE
PH UR STRIP: 6.5 [PH] (ref 5–7)
POCT KIT EXPIRATION DATE: NORMAL
POCT KIT LOT NUMBER: NORMAL
RBC URINE: 2 /HPF
SARS-COV-2 RNA RESP QL NAA+PROBE: NEGATIVE
SP GR UR STRIP: 1.01 (ref 1–1.03)
SQUAMOUS EPITHELIAL: <1 /HPF
UROBILINOGEN UR STRIP-MCNC: <2 MG/DL
WBC URINE: 8 /HPF

## 2021-12-20 PROCEDURE — C9803 HOPD COVID-19 SPEC COLLECT: HCPCS

## 2021-12-20 PROCEDURE — 99284 EMERGENCY DEPT VISIT MOD MDM: CPT | Mod: 25

## 2021-12-20 PROCEDURE — 81025 URINE PREGNANCY TEST: CPT | Performed by: NURSE PRACTITIONER

## 2021-12-20 PROCEDURE — 87086 URINE CULTURE/COLONY COUNT: CPT | Performed by: NURSE PRACTITIONER

## 2021-12-20 PROCEDURE — 71045 X-RAY EXAM CHEST 1 VIEW: CPT

## 2021-12-20 PROCEDURE — 250N000013 HC RX MED GY IP 250 OP 250 PS 637: Performed by: NURSE PRACTITIONER

## 2021-12-20 PROCEDURE — 87636 SARSCOV2 & INF A&B AMP PRB: CPT | Performed by: NURSE PRACTITIONER

## 2021-12-20 PROCEDURE — 81001 URINALYSIS AUTO W/SCOPE: CPT | Performed by: NURSE PRACTITIONER

## 2021-12-20 RX ORDER — CEPHALEXIN 500 MG/1
500 CAPSULE ORAL ONCE
Status: COMPLETED | OUTPATIENT
Start: 2021-12-20 | End: 2021-12-20

## 2021-12-20 RX ORDER — ACETAMINOPHEN 500 MG
1000 TABLET ORAL ONCE
Status: DISCONTINUED | OUTPATIENT
Start: 2021-12-20 | End: 2021-12-20 | Stop reason: HOSPADM

## 2021-12-20 RX ORDER — CEPHALEXIN 500 MG/1
500 CAPSULE ORAL 3 TIMES DAILY
Qty: 15 CAPSULE | Refills: 0 | Status: SHIPPED | OUTPATIENT
Start: 2021-12-20 | End: 2021-12-20

## 2021-12-20 RX ORDER — CEPHALEXIN 500 MG/1
500 CAPSULE ORAL 3 TIMES DAILY
Qty: 15 CAPSULE | Refills: 0 | Status: SHIPPED | OUTPATIENT
Start: 2021-12-20 | End: 2022-01-01

## 2021-12-20 RX ADMIN — CEPHALEXIN 500 MG: 500 CAPSULE ORAL at 20:15

## 2021-12-20 ASSESSMENT — ENCOUNTER SYMPTOMS
ABDOMINAL PAIN: 1
COUGH: 1
DIARRHEA: 0
FEVER: 0
DYSURIA: 1
NAUSEA: 0
SHORTNESS OF BREATH: 1
VOMITING: 0

## 2021-12-20 NOTE — ED TRIAGE NOTES
The pt presents via [facility] for evaluation of SOB and abdominal pain. She reports feeling SOB at her day program today and took her inhaler 4 times. At some unknown time thereafter she developed abdominal pain. She is also reporting chest pain, and the inability to more her R arm/leg. She was able to ambulate without difficulty into the room, and is spontaneously moving her R arm.

## 2021-12-20 NOTE — ED NOTES
Bed: [facility]NED-10  Expected date: 12/20/21  Expected time:   Means of arrival: Ambulance  Comments:  MPWD- 32yo F Group Home SOB, using inhalers

## 2021-12-20 NOTE — ED PROVIDER NOTES
EMERGENCY DEPARTMENT ENCOUNTER      NAME: Michelle Person  AGE: 31 year old female  YOB: 1990  MRN: [id number]  EVALUATION DATE & TIME: 2021  4:42 PM    PCP: Radha Torres    ED PROVIDER: LAZARA Dobbs, CNP      Chief Complaint   Patient presents with     Shortness of Breath     Abdominal Pain         FINAL IMPRESSION:  1. Lab test negative for COVID-19 virus    2. Cough    3. Urinary tract infection without hematuria, site unspecified          ED COURSE & MEDICAL DECISION MAKIN:47 PM I met with the patient, obtained history, performed an initial exam, and discussed options and plan for treatment here in the ED.  6:41 PM I rechecked and updated the patient. She has no complaints at this time. She is waiting for her dinner from the cafeteria.  7:35 PM rechecked patient. UA collected and being sent. Patient has no complaints. States he breathing is fine. No pain. Offered a dose of steroids for possible mild asthma flare but she declined.  8:09 PM I rechecked the patient. We discussed the plan for discharge and the patient is agreeable. Reviewed supportive cares, symptomatic treatment, outpatient follow up, and reasons to return to the Emergency Department. Patient to be discharged by ED RN.     Pertinent Labs & Imaging studies reviewed. (See chart for details)  31 year old female presents to the Emergency Department for evaluation of cough and abdominal pain. covid negative. Abdominal exam was benign. She did not some dysuria and UA showed findings suspicious for UTIs especially in the setting of her dysuria. Will start on keflex. Suspect mild viral illness causing cough. She is ambulatory and overall appears well. Given follow up recommendations and return precautions.     At the conclusion of the encounter I discussed the results of all of the tests and the disposition. The questions were answered. The patient or family acknowledged understanding and was agreeable with the  care plan.       MEDICATIONS GIVEN IN THE EMERGENCY:  Medications   acetaminophen (TYLENOL) tablet 1,000 mg (has no administration in time range)   cephALEXin (KEFLEX) capsule 500 mg (500 mg Oral Given 12/20/21 2015)       NEW PRESCRIPTIONS STARTED AT TODAY'S ER VISIT  New Prescriptions    CEPHALEXIN (KEFLEX) 500 MG CAPSULE    Take 1 capsule (500 mg) by mouth 3 times daily for 5 days            =================================================================    HPI    Patient information was obtained from: patient     Use of Intrepreter: N/A      Michelle Person is a 31 year old female with a history of asthma, sepsis with acute respiratory failure with hypoxia, acute kidney injury, acute renal failure syndrome, GERD, peptic ulcer, seizure disorder who presents by EMS for evaluation of shortness of breath.    Patient developed shortness of breath yesterday after a nap at her group home. Has been using her albuterol inhaler with temporary relief. She also noticed onset of diffuse abdominal pain and dysuria yesterday. Patient has also been coughing. She endorses possible COVID19 exposure. Denies fever, chest pain, nausea, vomiting, or diarrhea.    REVIEW OF SYSTEMS   Review of Systems   Constitutional: Negative for fever.   Respiratory: Positive for cough and shortness of breath.    Cardiovascular: Negative for chest pain.   Gastrointestinal: Positive for abdominal pain. Negative for diarrhea, nausea and vomiting.   Genitourinary: Positive for dysuria.   All other systems reviewed and are negative.      PAST MEDICAL HISTORY:  Past Medical History:   Diagnosis Date     Asthma      Depression      Morbid obesity (H)      MR (mental retardation)      Psychosis (H)      Seizures (H)      Sleep apnea     been refusing to wear cpap machine at group home     Suicidal ideation      Suicide attempt (H)        PAST SURGICAL HISTORY:  Past Surgical History:   Procedure Laterality Date     CARDIAC SURGERY      pt reports as an  infant, unable to obtain records     HC PELVIC EXAMINATION W ANESTH N/A 12/13/2018    Procedure: EXAM UNDER ANESTHESIA,  DRAINAGE OF ABCESS;  Surgeon: Caryn Ortiz MD;  [facility];  Service: Gynecology     PICC MIDLINE INSERTION  8/23/2020          WISDOM TOOTH EXTRACTION             CURRENT MEDICATIONS:    Prior to Admission Medications   Prescriptions Last Dose Informant Patient Reported? Taking?   ARIPiprazole (ABILIFY) 30 MG tablet   No No   Sig: Take 1 tablet (30 mg) by mouth daily   OLANZapine (ZYPREXA) 10 MG tablet   No No   Sig: Take 1 tablet (10 mg) by mouth 3 times daily as needed (associated with psychosis or magdalene)   Omega-3 Fatty Acids (FISH OIL PO)   Yes No   Sig: Take 1 capsule by mouth 3 times daily    SENNA-docusate sodium (SENNA S) 8.6-50 MG tablet   No No   Sig: Take 1 tablet by mouth At Bedtime   Vitamin D3 (CHOLECALCIFEROL) 25 mcg (1000 units) tablet   No No   Sig: Take 1 tablet (25 mcg) by mouth daily   albuterol (PROAIR HFA) 108 (90 Base) MCG/ACT inhaler   No No   Sig: Inhale 2 puffs into the lungs daily as needed for wheezing   divalproex sodium delayed-release (DEPAKOTE) 500 MG DR tablet   No No   Sig: Take 4 tablets (2,000 mg) by mouth At Bedtime   docusate sodium (COLACE) 100 MG capsule   No No   Sig: Take 1 capsule (100 mg) by mouth daily   fish oil-omega-3 fatty acids 1000 MG capsule   No No   Sig: Take 1 capsule (1 g) by mouth 3 times daily   fluticasone (ARNUITY ELLIPTA) 100 MCG/ACT inhaler   No No   Sig: Inhale 1 puff into the lungs daily   fluticasone (FLOVENT DISKUS) 100 MCG/BLIST inhaler   No No   Sig: Inhale 1 puff into the lungs every 12 hours   ketoconazole (NIZORAL) 2 % external cream   Yes No   Sig: Apply 1 Application topically 2 times daily as needed Apply to skin fold   medroxyPROGESTERone (DEPO-PROVERA) 150 MG/ML IM injection   No No   Sig: Inject 1 mL (150 mg) into the muscle every 3 months Last injection was on 12/10/20 per group home    montelukast (SINGULAIR) 10 MG tablet   No No   Sig: Take 1 tablet (10 mg) by mouth At Bedtime   oxybutynin ER (DITROPAN XL) 15 MG 24 hr tablet   No No   Sig: Take 1 tablet (15 mg) by mouth daily   pantoprazole (PROTONIX) 40 MG EC tablet   No No   Sig: Take 1 tablet (40 mg) by mouth daily   polyethylene glycol (MIRALAX) 17 g packet   No No   Sig: Take 17 g by mouth daily as needed for constipation   sennosides (SENOKOT) 8.6 MG tablet   No No   Sig: Take 1 tablet by mouth 2 times daily as needed for constipation   sertraline (ZOLOFT) 100 MG tablet   No No   Sig: Take 2 tablets (200 mg) by mouth At Bedtime      Facility-Administered Medications: None           ALLERGIES:  Allergies   Allergen Reactions     Ibuprofen Other (See Comments)     Until cleared by primary MD d/t kidney function     Tomato Unknown       FAMILY HISTORY:  Family History   Problem Relation Age of Onset     Hypertension Mother        SOCIAL HISTORY:   Social History     Socioeconomic History     Marital status: Single     Spouse name: None     Number of children: None     Years of education: None     Highest education level: None   Occupational History     None   Tobacco Use     Smoking status: Former Smoker     Smokeless tobacco: Never Used   Substance and Sexual Activity     Alcohol use: No     Drug use: No     Sexual activity: None   Other Topics Concern     None   Social History Narrative    The patient lives in a foster home.      Social Determinants of Health     Financial Resource Strain: Not on file   Food Insecurity: Not on file   Transportation Needs: Not on file   Physical Activity: Not on file   Stress: Not on file   Social Connections: Not on file   Intimate Partner Violence: Not on file   Housing Stability: Not on file         VITALS:  Patient Vitals for the past 24 hrs:   BP Temp Temp src Pulse Resp SpO2 Weight   12/20/21 1900 98/64 -- -- 70 -- 98 % --   12/20/21 1850 99/58 -- -- 67 -- 98 % --   12/20/21 1650 107/81 97.8  F (36.6   C) Oral 73 18 100 % 77.1 kg (170 lb)       PHYSICAL EXAM    Constitutional:  Well developed, well nourished, no acute distress  EYES: Conjunctivae clear  HENT:  Atraumatic, normocephalic Bilateral external ears normal, nose normal, oropharynx moist. Neck- supple   Respiratory:  No respiratory distress, normal breath sounds  Cardiovascular:  Normal rate, normal rhythm, no murmurs  GI:  Soft, nondistended, nontender, no palpable masses, no rebound, no guarding   Integument: Warm, Dry  Neurologic:  Alert & oriented x 3. Moving her upper and lower extremities normally.     LAB:  All pertinent labs reviewed and interpreted.  Results for orders placed or performed during the hospital encounter of 12/20/21   XR Chest Port 1 View    Impression    IMPRESSION: Scoliosis. Stable chronic left rib anomalies. No pulmonary infiltrates.   Symptomatic; Yes; 12/19/2021 Influenza A/B & SARS-CoV2 (COVID-19) Virus PCR Multiplex Nasopharyngeal    Specimen: Nasopharyngeal; Swab   Result Value Ref Range    Influenza A PCR Negative Negative    Influenza B PCR Negative Negative    SARS CoV2 PCR Negative Negative   UA with Microscopic reflex to Culture    Specimen: Urine, Midstream   Result Value Ref Range    Color Urine Light Yellow Colorless, Straw, Light Yellow, Yellow    Appearance Urine Clear Clear    Glucose Urine Negative Negative mg/dL    Bilirubin Urine Negative Negative    Ketones Urine Negative Negative mg/dL    Specific Gravity Urine 1.015 1.001 - 1.030    Blood Urine Negative Negative    pH Urine 6.5 5.0 - 7.0    Protein Albumin Urine Negative Negative mg/dL    Urobilinogen Urine <2.0 <2.0 mg/dL    Nitrite Urine Negative Negative    Leukocyte Esterase Urine 250 [place]/uL (A) Negative    RBC Urine 2 <=2 /HPF    WBC Urine 8 (H) <=5 /HPF    Squamous Epithelials Urine <1 <=1 /HPF   HCG qualitative urine POCT   Result Value Ref Range    HCG Qual Urine Negative Negative    Internal QC Check POCT Valid Valid    POCT Kit Lot Number [id number]      POCT Kit Expiration Date 2023/03/31        RADIOLOGY:  Reviewed all pertinent imaging. Please see official radiology report.  XR Chest Port 1 View   Final Result   IMPRESSION: Scoliosis. Stable chronic left rib anomalies. No pulmonary infiltrates.            PROCEDURES:   None      I, Giuseppe Russo, am serving as a scribe to document services personally performed by Enoch Junior CNP. based on my observation and the provider's statements to me. IEnoch CNP attest that Giuseppe Russo is acting in a scribe capacity, has observed my performance of the services and has documented them in accordance with my direction.    LAZARA Dobbs, CNP  Emergency Medicine  [facility] EMERGENCY DEPARTMENT  [address] [place] [id number]  [phone]  Dept: [phone]         Enoch Junior APRN CNP  12/20/21 2020

## 2021-12-21 NOTE — ED NOTES
Pt is  alert; denies any Sob; states she feels better and ready to go home; called group home staff to ask if they get the pt;stated they will call nursing supervisor and will get back to us.

## 2021-12-21 NOTE — DISCHARGE INSTRUCTIONS
Your Covid test was negative.  Your chest x-ray did not show any pneumonia or other obvious infection.  Your cough may be due to a mild viral illness.  Continue to use your inhaler as needed.    Your urine test is abnormal suggesting a urinary tract infection.  Urine culture is pending.  You will be started on Keflex.  You were given your first dose of Keflex in the emergency department.  Please fill the prescription tomorrow and continue taking the medicine.    Follow-up in clinic in 1 week for recheck.    Turn to the emergency department for fever, other new/worsening symptoms or other concerns.

## 2021-12-22 ENCOUNTER — LAB (OUTPATIENT)
Dept: LAB | Facility: HOSPITAL | Age: 31
End: 2021-12-22
Payer: MEDICARE

## 2021-12-22 DIAGNOSIS — Z79.899 NEED FOR PROPHYLACTIC CHEMOTHERAPY: ICD-10-CM

## 2021-12-22 DIAGNOSIS — Z13.220 SCREENING FOR LIPOID DISORDERS: Primary | ICD-10-CM

## 2021-12-22 LAB
ALBUMIN SERPL-MCNC: 3.7 G/DL (ref 3.5–5)
ALP SERPL-CCNC: 49 U/L (ref 45–120)
ALT SERPL W P-5'-P-CCNC: 16 U/L (ref 0–45)
AMYLASE SERPL-CCNC: 60 U/L (ref 5–120)
AST SERPL W P-5'-P-CCNC: 18 U/L (ref 0–40)
BACTERIA UR CULT: NORMAL
BASOPHILS # BLD AUTO: 0 10E3/UL (ref 0–0.2)
BASOPHILS NFR BLD AUTO: 0 %
BILIRUB DIRECT SERPL-MCNC: 0.1 MG/DL
BILIRUB SERPL-MCNC: 0.3 MG/DL (ref 0–1)
CHOLEST SERPL-MCNC: 177 MG/DL
EOSINOPHIL # BLD AUTO: 0.2 10E3/UL (ref 0–0.7)
EOSINOPHIL NFR BLD AUTO: 3 %
ERYTHROCYTE [DISTWIDTH] IN BLOOD BY AUTOMATED COUNT: 13.2 % (ref 10–15)
FASTING STATUS PATIENT QL REPORTED: YES
FASTING STATUS PATIENT QL REPORTED: YES
GGT SERPL-CCNC: 23 U/L (ref 0–50)
GLUCOSE BLD-MCNC: 75 MG/DL (ref 70–125)
HBA1C MFR BLD: 5.4 %
HCT VFR BLD AUTO: 41.1 % (ref 35–47)
HDLC SERPL-MCNC: 62 MG/DL
HGB BLD-MCNC: 13.4 G/DL (ref 11.7–15.7)
IMM GRANULOCYTES # BLD: 0.1 10E3/UL
IMM GRANULOCYTES NFR BLD: 1 %
LDLC SERPL CALC-MCNC: 99 MG/DL
LYMPHOCYTES # BLD AUTO: 2.8 10E3/UL (ref 0.8–5.3)
LYMPHOCYTES NFR BLD AUTO: 35 %
MCH RBC QN AUTO: 28 PG (ref 26.5–33)
MCHC RBC AUTO-ENTMCNC: 32.6 G/DL (ref 31.5–36.5)
MCV RBC AUTO: 86 FL (ref 78–100)
MONOCYTES # BLD AUTO: 0.6 10E3/UL (ref 0–1.3)
MONOCYTES NFR BLD AUTO: 7 %
NEUTROPHILS # BLD AUTO: 4.4 10E3/UL (ref 1.6–8.3)
NEUTROPHILS NFR BLD AUTO: 54 %
NRBC # BLD AUTO: 0 10E3/UL
NRBC BLD AUTO-RTO: 0 /100
PLATELET # BLD AUTO: 203 10E3/UL (ref 150–450)
PROT SERPL-MCNC: 6.9 G/DL (ref 6–8)
RBC # BLD AUTO: 4.78 10E6/UL (ref 3.8–5.2)
TRIGL SERPL-MCNC: 78 MG/DL
VALPROATE SERPL-MCNC: 88.4 UG/ML
WBC # BLD AUTO: 8 10E3/UL (ref 4–11)

## 2021-12-22 PROCEDURE — 83036 HEMOGLOBIN GLYCOSYLATED A1C: CPT

## 2021-12-22 PROCEDURE — 36415 COLL VENOUS BLD VENIPUNCTURE: CPT

## 2021-12-22 PROCEDURE — 82150 ASSAY OF AMYLASE: CPT

## 2021-12-22 PROCEDURE — 80076 HEPATIC FUNCTION PANEL: CPT

## 2021-12-22 PROCEDURE — 82947 ASSAY GLUCOSE BLOOD QUANT: CPT

## 2021-12-22 PROCEDURE — 82977 ASSAY OF GGT: CPT

## 2021-12-22 PROCEDURE — 85025 COMPLETE CBC W/AUTO DIFF WBC: CPT

## 2021-12-22 PROCEDURE — 80061 LIPID PANEL: CPT

## 2021-12-22 PROCEDURE — 80164 ASSAY DIPROPYLACETIC ACD TOT: CPT

## 2022-01-01 ENCOUNTER — HOSPITAL ENCOUNTER (OUTPATIENT)
Dept: SPEECH THERAPY | Facility: HOSPITAL | Age: 32
Setting detail: THERAPIES SERIES
Discharge: HOME | End: 2022-12-28
Attending: INTERNAL MEDICINE
Payer: MEDICARE

## 2022-01-01 ENCOUNTER — HOSPITAL ENCOUNTER (OUTPATIENT)
Dept: RADIOLOGY | Facility: HOSPITAL | Age: 32
Discharge: HOME | End: 2022-12-28
Attending: INTERNAL MEDICINE
Payer: MEDICARE

## 2022-01-01 ENCOUNTER — APPOINTMENT (OUTPATIENT)
Dept: CT IMAGING | Facility: HOSPITAL | Age: 32
End: 2022-01-01
Attending: PHYSICIAN ASSISTANT
Payer: MEDICARE

## 2022-01-01 ENCOUNTER — HOSPITAL ENCOUNTER (INPATIENT)
Facility: HOSPITAL | Age: 32
LOS: 2 days | Discharge: NURSING, REHAB OR LONG-TERM CARE | DRG: 177 | End: 2022-10-19
Attending: EMERGENCY MEDICINE | Admitting: HOSPITALIST
Payer: MEDICARE

## 2022-01-01 ENCOUNTER — APPOINTMENT (OUTPATIENT)
Dept: CARDIOLOGY | Facility: HOSPITAL | Age: 32
DRG: 177 | End: 2022-01-01
Attending: FAMILY MEDICINE
Payer: MEDICARE

## 2022-01-01 ENCOUNTER — TELEPHONE (OUTPATIENT)
Dept: EMERGENCY MEDICINE | Facility: CLINIC | Age: 32
End: 2022-01-01

## 2022-01-01 ENCOUNTER — APPOINTMENT (OUTPATIENT)
Dept: RADIOLOGY | Facility: HOSPITAL | Age: 32
DRG: 177 | End: 2022-01-01
Attending: EMERGENCY MEDICINE
Payer: MEDICARE

## 2022-01-01 ENCOUNTER — APPOINTMENT (OUTPATIENT)
Dept: SPEECH THERAPY | Facility: HOSPITAL | Age: 32
DRG: 177 | End: 2022-01-01
Payer: MEDICARE

## 2022-01-01 ENCOUNTER — OFFICE VISIT (OUTPATIENT)
Dept: FAMILY MEDICINE | Facility: CLINIC | Age: 32
End: 2022-01-01
Payer: MEDICARE

## 2022-01-01 ENCOUNTER — APPOINTMENT (OUTPATIENT)
Dept: RADIOLOGY | Facility: HOSPITAL | Age: 32
DRG: 177 | End: 2022-01-01
Attending: FAMILY MEDICINE
Payer: MEDICARE

## 2022-01-01 ENCOUNTER — HOSPITAL ENCOUNTER (EMERGENCY)
Facility: HOSPITAL | Age: 32
Discharge: HOME | End: 2022-09-07
Attending: STUDENT IN AN ORGANIZED HEALTH CARE EDUCATION/TRAINING PROGRAM
Payer: MEDICARE

## 2022-01-01 ENCOUNTER — MEDICAL CORRESPONDENCE (OUTPATIENT)
Dept: HEALTH INFORMATION MANAGEMENT | Facility: CLINIC | Age: 32
End: 2022-01-01

## 2022-01-01 ENCOUNTER — APPOINTMENT (OUTPATIENT)
Dept: CT IMAGING | Facility: HOSPITAL | Age: 32
End: 2022-01-01
Attending: EMERGENCY MEDICINE
Payer: MEDICARE

## 2022-01-01 ENCOUNTER — APPOINTMENT (OUTPATIENT)
Dept: PHYSICAL THERAPY | Facility: HOSPITAL | Age: 32
DRG: 177 | End: 2022-01-01
Attending: INTERNAL MEDICINE
Payer: MEDICARE

## 2022-01-01 ENCOUNTER — HOSPITAL ENCOUNTER (EMERGENCY)
Facility: HOSPITAL | Age: 32
Discharge: HOME | End: 2022-10-11
Attending: EMERGENCY MEDICINE | Admitting: EMERGENCY MEDICINE
Payer: MEDICARE

## 2022-01-01 ENCOUNTER — TRANSCRIBE ORDERS (OUTPATIENT)
Dept: OTHER | Age: 32
End: 2022-01-01

## 2022-01-01 ENCOUNTER — APPOINTMENT (OUTPATIENT)
Dept: CT IMAGING | Facility: HOSPITAL | Age: 32
DRG: 177 | End: 2022-01-01
Attending: HOSPITALIST
Payer: MEDICARE

## 2022-01-01 ENCOUNTER — LAB (OUTPATIENT)
Dept: LAB | Facility: HOSPITAL | Age: 32
End: 2022-01-01
Payer: MEDICARE

## 2022-01-01 ENCOUNTER — LAB REQUISITION (OUTPATIENT)
Dept: LAB | Facility: CLINIC | Age: 32
End: 2022-01-01
Payer: MEDICARE

## 2022-01-01 ENCOUNTER — APPOINTMENT (OUTPATIENT)
Dept: SPEECH THERAPY | Facility: HOSPITAL | Age: 32
DRG: 177 | End: 2022-01-01
Attending: HOSPITALIST
Payer: MEDICARE

## 2022-01-01 ENCOUNTER — HOSPITAL ENCOUNTER (EMERGENCY)
Facility: HOSPITAL | Age: 32
Discharge: HOME | End: 2022-11-13
Attending: EMERGENCY MEDICINE | Admitting: EMERGENCY MEDICINE
Payer: MEDICARE

## 2022-01-01 VITALS
SYSTOLIC BLOOD PRESSURE: 150 MMHG | OXYGEN SATURATION: 99 % | TEMPERATURE: 99 F | RESPIRATION RATE: 20 BRPM | DIASTOLIC BLOOD PRESSURE: 88 MMHG | BODY MASS INDEX: 34.8 KG/M2 | WEIGHT: 184.3 LBS | HEART RATE: 89 BPM | HEIGHT: 61 IN

## 2022-01-01 VITALS
DIASTOLIC BLOOD PRESSURE: 73 MMHG | BODY MASS INDEX: 31.89 KG/M2 | HEIGHT: 63 IN | SYSTOLIC BLOOD PRESSURE: 105 MMHG | HEART RATE: 79 BPM | WEIGHT: 180 LBS | OXYGEN SATURATION: 99 %

## 2022-01-01 VITALS
HEART RATE: 60 BPM | SYSTOLIC BLOOD PRESSURE: 116 MMHG | DIASTOLIC BLOOD PRESSURE: 55 MMHG | TEMPERATURE: 98.7 F | HEIGHT: 63 IN | BODY MASS INDEX: 31.89 KG/M2 | WEIGHT: 180 LBS | OXYGEN SATURATION: 93 % | RESPIRATION RATE: 16 BRPM

## 2022-01-01 VITALS
TEMPERATURE: 98.7 F | SYSTOLIC BLOOD PRESSURE: 117 MMHG | OXYGEN SATURATION: 98 % | HEIGHT: 62 IN | BODY MASS INDEX: 33.13 KG/M2 | RESPIRATION RATE: 18 BRPM | HEART RATE: 88 BPM | WEIGHT: 180 LBS | DIASTOLIC BLOOD PRESSURE: 86 MMHG

## 2022-01-01 VITALS
WEIGHT: 181.4 LBS | BODY MASS INDEX: 34.28 KG/M2 | OXYGEN SATURATION: 96 % | SYSTOLIC BLOOD PRESSURE: 118 MMHG | HEART RATE: 64 BPM | TEMPERATURE: 97.9 F | DIASTOLIC BLOOD PRESSURE: 78 MMHG | RESPIRATION RATE: 16 BRPM

## 2022-01-01 DIAGNOSIS — J69.0 ASPIRATION PNEUMONITIS (H): ICD-10-CM

## 2022-01-01 DIAGNOSIS — R10.12 LUQ ABDOMINAL PAIN: ICD-10-CM

## 2022-01-01 DIAGNOSIS — K21.9 GASTROESOPHAGEAL REFLUX DISEASE WITHOUT ESOPHAGITIS: Primary | ICD-10-CM

## 2022-01-01 DIAGNOSIS — N17.9 ACUTE KIDNEY FAILURE, UNSPECIFIED (H): ICD-10-CM

## 2022-01-01 DIAGNOSIS — K29.70 GASTRITIS: ICD-10-CM

## 2022-01-01 DIAGNOSIS — R19.7 DIARRHEA: ICD-10-CM

## 2022-01-01 DIAGNOSIS — R10.84 ABDOMINAL PAIN, GENERALIZED: ICD-10-CM

## 2022-01-01 DIAGNOSIS — R35.0 URINE FREQUENCY: ICD-10-CM

## 2022-01-01 DIAGNOSIS — G40.909 SEIZURE DISORDER (H): ICD-10-CM

## 2022-01-01 DIAGNOSIS — F79 INTELLECTUAL DISABILITY: ICD-10-CM

## 2022-01-01 DIAGNOSIS — R09.02 HYPOXIA: ICD-10-CM

## 2022-01-01 DIAGNOSIS — R13.10 DYSPHAGIA, UNSPECIFIED TYPE: ICD-10-CM

## 2022-01-01 DIAGNOSIS — R13.10 DYSPHAGIA, UNSPECIFIED: Primary | ICD-10-CM

## 2022-01-01 DIAGNOSIS — K52.9 ILEITIS: Primary | ICD-10-CM

## 2022-01-01 DIAGNOSIS — R10.84 ABDOMINAL PAIN, GENERALIZED: Primary | ICD-10-CM

## 2022-01-01 DIAGNOSIS — K59.00 CONSTIPATION, UNSPECIFIED CONSTIPATION TYPE: ICD-10-CM

## 2022-01-01 DIAGNOSIS — N83.202 LEFT OVARIAN CYST: ICD-10-CM

## 2022-01-01 DIAGNOSIS — K92.1 BLOOD IN STOOL: ICD-10-CM

## 2022-01-01 DIAGNOSIS — J45.901 ASTHMA WITH ACUTE EXACERBATION, UNSPECIFIED ASTHMA SEVERITY, UNSPECIFIED WHETHER PERSISTENT: ICD-10-CM

## 2022-01-01 DIAGNOSIS — N39.0 URINARY TRACT INFECTION: ICD-10-CM

## 2022-01-01 LAB
ALBUMIN SERPL BCG-MCNC: 3.2 G/DL (ref 3.5–5.2)
ALBUMIN SERPL BCG-MCNC: 3.6 G/DL (ref 3.5–5.2)
ALBUMIN SERPL BCG-MCNC: 3.9 G/DL (ref 3.5–5.2)
ALBUMIN SERPL BCG-MCNC: 4.3 G/DL (ref 3.5–5.2)
ALBUMIN SERPL-MCNC: 3.8 G/DL (ref 3.5–5)
ALBUMIN UR-MCNC: NEGATIVE MG/DL
ALP SERPL-CCNC: 46 U/L (ref 35–104)
ALP SERPL-CCNC: 48 U/L (ref 45–120)
ALP SERPL-CCNC: 53 U/L (ref 35–104)
ALT SERPL W P-5'-P-CCNC: 12 U/L (ref 10–35)
ALT SERPL W P-5'-P-CCNC: 13 U/L (ref 10–35)
ALT SERPL W P-5'-P-CCNC: <9 U/L (ref 0–45)
ANION GAP SERPL CALCULATED.3IONS-SCNC: 10 MMOL/L (ref 7–15)
ANION GAP SERPL CALCULATED.3IONS-SCNC: 10 MMOL/L (ref 7–15)
ANION GAP SERPL CALCULATED.3IONS-SCNC: 11 MMOL/L (ref 7–15)
ANION GAP SERPL CALCULATED.3IONS-SCNC: 4 MMOL/L (ref 7–15)
ANION GAP SERPL CALCULATED.3IONS-SCNC: 6 MMOL/L (ref 7–15)
ANION GAP SERPL CALCULATED.3IONS-SCNC: 7 MMOL/L (ref 7–15)
ANION GAP SERPL CALCULATED.3IONS-SCNC: 9 MMOL/L (ref 5–18)
APPEARANCE UR: CLEAR
AST SERPL W P-5'-P-CCNC: 17 U/L (ref 10–35)
AST SERPL W P-5'-P-CCNC: 18 U/L (ref 0–40)
AST SERPL W P-5'-P-CCNC: 18 U/L (ref 10–35)
ATRIAL RATE - MUSE: 119 BPM
BACTERIA #/AREA URNS HPF: ABNORMAL /HPF
BACTERIA #/AREA URNS HPF: ABNORMAL /HPF
BACTERIA #/AREA URNS HPF: NORMAL /HPF
BACTERIA BLD CULT: ABNORMAL
BACTERIA BLD CULT: ABNORMAL
BACTERIA BLD CULT: NO GROWTH
BACTERIA UR CULT: NORMAL
BACTERIA UR CULT: NORMAL
BASE EXCESS BLDV CALC-SCNC: 4.2 MMOL/L
BASOPHILS # BLD AUTO: 0 10E3/UL (ref 0–0.2)
BASOPHILS NFR BLD AUTO: 0 %
BILIRUB DIRECT SERPL-MCNC: <0.1 MG/DL
BILIRUB DIRECT SERPL-MCNC: <0.2 MG/DL (ref 0–0.3)
BILIRUB DIRECT SERPL-MCNC: <0.2 MG/DL (ref 0–0.3)
BILIRUB SERPL-MCNC: 0.2 MG/DL
BILIRUB SERPL-MCNC: 0.2 MG/DL (ref 0–1)
BILIRUB SERPL-MCNC: <0.2 MG/DL
BILIRUB UR QL STRIP: NEGATIVE
BUN SERPL-MCNC: 13 MG/DL (ref 8–22)
BUN SERPL-MCNC: 14.9 MG/DL (ref 6–20)
BUN SERPL-MCNC: 18.6 MG/DL (ref 6–20)
BUN SERPL-MCNC: 7.7 MG/DL (ref 6–20)
BUN SERPL-MCNC: 8.6 MG/DL (ref 6–20)
BUN SERPL-MCNC: 8.8 MG/DL (ref 6–20)
BUN SERPL-MCNC: 9.9 MG/DL (ref 6–20)
C COLI+JEJUNI+LARI FUSA STL QL NAA+PROBE: NOT DETECTED
C DIFF TOX B STL QL: NEGATIVE
CALCIUM SERPL-MCNC: 8.3 MG/DL (ref 8.6–10)
CALCIUM SERPL-MCNC: 8.4 MG/DL (ref 8.6–10)
CALCIUM SERPL-MCNC: 8.5 MG/DL (ref 8.6–10)
CALCIUM SERPL-MCNC: 8.8 MG/DL (ref 8.5–10.5)
CALCIUM SERPL-MCNC: 8.8 MG/DL (ref 8.6–10)
CALCIUM SERPL-MCNC: 8.9 MG/DL (ref 8.6–10)
CALCIUM SERPL-MCNC: 9.1 MG/DL (ref 8.6–10)
CHLORIDE BLD-SCNC: 101 MMOL/L (ref 98–107)
CHLORIDE SERPL-SCNC: 100 MMOL/L (ref 98–107)
CHLORIDE SERPL-SCNC: 101 MMOL/L (ref 98–107)
CHLORIDE SERPL-SCNC: 102 MMOL/L (ref 98–107)
CHLORIDE SERPL-SCNC: 103 MMOL/L (ref 98–107)
CO2 SERPL-SCNC: 26 MMOL/L (ref 22–31)
COLOR UR AUTO: ABNORMAL
COLOR UR AUTO: ABNORMAL
COLOR UR AUTO: COLORLESS
COLOR UR AUTO: YELLOW
CREAT SERPL-MCNC: 0.91 MG/DL (ref 0.51–0.95)
CREAT SERPL-MCNC: 0.98 MG/DL (ref 0.51–0.95)
CREAT SERPL-MCNC: 1 MG/DL (ref 0.51–0.95)
CREAT SERPL-MCNC: 1.05 MG/DL (ref 0.51–0.95)
CREAT SERPL-MCNC: 1.08 MG/DL (ref 0.6–1.1)
CREAT SERPL-MCNC: 1.09 MG/DL (ref 0.51–0.95)
CREAT SERPL-MCNC: 1.11 MG/DL (ref 0.51–0.95)
D DIMER PPP FEU-MCNC: 0.92 UG/ML FEU (ref 0–0.5)
DEPRECATED HCO3 PLAS-SCNC: 27 MMOL/L (ref 22–29)
DEPRECATED HCO3 PLAS-SCNC: 27 MMOL/L (ref 22–29)
DEPRECATED HCO3 PLAS-SCNC: 29 MMOL/L (ref 22–29)
DEPRECATED HCO3 PLAS-SCNC: 32 MMOL/L (ref 22–29)
DEPRECATED HCO3 PLAS-SCNC: 32 MMOL/L (ref 22–29)
DEPRECATED HCO3 PLAS-SCNC: 33 MMOL/L (ref 22–29)
DIASTOLIC BLOOD PRESSURE - MUSE: 89 MMHG
EC STX1 GENE STL QL NAA+PROBE: NOT DETECTED
EC STX2 GENE STL QL NAA+PROBE: NOT DETECTED
ENTEROCOCCUS FAECALIS: NOT DETECTED
ENTEROCOCCUS FAECIUM: NOT DETECTED
EOSINOPHIL # BLD AUTO: 0.1 10E3/UL (ref 0–0.7)
EOSINOPHIL # BLD AUTO: 0.1 10E3/UL (ref 0–0.7)
EOSINOPHIL # BLD AUTO: 0.3 10E3/UL (ref 0–0.7)
EOSINOPHIL NFR BLD AUTO: 1 %
EOSINOPHIL NFR BLD AUTO: 2 %
EOSINOPHIL NFR BLD AUTO: 3 %
ERYTHROCYTE [DISTWIDTH] IN BLOOD BY AUTOMATED COUNT: 13.4 % (ref 10–15)
ERYTHROCYTE [DISTWIDTH] IN BLOOD BY AUTOMATED COUNT: 13.9 % (ref 10–15)
ERYTHROCYTE [DISTWIDTH] IN BLOOD BY AUTOMATED COUNT: 14.6 % (ref 10–15)
ERYTHROCYTE [DISTWIDTH] IN BLOOD BY AUTOMATED COUNT: 14.7 % (ref 10–15)
ERYTHROCYTE [DISTWIDTH] IN BLOOD BY AUTOMATED COUNT: 15.7 % (ref 10–15)
FLUAV RNA SPEC QL NAA+PROBE: NEGATIVE
FLUBV RNA RESP QL NAA+PROBE: NEGATIVE
GFR SERPL CREATININE-BSD FRML MDRD: 67 ML/MIN/1.73M2
GFR SERPL CREATININE-BSD FRML MDRD: 68 ML/MIN/1.73M2
GFR SERPL CREATININE-BSD FRML MDRD: 69 ML/MIN/1.73M2
GFR SERPL CREATININE-BSD FRML MDRD: 72 ML/MIN/1.73M2
GFR SERPL CREATININE-BSD FRML MDRD: 76 ML/MIN/1.73M2
GFR SERPL CREATININE-BSD FRML MDRD: 78 ML/MIN/1.73M2
GFR SERPL CREATININE-BSD FRML MDRD: 85 ML/MIN/1.73M2
GLUCOSE BLD-MCNC: 98 MG/DL (ref 70–125)
GLUCOSE SERPL-MCNC: 111 MG/DL (ref 70–99)
GLUCOSE SERPL-MCNC: 113 MG/DL (ref 70–99)
GLUCOSE SERPL-MCNC: 143 MG/DL (ref 70–99)
GLUCOSE SERPL-MCNC: 72 MG/DL (ref 70–99)
GLUCOSE SERPL-MCNC: 77 MG/DL (ref 70–99)
GLUCOSE SERPL-MCNC: 85 MG/DL (ref 70–99)
GLUCOSE UR STRIP-MCNC: NEGATIVE MG/DL
HCG SERPL QL: NEGATIVE
HCO3 BLDV-SCNC: 25 MMOL/L (ref 24–30)
HCT VFR BLD AUTO: 33 % (ref 35–47)
HCT VFR BLD AUTO: 35.3 % (ref 35–47)
HCT VFR BLD AUTO: 38.2 % (ref 35–47)
HCT VFR BLD AUTO: 39.4 % (ref 35–47)
HCT VFR BLD AUTO: 39.9 % (ref 35–47)
HGB BLD-MCNC: 10.9 G/DL (ref 11.7–15.7)
HGB BLD-MCNC: 11.4 G/DL (ref 11.7–15.7)
HGB BLD-MCNC: 12.4 G/DL (ref 11.7–15.7)
HGB BLD-MCNC: 12.7 G/DL (ref 11.7–15.7)
HGB BLD-MCNC: 13 G/DL (ref 11.7–15.7)
HGB UR QL STRIP: ABNORMAL
HGB UR QL STRIP: NEGATIVE
HOLD SPECIMEN: NORMAL
HYALINE CASTS: 1 /LPF
IMM GRANULOCYTES # BLD: 0 10E3/UL
IMM GRANULOCYTES # BLD: 0 10E3/UL
IMM GRANULOCYTES # BLD: 0.1 10E3/UL
IMM GRANULOCYTES NFR BLD: 0 %
IMM GRANULOCYTES NFR BLD: 1 %
IMM GRANULOCYTES NFR BLD: 1 %
INTERPRETATION ECG - MUSE: NORMAL
KETONES UR STRIP-MCNC: NEGATIVE MG/DL
LEUKOCYTE ESTERASE UR QL STRIP: ABNORMAL
LEUKOCYTE ESTERASE UR QL STRIP: NEGATIVE
LIPASE SERPL-CCNC: 14 U/L (ref 13–60)
LIPASE SERPL-CCNC: 15 U/L (ref 0–52)
LIPASE SERPL-CCNC: 17 U/L (ref 13–60)
LISTERIA SPECIES (DETECTED/NOT DETECTED): NOT DETECTED
LVEF ECHO: NORMAL
LYMPHOCYTES # BLD AUTO: 1.6 10E3/UL (ref 0.8–5.3)
LYMPHOCYTES # BLD AUTO: 2.1 10E3/UL (ref 0.8–5.3)
LYMPHOCYTES # BLD AUTO: 2.6 10E3/UL (ref 0.8–5.3)
LYMPHOCYTES NFR BLD AUTO: 15 %
LYMPHOCYTES NFR BLD AUTO: 27 %
LYMPHOCYTES NFR BLD AUTO: 31 %
MAGNESIUM SERPL-MCNC: 2.2 MG/DL (ref 1.7–2.3)
MAGNESIUM SERPL-MCNC: 2.5 MG/DL (ref 1.7–2.3)
MCH RBC QN AUTO: 27.7 PG (ref 26.5–33)
MCH RBC QN AUTO: 27.8 PG (ref 26.5–33)
MCH RBC QN AUTO: 28.1 PG (ref 26.5–33)
MCH RBC QN AUTO: 28.1 PG (ref 26.5–33)
MCH RBC QN AUTO: 28.2 PG (ref 26.5–33)
MCHC RBC AUTO-ENTMCNC: 32.2 G/DL (ref 31.5–36.5)
MCHC RBC AUTO-ENTMCNC: 32.3 G/DL (ref 31.5–36.5)
MCHC RBC AUTO-ENTMCNC: 32.5 G/DL (ref 31.5–36.5)
MCHC RBC AUTO-ENTMCNC: 32.6 G/DL (ref 31.5–36.5)
MCHC RBC AUTO-ENTMCNC: 33 G/DL (ref 31.5–36.5)
MCV RBC AUTO: 85 FL (ref 78–100)
MCV RBC AUTO: 85 FL (ref 78–100)
MCV RBC AUTO: 86 FL (ref 78–100)
MCV RBC AUTO: 87 FL (ref 78–100)
MCV RBC AUTO: 87 FL (ref 78–100)
MONOCYTES # BLD AUTO: 0.5 10E3/UL (ref 0–1.3)
MONOCYTES # BLD AUTO: 0.6 10E3/UL (ref 0–1.3)
MONOCYTES # BLD AUTO: 0.7 10E3/UL (ref 0–1.3)
MONOCYTES NFR BLD AUTO: 5 %
MONOCYTES NFR BLD AUTO: 8 %
MONOCYTES NFR BLD AUTO: 8 %
MUCOUS THREADS #/AREA URNS LPF: PRESENT /LPF
MUCOUS THREADS #/AREA URNS LPF: PRESENT /LPF
NEUTROPHILS # BLD AUTO: 3.9 10E3/UL (ref 1.6–8.3)
NEUTROPHILS # BLD AUTO: 5.8 10E3/UL (ref 1.6–8.3)
NEUTROPHILS # BLD AUTO: 8.4 10E3/UL (ref 1.6–8.3)
NEUTROPHILS NFR BLD AUTO: 58 %
NEUTROPHILS NFR BLD AUTO: 61 %
NEUTROPHILS NFR BLD AUTO: 79 %
NITRATE UR QL: NEGATIVE
NOROV GI+II ORF1-ORF2 JNC STL QL NAA+PR: NOT DETECTED
NRBC # BLD AUTO: 0 10E3/UL
NRBC BLD AUTO-RTO: 0 /100
NT-PROBNP SERPL-MCNC: 636 PG/ML (ref 0–450)
OXYHGB MFR BLDV: 26.1 % (ref 70–75)
P AXIS - MUSE: 57 DEGREES
PCO2 BLDV: 63 MM HG (ref 35–50)
PH BLDV: 7.3 [PH] (ref 7.35–7.45)
PH UR STRIP: 5.5 [PH] (ref 5–7)
PH UR STRIP: 5.5 [PH] (ref 5–7)
PH UR STRIP: 6 [PH] (ref 5–7)
PH UR STRIP: 7 [PH] (ref 5–8)
PLATELET # BLD AUTO: 178 10E3/UL (ref 150–450)
PLATELET # BLD AUTO: 192 10E3/UL (ref 150–450)
PLATELET # BLD AUTO: 203 10E3/UL (ref 150–450)
PLATELET # BLD AUTO: 211 10E3/UL (ref 150–450)
PLATELET # BLD AUTO: 215 10E3/UL (ref 150–450)
PO2 BLDV: 20 MM HG (ref 25–47)
POTASSIUM BLD-SCNC: 4.4 MMOL/L (ref 3.5–5)
POTASSIUM SERPL-SCNC: 3.9 MMOL/L (ref 3.4–5.3)
POTASSIUM SERPL-SCNC: 4.2 MMOL/L (ref 3.4–5.3)
POTASSIUM SERPL-SCNC: 4.3 MMOL/L (ref 3.4–5.3)
POTASSIUM SERPL-SCNC: 4.7 MMOL/L (ref 3.4–5.3)
POTASSIUM SERPL-SCNC: 4.7 MMOL/L (ref 3.4–5.3)
POTASSIUM SERPL-SCNC: 4.8 MMOL/L (ref 3.4–5.3)
POTASSIUM SERPL-SCNC: 5 MMOL/L (ref 3.4–5.3)
PR INTERVAL - MUSE: 174 MS
PROCALCITONIN SERPL IA-MCNC: 0.12 NG/ML
PROT SERPL-MCNC: 6.6 G/DL (ref 6.4–8.3)
PROT SERPL-MCNC: 7 G/DL (ref 6.4–8.3)
PROT SERPL-MCNC: 7.3 G/DL (ref 6–8)
QRS DURATION - MUSE: 62 MS
QT - MUSE: 286 MS
QTC - MUSE: 402 MS
R AXIS - MUSE: 85 DEGREES
RBC # BLD AUTO: 3.88 10E6/UL (ref 3.8–5.2)
RBC # BLD AUTO: 4.05 10E6/UL (ref 3.8–5.2)
RBC # BLD AUTO: 4.4 10E6/UL (ref 3.8–5.2)
RBC # BLD AUTO: 4.58 10E6/UL (ref 3.8–5.2)
RBC # BLD AUTO: 4.68 10E6/UL (ref 3.8–5.2)
RBC #/AREA URNS AUTO: NORMAL /HPF
RBC URINE: 1 /HPF
RSV RNA SPEC NAA+PROBE: NEGATIVE
RVA NSP5 STL QL NAA+PROBE: NOT DETECTED
SALMONELLA SP RPOD STL QL NAA+PROBE: NOT DETECTED
SAO2 % BLDV: 26.6 % (ref 70–75)
SARS-COV-2 RNA RESP QL NAA+PROBE: NEGATIVE
SHIGELLA SP+EIEC IPAH STL QL NAA+PROBE: NOT DETECTED
SODIUM SERPL-SCNC: 136 MMOL/L (ref 136–145)
SODIUM SERPL-SCNC: 138 MMOL/L (ref 136–145)
SODIUM SERPL-SCNC: 139 MMOL/L (ref 136–145)
SODIUM SERPL-SCNC: 140 MMOL/L (ref 136–145)
SODIUM SERPL-SCNC: 141 MMOL/L (ref 136–145)
SP GR UR STRIP: 1.01 (ref 1–1.03)
SP GR UR STRIP: 1.01 (ref 1–1.03)
SP GR UR STRIP: 1.02 (ref 1–1.03)
SP GR UR STRIP: 1.02 (ref 1–1.03)
SQUAMOUS EPITHELIAL: 1 /HPF
SQUAMOUS EPITHELIAL: 2 /HPF
SQUAMOUS EPITHELIAL: <1 /HPF
STAPHYLOCOCCUS AUREUS: NOT DETECTED
STAPHYLOCOCCUS EPIDERMIDIS: NOT DETECTED
STAPHYLOCOCCUS LUGDUNENSIS: NOT DETECTED
STAPHYLOCOCCUS SPECIES: DETECTED
STREPTOCOCCUS AGALACTIAE: NOT DETECTED
STREPTOCOCCUS ANGINOSUS GROUP: NOT DETECTED
STREPTOCOCCUS PNEUMONIAE: NOT DETECTED
STREPTOCOCCUS PYOGENES: NOT DETECTED
STREPTOCOCCUS SPECIES: NOT DETECTED
SYSTOLIC BLOOD PRESSURE - MUSE: 121 MMHG
T AXIS - MUSE: 18 DEGREES
TRANSITIONAL EPI: 1 /HPF
TRANSITIONAL EPI: 1 /HPF
TROPONIN T SERPL HS-MCNC: 11 NG/L
TROPONIN T SERPL HS-MCNC: 12 NG/L
UROBILINOGEN UR STRIP-ACNC: 0.2 E.U./DL
UROBILINOGEN UR STRIP-MCNC: <2 MG/DL
V CHOL+PARA RFBL+TRKH+TNAA STL QL NAA+PR: NOT DETECTED
VENTRICULAR RATE- MUSE: 119 BPM
WBC # BLD AUTO: 10.7 10E3/UL (ref 4–11)
WBC # BLD AUTO: 12.8 10E3/UL (ref 4–11)
WBC # BLD AUTO: 6.6 10E3/UL (ref 4–11)
WBC # BLD AUTO: 7.8 10E3/UL (ref 4–11)
WBC # BLD AUTO: 9.4 10E3/UL (ref 4–11)
WBC #/AREA URNS AUTO: NORMAL /HPF
WBC URINE: 1 /HPF
WBC URINE: 13 /HPF
WBC URINE: 13 /HPF
Y ENTERO RECN STL QL NAA+PROBE: NOT DETECTED

## 2022-01-01 PROCEDURE — 94640 AIRWAY INHALATION TREATMENT: CPT

## 2022-01-01 PROCEDURE — 250N000011 HC RX IP 250 OP 636: Performed by: EMERGENCY MEDICINE

## 2022-01-01 PROCEDURE — 250N000009 HC RX 250: Performed by: FAMILY MEDICINE

## 2022-01-01 PROCEDURE — 250N000013 HC RX MED GY IP 250 OP 250 PS 637: Performed by: HOSPITALIST

## 2022-01-01 PROCEDURE — 999N000127 HC STATISTIC PERIPHERAL IV START W US GUIDANCE

## 2022-01-01 PROCEDURE — 85027 COMPLETE CBC AUTOMATED: CPT | Performed by: EMERGENCY MEDICINE

## 2022-01-01 PROCEDURE — 93306 TTE W/DOPPLER COMPLETE: CPT | Mod: 26 | Performed by: INTERNAL MEDICINE

## 2022-01-01 PROCEDURE — 36569 INSJ PICC 5 YR+ W/O IMAGING: CPT

## 2022-01-01 PROCEDURE — 250N000012 HC RX MED GY IP 250 OP 636 PS 637: Performed by: FAMILY MEDICINE

## 2022-01-01 PROCEDURE — 85014 HEMATOCRIT: CPT | Performed by: EMERGENCY MEDICINE

## 2022-01-01 PROCEDURE — G1010 CDSM STANSON: HCPCS

## 2022-01-01 PROCEDURE — 272N000451 HC KIT SHRLOCK 5FR POWER PICC DOUBLE LUMEN

## 2022-01-01 PROCEDURE — 85025 COMPLETE CBC W/AUTO DIFF WBC: CPT | Performed by: EMERGENCY MEDICINE

## 2022-01-01 PROCEDURE — 87506 IADNA-DNA/RNA PROBE TQ 6-11: CPT

## 2022-01-01 PROCEDURE — 99285 EMERGENCY DEPT VISIT HI MDM: CPT | Mod: 25

## 2022-01-01 PROCEDURE — 82310 ASSAY OF CALCIUM: CPT | Performed by: FAMILY MEDICINE

## 2022-01-01 PROCEDURE — 82040 ASSAY OF SERUM ALBUMIN: CPT | Performed by: FAMILY MEDICINE

## 2022-01-01 PROCEDURE — 250N000009 HC RX 250: Performed by: EMERGENCY MEDICINE

## 2022-01-01 PROCEDURE — 36415 COLL VENOUS BLD VENIPUNCTURE: CPT | Performed by: EMERGENCY MEDICINE

## 2022-01-01 PROCEDURE — 99232 SBSQ HOSP IP/OBS MODERATE 35: CPT | Performed by: FAMILY MEDICINE

## 2022-01-01 PROCEDURE — 999N000157 HC STATISTIC RCP TIME EA 10 MIN

## 2022-01-01 PROCEDURE — 250N000013 HC RX MED GY IP 250 OP 250 PS 637: Performed by: FAMILY MEDICINE

## 2022-01-01 PROCEDURE — 92611 MOTION FLUOROSCOPY/SWALLOW: CPT | Mod: GN

## 2022-01-01 PROCEDURE — 81001 URINALYSIS AUTO W/SCOPE: CPT | Performed by: PHYSICIAN ASSISTANT

## 2022-01-01 PROCEDURE — 36415 COLL VENOUS BLD VENIPUNCTURE: CPT | Performed by: HOSPITALIST

## 2022-01-01 PROCEDURE — 74220 X-RAY XM ESOPHAGUS 1CNTRST: CPT

## 2022-01-01 PROCEDURE — 87086 URINE CULTURE/COLONY COUNT: CPT | Performed by: EMERGENCY MEDICINE

## 2022-01-01 PROCEDURE — 250N000009 HC RX 250: Performed by: HOSPITALIST

## 2022-01-01 PROCEDURE — 255N000002 HC RX 255 OP 636: Performed by: FAMILY MEDICINE

## 2022-01-01 PROCEDURE — 83690 ASSAY OF LIPASE: CPT | Performed by: EMERGENCY MEDICINE

## 2022-01-01 PROCEDURE — 92526 ORAL FUNCTION THERAPY: CPT | Mod: GN

## 2022-01-01 PROCEDURE — 84484 ASSAY OF TROPONIN QUANT: CPT | Performed by: HOSPITALIST

## 2022-01-01 PROCEDURE — 82248 BILIRUBIN DIRECT: CPT | Performed by: EMERGENCY MEDICINE

## 2022-01-01 PROCEDURE — 85379 FIBRIN DEGRADATION QUANT: CPT | Performed by: EMERGENCY MEDICINE

## 2022-01-01 PROCEDURE — 93005 ELECTROCARDIOGRAM TRACING: CPT | Performed by: EMERGENCY MEDICINE

## 2022-01-01 PROCEDURE — 36415 COLL VENOUS BLD VENIPUNCTURE: CPT | Performed by: FAMILY MEDICINE

## 2022-01-01 PROCEDURE — 94640 AIRWAY INHALATION TREATMENT: CPT | Mod: 76

## 2022-01-01 PROCEDURE — 84145 PROCALCITONIN (PCT): CPT | Performed by: EMERGENCY MEDICINE

## 2022-01-01 PROCEDURE — 82435 ASSAY OF BLOOD CHLORIDE: CPT | Performed by: EMERGENCY MEDICINE

## 2022-01-01 PROCEDURE — 96361 HYDRATE IV INFUSION ADD-ON: CPT

## 2022-01-01 PROCEDURE — 250N000011 HC RX IP 250 OP 636: Performed by: HOSPITALIST

## 2022-01-01 PROCEDURE — 99239 HOSP IP/OBS DSCHRG MGMT >30: CPT | Performed by: INTERNAL MEDICINE

## 2022-01-01 PROCEDURE — 71046 X-RAY EXAM CHEST 2 VIEWS: CPT

## 2022-01-01 PROCEDURE — 97161 PT EVAL LOW COMPLEX 20 MIN: CPT | Mod: GP

## 2022-01-01 PROCEDURE — 80048 BASIC METABOLIC PNL TOTAL CA: CPT | Performed by: HOSPITALIST

## 2022-01-01 PROCEDURE — 99204 OFFICE O/P NEW MOD 45 MIN: CPT | Performed by: PHYSICIAN ASSISTANT

## 2022-01-01 PROCEDURE — 87040 BLOOD CULTURE FOR BACTERIA: CPT | Performed by: EMERGENCY MEDICINE

## 2022-01-01 PROCEDURE — 83735 ASSAY OF MAGNESIUM: CPT | Performed by: FAMILY MEDICINE

## 2022-01-01 PROCEDURE — 120N000001 HC R&B MED SURG/OB

## 2022-01-01 PROCEDURE — 96374 THER/PROPH/DIAG INJ IV PUSH: CPT | Mod: 59

## 2022-01-01 PROCEDURE — 80048 BASIC METABOLIC PNL TOTAL CA: CPT | Mod: ORL | Performed by: FAMILY MEDICINE

## 2022-01-01 PROCEDURE — 96374 THER/PROPH/DIAG INJ IV PUSH: CPT

## 2022-01-01 PROCEDURE — 85014 HEMATOCRIT: CPT | Performed by: HOSPITALIST

## 2022-01-01 PROCEDURE — 99223 1ST HOSP IP/OBS HIGH 75: CPT | Performed by: HOSPITALIST

## 2022-01-01 PROCEDURE — 250N000013 HC RX MED GY IP 250 OP 250 PS 637: Performed by: PHYSICIAN ASSISTANT

## 2022-01-01 PROCEDURE — 84703 CHORIONIC GONADOTROPIN ASSAY: CPT | Performed by: EMERGENCY MEDICINE

## 2022-01-01 PROCEDURE — 258N000003 HC RX IP 258 OP 636: Performed by: EMERGENCY MEDICINE

## 2022-01-01 PROCEDURE — 87493 C DIFF AMPLIFIED PROBE: CPT

## 2022-01-01 PROCEDURE — 250N000011 HC RX IP 250 OP 636: Performed by: STUDENT IN AN ORGANIZED HEALTH CARE EDUCATION/TRAINING PROGRAM

## 2022-01-01 PROCEDURE — 82374 ASSAY BLOOD CARBON DIOXIDE: CPT | Performed by: EMERGENCY MEDICINE

## 2022-01-01 PROCEDURE — 83735 ASSAY OF MAGNESIUM: CPT | Performed by: EMERGENCY MEDICINE

## 2022-01-01 PROCEDURE — 81003 URINALYSIS AUTO W/O SCOPE: CPT | Performed by: EMERGENCY MEDICINE

## 2022-01-01 PROCEDURE — 74177 CT ABD & PELVIS W/CONTRAST: CPT | Mod: MG

## 2022-01-01 PROCEDURE — 999N000285 HC STATISTIC VASC ACCESS LAB DRAW WITH PIV START

## 2022-01-01 PROCEDURE — 82310 ASSAY OF CALCIUM: CPT | Performed by: EMERGENCY MEDICINE

## 2022-01-01 PROCEDURE — 272N000202 HC AEROBIKA WITH MANOMETER

## 2022-01-01 PROCEDURE — G0463 HOSPITAL OUTPT CLINIC VISIT: HCPCS

## 2022-01-01 PROCEDURE — 83880 ASSAY OF NATRIURETIC PEPTIDE: CPT | Performed by: EMERGENCY MEDICINE

## 2022-01-01 PROCEDURE — 87149 DNA/RNA DIRECT PROBE: CPT | Performed by: EMERGENCY MEDICINE

## 2022-01-01 PROCEDURE — 84132 ASSAY OF SERUM POTASSIUM: CPT | Performed by: FAMILY MEDICINE

## 2022-01-01 PROCEDURE — C9803 HOPD COVID-19 SPEC COLLECT: HCPCS

## 2022-01-01 PROCEDURE — 92610 EVALUATE SWALLOWING FUNCTION: CPT | Mod: GN

## 2022-01-01 PROCEDURE — 84484 ASSAY OF TROPONIN QUANT: CPT | Performed by: EMERGENCY MEDICINE

## 2022-01-01 PROCEDURE — 250N000011 HC RX IP 250 OP 636: Performed by: FAMILY MEDICINE

## 2022-01-01 PROCEDURE — 82805 BLOOD GASES W/O2 SATURATION: CPT | Performed by: EMERGENCY MEDICINE

## 2022-01-01 PROCEDURE — 74230 X-RAY XM SWLNG FUNCJ C+: CPT

## 2022-01-01 PROCEDURE — 255N000001 HC RX 255: Performed by: FAMILY MEDICINE

## 2022-01-01 PROCEDURE — 250N000009 HC RX 250: Performed by: PHYSICIAN ASSISTANT

## 2022-01-01 PROCEDURE — 94799 UNLISTED PULMONARY SVC/PX: CPT

## 2022-01-01 PROCEDURE — 87181 SC STD AGAR DILUTION PER AGT: CPT | Performed by: EMERGENCY MEDICINE

## 2022-01-01 PROCEDURE — 250N000013 HC RX MED GY IP 250 OP 250 PS 637: Performed by: EMERGENCY MEDICINE

## 2022-01-01 PROCEDURE — 87637 SARSCOV2&INF A&B&RSV AMP PRB: CPT | Performed by: EMERGENCY MEDICINE

## 2022-01-01 PROCEDURE — 87086 URINE CULTURE/COLONY COUNT: CPT | Performed by: PHYSICIAN ASSISTANT

## 2022-01-01 PROCEDURE — 81001 URINALYSIS AUTO W/SCOPE: CPT | Performed by: EMERGENCY MEDICINE

## 2022-01-01 RX ORDER — OLANZAPINE 5 MG/1
5 TABLET ORAL DAILY PRN
COMMUNITY

## 2022-01-01 RX ORDER — DEXAMETHASONE SODIUM PHOSPHATE 10 MG/ML
10 INJECTION, SOLUTION INTRAMUSCULAR; INTRAVENOUS ONCE
Status: COMPLETED | OUTPATIENT
Start: 2022-01-01 | End: 2022-01-01

## 2022-01-01 RX ORDER — DIVALPROEX SODIUM 500 MG/1
2000 TABLET, DELAYED RELEASE ORAL AT BEDTIME
Status: DISCONTINUED | OUTPATIENT
Start: 2022-01-01 | End: 2022-01-01

## 2022-01-01 RX ORDER — DOCUSATE SODIUM 100 MG/1
100 CAPSULE, LIQUID FILLED ORAL 2 TIMES DAILY
Qty: 14 CAPSULE | Refills: 0 | Status: SHIPPED | OUTPATIENT
Start: 2022-01-01 | End: 2022-01-01

## 2022-01-01 RX ORDER — PREDNISONE 20 MG/1
40 TABLET ORAL DAILY
Qty: 4 TABLET | Refills: 0 | Status: SHIPPED | OUTPATIENT
Start: 2022-01-01 | End: 2022-01-01

## 2022-01-01 RX ORDER — SUCRALFATE 1 G/1
1 TABLET ORAL 4 TIMES DAILY
Qty: 28 TABLET | Refills: 0 | Status: SHIPPED | OUTPATIENT
Start: 2022-01-01 | End: 2022-01-01

## 2022-01-01 RX ORDER — ALBUTEROL SULFATE 90 UG/1
2 AEROSOL, METERED RESPIRATORY (INHALATION) EVERY 6 HOURS PRN
Status: DISCONTINUED | OUTPATIENT
Start: 2022-01-01 | End: 2022-01-01 | Stop reason: HOSPADM

## 2022-01-01 RX ORDER — DIPHENHYDRAMINE HCL 25 MG
25 TABLET ORAL DAILY PRN
COMMUNITY

## 2022-01-01 RX ORDER — DOCUSATE SODIUM 100 MG/1
100 CAPSULE, LIQUID FILLED ORAL 2 TIMES DAILY
Status: DISCONTINUED | OUTPATIENT
Start: 2022-01-01 | End: 2022-01-01 | Stop reason: HOSPADM

## 2022-01-01 RX ORDER — ONDANSETRON 4 MG/1
4 TABLET, ORALLY DISINTEGRATING ORAL EVERY 8 HOURS PRN
Qty: 21 TABLET | Refills: 0 | Status: SHIPPED | OUTPATIENT
Start: 2022-01-01 | End: 2022-01-01

## 2022-01-01 RX ORDER — ONDANSETRON 2 MG/ML
4 INJECTION INTRAMUSCULAR; INTRAVENOUS EVERY 6 HOURS PRN
Status: DISCONTINUED | OUTPATIENT
Start: 2022-01-01 | End: 2022-01-01 | Stop reason: HOSPADM

## 2022-01-01 RX ORDER — IPRATROPIUM BROMIDE AND ALBUTEROL SULFATE 2.5; .5 MG/3ML; MG/3ML
3 SOLUTION RESPIRATORY (INHALATION) EVERY 6 HOURS PRN
Status: DISCONTINUED | OUTPATIENT
Start: 2022-01-01 | End: 2022-01-01 | Stop reason: HOSPADM

## 2022-01-01 RX ORDER — IOPAMIDOL 755 MG/ML
100 INJECTION, SOLUTION INTRAVASCULAR ONCE
Status: COMPLETED | OUTPATIENT
Start: 2022-01-01 | End: 2022-01-01

## 2022-01-01 RX ORDER — GABAPENTIN 300 MG/1
300 CAPSULE ORAL DAILY PRN
COMMUNITY

## 2022-01-01 RX ORDER — ARIPIPRAZOLE 15 MG/1
30 TABLET ORAL DAILY
Status: DISCONTINUED | OUTPATIENT
Start: 2022-01-01 | End: 2022-01-01 | Stop reason: HOSPADM

## 2022-01-01 RX ORDER — PREDNISONE 20 MG/1
40 TABLET ORAL DAILY
Status: DISCONTINUED | OUTPATIENT
Start: 2022-01-01 | End: 2022-01-01 | Stop reason: HOSPADM

## 2022-01-01 RX ORDER — PANTOPRAZOLE SODIUM 40 MG/1
40 TABLET, DELAYED RELEASE ORAL
Status: DISCONTINUED | OUTPATIENT
Start: 2022-01-01 | End: 2022-01-01

## 2022-01-01 RX ORDER — BARIUM SULFATE 400 MG/ML
SUSPENSION ORAL ONCE
Status: COMPLETED | OUTPATIENT
Start: 2022-01-01 | End: 2022-01-01

## 2022-01-01 RX ORDER — LANSOPRAZOLE 30 MG/1
30 CAPSULE, DELAYED RELEASE ORAL 2 TIMES DAILY
Status: DISCONTINUED | OUTPATIENT
Start: 2022-01-01 | End: 2022-01-01

## 2022-01-01 RX ORDER — MONTELUKAST SODIUM 10 MG/1
10 TABLET ORAL AT BEDTIME
Status: DISCONTINUED | OUTPATIENT
Start: 2022-01-01 | End: 2022-01-01 | Stop reason: HOSPADM

## 2022-01-01 RX ORDER — OLANZAPINE 2.5 MG/1
5 TABLET, FILM COATED ORAL DAILY PRN
Status: DISCONTINUED | OUTPATIENT
Start: 2022-01-01 | End: 2022-01-01 | Stop reason: HOSPADM

## 2022-01-01 RX ORDER — OMEPRAZOLE 40 MG/1
40 CAPSULE, DELAYED RELEASE ORAL 2 TIMES DAILY
Qty: 180 CAPSULE | Refills: 0 | Status: SHIPPED | OUTPATIENT
Start: 2022-01-01 | End: 2023-01-01

## 2022-01-01 RX ORDER — ACETAMINOPHEN 500 MG
500-1000 TABLET ORAL EVERY 6 HOURS PRN
Qty: 50 TABLET | Refills: 0 | Status: SHIPPED | OUTPATIENT
Start: 2022-01-01 | End: 2022-01-01

## 2022-01-01 RX ORDER — ACETAMINOPHEN 650 MG/1
650 SUPPOSITORY RECTAL EVERY 6 HOURS PRN
Status: DISCONTINUED | OUTPATIENT
Start: 2022-01-01 | End: 2022-01-01 | Stop reason: HOSPADM

## 2022-01-01 RX ORDER — PANTOPRAZOLE SODIUM 40 MG/1
40 TABLET, DELAYED RELEASE ORAL DAILY
Status: DISCONTINUED | OUTPATIENT
Start: 2022-01-01 | End: 2022-01-01

## 2022-01-01 RX ORDER — SERTRALINE HYDROCHLORIDE 100 MG/1
200 TABLET, FILM COATED ORAL AT BEDTIME
Status: DISCONTINUED | OUTPATIENT
Start: 2022-01-01 | End: 2022-01-01

## 2022-01-01 RX ORDER — OMEPRAZOLE 40 MG/1
40 CAPSULE, DELAYED RELEASE ORAL 2 TIMES DAILY
Qty: 180 CAPSULE | Refills: 0 | Status: SHIPPED | OUTPATIENT
Start: 2022-01-01 | End: 2022-01-01

## 2022-01-01 RX ORDER — ENOXAPARIN SODIUM 100 MG/ML
40 INJECTION SUBCUTANEOUS EVERY 24 HOURS
Status: DISCONTINUED | OUTPATIENT
Start: 2022-01-01 | End: 2022-01-01 | Stop reason: HOSPADM

## 2022-01-01 RX ORDER — IPRATROPIUM BROMIDE AND ALBUTEROL SULFATE 2.5; .5 MG/3ML; MG/3ML
3 SOLUTION RESPIRATORY (INHALATION) ONCE
Status: COMPLETED | OUTPATIENT
Start: 2022-01-01 | End: 2022-01-01

## 2022-01-01 RX ORDER — LIDOCAINE 40 MG/G
CREAM TOPICAL
Status: DISCONTINUED | OUTPATIENT
Start: 2022-01-01 | End: 2022-01-01 | Stop reason: HOSPADM

## 2022-01-01 RX ORDER — PIPERACILLIN SODIUM, TAZOBACTAM SODIUM 3; .375 G/15ML; G/15ML
3.38 INJECTION, POWDER, LYOPHILIZED, FOR SOLUTION INTRAVENOUS EVERY 8 HOURS
Status: DISCONTINUED | OUTPATIENT
Start: 2022-01-01 | End: 2022-01-01

## 2022-01-01 RX ORDER — DICYCLOMINE HCL 20 MG
20 TABLET ORAL ONCE
Status: COMPLETED | OUTPATIENT
Start: 2022-01-01 | End: 2022-01-01

## 2022-01-01 RX ORDER — PIPERACILLIN SODIUM, TAZOBACTAM SODIUM 3; .375 G/15ML; G/15ML
3.38 INJECTION, POWDER, LYOPHILIZED, FOR SOLUTION INTRAVENOUS ONCE
Status: COMPLETED | OUTPATIENT
Start: 2022-01-01 | End: 2022-01-01

## 2022-01-01 RX ORDER — ONDANSETRON 2 MG/ML
4 INJECTION INTRAMUSCULAR; INTRAVENOUS ONCE
Status: COMPLETED | OUTPATIENT
Start: 2022-01-01 | End: 2022-01-01

## 2022-01-01 RX ORDER — KETOROLAC TROMETHAMINE 15 MG/ML
15 INJECTION, SOLUTION INTRAMUSCULAR; INTRAVENOUS ONCE
Status: COMPLETED | OUTPATIENT
Start: 2022-01-01 | End: 2022-01-01

## 2022-01-01 RX ORDER — ACETAMINOPHEN 325 MG/1
650 TABLET ORAL EVERY 6 HOURS PRN
Status: DISCONTINUED | OUTPATIENT
Start: 2022-01-01 | End: 2022-01-01 | Stop reason: HOSPADM

## 2022-01-01 RX ORDER — POLYETHYLENE GLYCOL 3350 17 G/17G
17 POWDER, FOR SOLUTION ORAL DAILY
Status: DISCONTINUED | OUTPATIENT
Start: 2022-01-01 | End: 2022-01-01 | Stop reason: HOSPADM

## 2022-01-01 RX ORDER — LIDOCAINE 40 MG/G
CREAM TOPICAL
Status: DISCONTINUED | OUTPATIENT
Start: 2022-01-01 | End: 2022-01-01

## 2022-01-01 RX ORDER — ARIPIPRAZOLE 15 MG/1
30 TABLET ORAL DAILY
Status: DISCONTINUED | OUTPATIENT
Start: 2022-01-01 | End: 2022-01-01

## 2022-01-01 RX ORDER — ONDANSETRON 4 MG/1
4 TABLET, ORALLY DISINTEGRATING ORAL EVERY 6 HOURS PRN
Status: DISCONTINUED | OUTPATIENT
Start: 2022-01-01 | End: 2022-01-01 | Stop reason: HOSPADM

## 2022-01-01 RX ORDER — CEFDINIR 300 MG/1
300 CAPSULE ORAL 2 TIMES DAILY
Qty: 20 CAPSULE | Refills: 0 | Status: SHIPPED | OUTPATIENT
Start: 2022-01-01 | End: 2022-01-01

## 2022-01-01 RX ORDER — DIVALPROEX SODIUM 500 MG/1
2000 TABLET, DELAYED RELEASE ORAL AT BEDTIME
Status: DISCONTINUED | OUTPATIENT
Start: 2022-01-01 | End: 2022-01-01 | Stop reason: HOSPADM

## 2022-01-01 RX ORDER — IPRATROPIUM BROMIDE AND ALBUTEROL SULFATE 2.5; .5 MG/3ML; MG/3ML
3 SOLUTION RESPIRATORY (INHALATION)
Status: DISCONTINUED | OUTPATIENT
Start: 2022-01-01 | End: 2022-01-01

## 2022-01-01 RX ADMIN — SERTRALINE HYDROCHLORIDE 200 MG: 100 TABLET ORAL at 21:00

## 2022-01-01 RX ADMIN — FLUTICASONE FUROATE 1 PUFF: 100 POWDER RESPIRATORY (INHALATION) at 20:57

## 2022-01-01 RX ADMIN — OXYBUTYNIN CHLORIDE 15 MG: 10 TABLET, EXTENDED RELEASE ORAL at 08:32

## 2022-01-01 RX ADMIN — DEXAMETHASONE SODIUM PHOSPHATE 10 MG: 10 INJECTION INTRAMUSCULAR; INTRAVENOUS at 02:47

## 2022-01-01 RX ADMIN — IPRATROPIUM BROMIDE AND ALBUTEROL SULFATE 3 ML: .5; 3 SOLUTION RESPIRATORY (INHALATION) at 12:01

## 2022-01-01 RX ADMIN — IPRATROPIUM BROMIDE AND ALBUTEROL SULFATE 3 ML: .5; 3 SOLUTION RESPIRATORY (INHALATION) at 12:10

## 2022-01-01 RX ADMIN — FLUTICASONE FUROATE 1 PUFF: 100 POWDER RESPIRATORY (INHALATION) at 19:18

## 2022-01-01 RX ADMIN — BARIUM SULFATE 60 ML: 400 SUSPENSION ORAL at 11:54

## 2022-01-01 RX ADMIN — PIPERACILLIN AND TAZOBACTAM 3.38 G: 3; .375 INJECTION, POWDER, LYOPHILIZED, FOR SOLUTION INTRAVENOUS at 18:32

## 2022-01-01 RX ADMIN — ARIPIPRAZOLE 30 MG: 15 TABLET ORAL at 08:33

## 2022-01-01 RX ADMIN — PREDNISONE 40 MG: 20 TABLET ORAL at 08:01

## 2022-01-01 RX ADMIN — SERTRALINE HYDROCHLORIDE 200 MG: 100 TABLET ORAL at 19:26

## 2022-01-01 RX ADMIN — FLUTICASONE FUROATE 1 PUFF: 100 POWDER RESPIRATORY (INHALATION) at 08:34

## 2022-01-01 RX ADMIN — POLYETHYLENE GLYCOL 3350 17 G: 17 POWDER, FOR SOLUTION ORAL at 08:36

## 2022-01-01 RX ADMIN — DOCUSATE SODIUM 100 MG: 100 CAPSULE ORAL at 08:33

## 2022-01-01 RX ADMIN — MONTELUKAST SODIUM 10 MG: 10 TABLET, COATED ORAL at 21:00

## 2022-01-01 RX ADMIN — PIPERACILLIN AND TAZOBACTAM 3.38 G: 3; .375 INJECTION, POWDER, LYOPHILIZED, FOR SOLUTION INTRAVENOUS at 04:08

## 2022-01-01 RX ADMIN — AMOXICILLIN AND CLAVULANATE POTASSIUM 1 TABLET: 875; 125 TABLET, FILM COATED ORAL at 08:31

## 2022-01-01 RX ADMIN — BARIUM SULFATE 20 ML: 400 SUSPENSION ORAL at 11:53

## 2022-01-01 RX ADMIN — DOCUSATE SODIUM 100 MG: 100 CAPSULE ORAL at 08:24

## 2022-01-01 RX ADMIN — DOCUSATE SODIUM 100 MG: 100 CAPSULE ORAL at 08:01

## 2022-01-01 RX ADMIN — FLUTICASONE FUROATE 1 PUFF: 100 POWDER RESPIRATORY (INHALATION) at 08:09

## 2022-01-01 RX ADMIN — ENOXAPARIN SODIUM 40 MG: 40 INJECTION SUBCUTANEOUS at 08:24

## 2022-01-01 RX ADMIN — PREDNISONE 40 MG: 20 TABLET ORAL at 08:10

## 2022-01-01 RX ADMIN — OLANZAPINE 5 MG: 2.5 TABLET, FILM COATED ORAL at 10:15

## 2022-01-01 RX ADMIN — FLUTICASONE FUROATE 1 PUFF: 100 POWDER RESPIRATORY (INHALATION) at 08:01

## 2022-01-01 RX ADMIN — IOPAMIDOL 100 ML: 755 INJECTION, SOLUTION INTRAVENOUS at 09:55

## 2022-01-01 RX ADMIN — ACETAMINOPHEN 650 MG: 325 TABLET, FILM COATED ORAL at 12:08

## 2022-01-01 RX ADMIN — PIPERACILLIN AND TAZOBACTAM 3.38 G: 3; .375 INJECTION, POWDER, LYOPHILIZED, FOR SOLUTION INTRAVENOUS at 02:33

## 2022-01-01 RX ADMIN — OLANZAPINE 5 MG: 2.5 TABLET, FILM COATED ORAL at 19:17

## 2022-01-01 RX ADMIN — DOCUSATE SODIUM 100 MG: 100 CAPSULE ORAL at 19:17

## 2022-01-01 RX ADMIN — PIPERACILLIN AND TAZOBACTAM 3.38 G: 3; .375 INJECTION, POWDER, LYOPHILIZED, FOR SOLUTION INTRAVENOUS at 09:31

## 2022-01-01 RX ADMIN — PIPERACILLIN AND TAZOBACTAM 3.38 G: 3; .375 INJECTION, POWDER, LYOPHILIZED, FOR SOLUTION INTRAVENOUS at 12:28

## 2022-01-01 RX ADMIN — OMEPRAZOLE 40 MG: 20 CAPSULE, DELAYED RELEASE ORAL at 19:17

## 2022-01-01 RX ADMIN — ARIPIPRAZOLE 30 MG: 15 TABLET ORAL at 08:01

## 2022-01-01 RX ADMIN — IPRATROPIUM BROMIDE AND ALBUTEROL SULFATE 3 ML: .5; 3 SOLUTION RESPIRATORY (INHALATION) at 07:45

## 2022-01-01 RX ADMIN — DOCUSATE SODIUM 100 MG: 100 CAPSULE ORAL at 20:56

## 2022-01-01 RX ADMIN — LIDOCAINE HYDROCHLORIDE 3 ML: 10 INJECTION, SOLUTION EPIDURAL; INFILTRATION; INTRACAUDAL; PERINEURAL at 07:20

## 2022-01-01 RX ADMIN — DIVALPROEX SODIUM 2000 MG: 500 TABLET, DELAYED RELEASE ORAL at 19:19

## 2022-01-01 RX ADMIN — IPRATROPIUM BROMIDE AND ALBUTEROL SULFATE 3 ML: .5; 3 SOLUTION RESPIRATORY (INHALATION) at 15:35

## 2022-01-01 RX ADMIN — ACETAMINOPHEN 650 MG: 325 TABLET, FILM COATED ORAL at 04:57

## 2022-01-01 RX ADMIN — IOPAMIDOL 100 ML: 755 INJECTION, SOLUTION INTRAVENOUS at 23:37

## 2022-01-01 RX ADMIN — KETOROLAC TROMETHAMINE 15 MG: 15 INJECTION, SOLUTION INTRAMUSCULAR; INTRAVENOUS at 21:00

## 2022-01-01 RX ADMIN — IPRATROPIUM BROMIDE AND ALBUTEROL SULFATE 3 ML: .5; 3 SOLUTION RESPIRATORY (INHALATION) at 19:54

## 2022-01-01 RX ADMIN — OXYBUTYNIN CHLORIDE 15 MG: 10 TABLET, EXTENDED RELEASE ORAL at 08:00

## 2022-01-01 RX ADMIN — ARIPIPRAZOLE 30 MG: 15 TABLET ORAL at 08:09

## 2022-01-01 RX ADMIN — ALBUTEROL SULFATE 2 PUFF: 90 AEROSOL, METERED RESPIRATORY (INHALATION) at 11:26

## 2022-01-01 RX ADMIN — AMOXICILLIN AND CLAVULANATE POTASSIUM 1 TABLET: 875; 125 TABLET, FILM COATED ORAL at 19:17

## 2022-01-01 RX ADMIN — MONTELUKAST SODIUM 10 MG: 10 TABLET, COATED ORAL at 19:26

## 2022-01-01 RX ADMIN — PERFLUTREN 3 ML: 6.52 INJECTION, SUSPENSION INTRAVENOUS at 15:20

## 2022-01-01 RX ADMIN — PREDNISONE 40 MG: 20 TABLET ORAL at 08:31

## 2022-01-01 RX ADMIN — POLYETHYLENE GLYCOL 3350 17 G: 17 POWDER, FOR SOLUTION ORAL at 08:00

## 2022-01-01 RX ADMIN — ACETAMINOPHEN 650 MG: 325 TABLET, FILM COATED ORAL at 20:30

## 2022-01-01 RX ADMIN — PANTOPRAZOLE SODIUM 40 MG: 40 TABLET, DELAYED RELEASE ORAL at 08:24

## 2022-01-01 RX ADMIN — PANTOPRAZOLE SODIUM 40 MG: 40 TABLET, DELAYED RELEASE ORAL at 17:34

## 2022-01-01 RX ADMIN — OMEPRAZOLE 40 MG: 20 CAPSULE, DELAYED RELEASE ORAL at 08:31

## 2022-01-01 RX ADMIN — OMEPRAZOLE 40 MG: 20 CAPSULE, DELAYED RELEASE ORAL at 11:13

## 2022-01-01 RX ADMIN — SODIUM CHLORIDE 1000 ML: 9 INJECTION, SOLUTION INTRAVENOUS at 08:08

## 2022-01-01 RX ADMIN — BARIUM SULFATE 60 ML: 400 SUSPENSION ORAL at 10:28

## 2022-01-01 RX ADMIN — IOPAMIDOL 100 ML: 755 INJECTION, SOLUTION INTRAVENOUS at 14:57

## 2022-01-01 RX ADMIN — ENOXAPARIN SODIUM 40 MG: 40 INJECTION SUBCUTANEOUS at 09:32

## 2022-01-01 RX ADMIN — ANTACID/ANTIFLATULENT 4 G: 380; 550; 10; 10 GRANULE, EFFERVESCENT ORAL at 12:01

## 2022-01-01 RX ADMIN — DIVALPROEX SODIUM 2000 MG: 500 TABLET, DELAYED RELEASE ORAL at 21:00

## 2022-01-01 RX ADMIN — IOPAMIDOL 100 ML: 755 INJECTION, SOLUTION INTRAVENOUS at 08:15

## 2022-01-01 RX ADMIN — IPRATROPIUM BROMIDE AND ALBUTEROL SULFATE 3 ML: .5; 3 SOLUTION RESPIRATORY (INHALATION) at 08:09

## 2022-01-01 RX ADMIN — SODIUM CHLORIDE 500 ML: 9 INJECTION, SOLUTION INTRAVENOUS at 21:00

## 2022-01-01 RX ADMIN — ONDANSETRON 4 MG: 2 INJECTION INTRAMUSCULAR; INTRAVENOUS at 09:33

## 2022-01-01 RX ADMIN — IPRATROPIUM BROMIDE AND ALBUTEROL SULFATE 3 ML: .5; 3 SOLUTION RESPIRATORY (INHALATION) at 02:24

## 2022-01-01 RX ADMIN — DICYCLOMINE HYDROCHLORIDE 20 MG: 20 TABLET ORAL at 08:06

## 2022-01-01 RX ADMIN — PANTOPRAZOLE SODIUM 40 MG: 40 TABLET, DELAYED RELEASE ORAL at 06:39

## 2022-01-01 RX ADMIN — IPRATROPIUM BROMIDE AND ALBUTEROL SULFATE 3 ML: .5; 3 SOLUTION RESPIRATORY (INHALATION) at 09:49

## 2022-01-01 RX ADMIN — ALUMINUM HYDROXIDE, MAGNESIUM HYDROXIDE, AND DIMETHICONE 30 ML: 200; 20; 200 SUSPENSION ORAL at 13:35

## 2022-01-01 RX ADMIN — ENOXAPARIN SODIUM 40 MG: 40 INJECTION SUBCUTANEOUS at 08:40

## 2022-01-01 RX ADMIN — ACETAMINOPHEN 650 MG: 325 TABLET, FILM COATED ORAL at 21:13

## 2022-01-01 RX ADMIN — OXYBUTYNIN CHLORIDE 15 MG: 10 TABLET, EXTENDED RELEASE ORAL at 08:09

## 2022-01-01 RX ADMIN — ACETAMINOPHEN 650 MG: 325 TABLET, FILM COATED ORAL at 06:34

## 2022-01-01 ASSESSMENT — ENCOUNTER SYMPTOMS
CONSTIPATION: 1
CHILLS: 0
ABDOMINAL PAIN: 1
VOMITING: 0
DIARRHEA: 1
SHORTNESS OF BREATH: 0
COUGH: 1
NAUSEA: 0
DYSURIA: 1
CONFUSION: 0
WEAKNESS: 0
CONSTIPATION: 1
CHILLS: 0
VOMITING: 0
HEMATURIA: 0
DIARRHEA: 1
NAUSEA: 0
NAUSEA: 1
SORE THROAT: 0
NUMBNESS: 0
BLOOD IN STOOL: 0
JOINT SWELLING: 0
BLOOD IN STOOL: 1
CHEST TIGHTNESS: 0
DYSURIA: 0
CHILLS: 0
DYSURIA: 1
ABDOMINAL PAIN: 1
BLOOD IN STOOL: 1
VOMITING: 0
SHORTNESS OF BREATH: 0
VOMITING: 0
FEVER: 0
ANAL BLEEDING: 0
COUGH: 0
SHORTNESS OF BREATH: 0
FREQUENCY: 0
DIZZINESS: 0
HEMATURIA: 0
FATIGUE: 0
DIARRHEA: 1
FEVER: 0
FEVER: 0
DIARRHEA: 0
NAUSEA: 0
ABDOMINAL PAIN: 1
HEADACHES: 0
DIZZINESS: 0
FREQUENCY: 1
FEVER: 0
ABDOMINAL PAIN: 1
HEMATURIA: 0

## 2022-01-01 ASSESSMENT — ACTIVITIES OF DAILY LIVING (ADL)
ADLS_ACUITY_SCORE: 37
ADLS_ACUITY_SCORE: 20
CONCENTRATING,_REMEMBERING_OR_MAKING_DECISIONS_DIFFICULTY: NO
ADLS_ACUITY_SCORE: 35
ADLS_ACUITY_SCORE: 20
DOING_ERRANDS_INDEPENDENTLY_DIFFICULTY: NO
ADLS_ACUITY_SCORE: 20
ADLS_ACUITY_SCORE: 35
ADLS_ACUITY_SCORE: 20
WEAR_GLASSES_OR_BLIND: NO
ADLS_ACUITY_SCORE: 20
ADLS_ACUITY_SCORE: 35
WALKING_OR_CLIMBING_STAIRS_DIFFICULTY: NO
TOILETING_ISSUES: NO
ADLS_ACUITY_SCORE: 35
ADLS_ACUITY_SCORE: 37
ADLS_ACUITY_SCORE: 35
ADLS_ACUITY_SCORE: 35
ADLS_ACUITY_SCORE: 37
ADLS_ACUITY_SCORE: 37
DIFFICULTY_EATING/SWALLOWING: NO
ADLS_ACUITY_SCORE: 35
ADLS_ACUITY_SCORE: 20
ADLS_ACUITY_SCORE: 35
ADLS_ACUITY_SCORE: 20
ADLS_ACUITY_SCORE: 20
DRESSING/BATHING_DIFFICULTY: NO
FALL_HISTORY_WITHIN_LAST_SIX_MONTHS: NO
ADLS_ACUITY_SCORE: 35
ADLS_ACUITY_SCORE: 37
ADLS_ACUITY_SCORE: 37
ADLS_ACUITY_SCORE: 35
ADLS_ACUITY_SCORE: 35
ADLS_ACUITY_SCORE: 37
ADLS_ACUITY_SCORE: 37
CHANGE_IN_FUNCTIONAL_STATUS_SINCE_ONSET_OF_CURRENT_ILLNESS/INJURY: NO
ADLS_ACUITY_SCORE: 37
ADLS_ACUITY_SCORE: 20

## 2022-09-06 PROBLEM — K44.9 PARAESOPHAGEAL HERNIA: Status: ACTIVE | Noted: 2021-05-20

## 2022-09-06 PROBLEM — J18.9 COMMUNITY ACQUIRED PNEUMONIA OF RIGHT LOWER LOBE OF LUNG: Status: ACTIVE | Noted: 2020-08-22

## 2022-09-06 PROBLEM — R45.89 SUICIDAL BEHAVIOR: Status: ACTIVE | Noted: 2021-05-20

## 2022-09-06 PROBLEM — R07.9 ACUTE CHEST PAIN: Status: ACTIVE | Noted: 2021-09-21

## 2022-09-06 PROBLEM — F43.10 POSTTRAUMATIC STRESS DISORDER: Status: ACTIVE | Noted: 2021-05-20

## 2022-09-06 PROBLEM — J96.01 ACUTE RESPIRATORY FAILURE WITH HYPOXIA (H): Status: ACTIVE | Noted: 2021-09-21

## 2022-09-06 PROBLEM — Z30.42 ENCOUNTER FOR SURVEILLANCE OF INJECTABLE CONTRACEPTIVE: Status: ACTIVE | Noted: 2021-05-20

## 2022-09-06 PROBLEM — J96.01 SEPSIS WITH ACUTE HYPOXIC RESPIRATORY FAILURE WITHOUT SEPTIC SHOCK, DUE TO UNSPECIFIED ORGANISM (H): Status: ACTIVE | Noted: 2022-01-01

## 2022-09-06 PROBLEM — K21.9 GERD (GASTROESOPHAGEAL REFLUX DISEASE): Status: ACTIVE | Noted: 2021-05-20

## 2022-09-06 PROBLEM — R65.20 SEPSIS WITH ACUTE HYPOXIC RESPIRATORY FAILURE WITHOUT SEPTIC SHOCK, DUE TO UNSPECIFIED ORGANISM (H): Status: ACTIVE | Noted: 2022-01-01

## 2022-09-06 PROBLEM — F60.89 CLUSTER B PERSONALITY DISORDER (H): Status: ACTIVE | Noted: 2021-05-20

## 2022-09-06 PROBLEM — N39.44 NOCTURNAL ENURESIS: Status: ACTIVE | Noted: 2021-05-20

## 2022-09-06 PROBLEM — N17.9 ACUTE KIDNEY INJURY (H): Status: ACTIVE | Noted: 2022-01-01

## 2022-09-06 PROBLEM — R73.03 PREDIABETES: Status: ACTIVE | Noted: 2021-05-20

## 2022-09-06 PROBLEM — F43.25 MIXED DISTURBANCE OF EMOTIONS AND CONDUCT AS ADJUSTMENT REACTION: Status: ACTIVE | Noted: 2021-05-20

## 2022-09-06 PROBLEM — A41.9 SEPSIS WITH ACUTE HYPOXIC RESPIRATORY FAILURE WITHOUT SEPTIC SHOCK, DUE TO UNSPECIFIED ORGANISM (H): Status: ACTIVE | Noted: 2022-01-01

## 2022-09-06 PROBLEM — E66.01 MORBID OBESITY (H): Status: ACTIVE | Noted: 2017-05-09

## 2022-09-06 PROBLEM — J45.21 MILD INTERMITTENT ASTHMA WITH ACUTE EXACERBATION: Status: ACTIVE | Noted: 2017-03-30

## 2022-09-06 PROBLEM — N17.9 ACUTE RENAL FAILURE SYNDROME (H): Status: ACTIVE | Noted: 2021-05-20

## 2022-09-06 PROBLEM — R32 URINARY INCONTINENCE: Status: ACTIVE | Noted: 2017-05-09

## 2022-09-06 PROBLEM — J45.40 MODERATE PERSISTENT ASTHMA WITHOUT COMPLICATION: Status: ACTIVE | Noted: 2021-05-20

## 2022-09-06 PROBLEM — G47.30 SLEEP APNEA: Status: ACTIVE | Noted: 2021-05-20

## 2022-09-06 PROBLEM — F33.1 MAJOR DEPRESSIVE DISORDER, RECURRENT, MODERATE (H): Status: ACTIVE | Noted: 2017-06-19

## 2022-09-06 NOTE — ED TRIAGE NOTES
Patient states she is having abdominal pain, mid abdomen, that is sharp. Patient states pain started yesterday. Patient states she likes to eat junk food. Patient also states she has diarrhea.

## 2022-09-06 NOTE — ED NOTES
"ED Provider In Triage Note  [facility]  Encounter Date: Sep 6, 2022    Chief Complaint   Patient presents with     Abdominal Pain       Brief HPI:   Michelle Person is a 32 year old female presenting to the Emergency Department with a chief complaint of abd pain. Diarrhea. Pain started yesterday. No fever. Denies being pregnant.     Pain is 5/10.    Brief Physical Exam:  /79   Pulse 84   Temp 99  F (37.2  C) (Temporal)   Resp 16   Ht 1.549 m (5' 1\")   Wt 83.6 kg (184 lb 4.8 oz)   SpO2 100%   BMI 34.82 kg/m    General: Non-toxic appearing  HEENT: Atraumatic  Resp: No respiratory distress  Abdomen: Non-peritoneal, but tenderness to RUQ and RLQ, diffuse guarding  Neuro: Alert, oriented, answers questions appropriately  Psych: Behavior appropriate      Plan Initiated in Triage:  Ddx includes: appendicitis, colitis, cholecystitis, UTI    PIT Dispo:   Return to lobby while awaiting workup and ED bed availability    Regan Mendoza MD on 9/6/2022 at 6:50 PM    Patient was evaluated by the Physician in Triage due to a limitation of available rooms in the Emergency Department. A plan of care was discussed based on the information obtained on the initial evaluation and patient was consuled to return back to the Emergency Department lobby after this initial evalutaiton until results were obtained or a room became available in the Emergency Department. Patient was counseled not to leave prior to receiving the results of their workup.     Regan Mendoza MD  [facility] EMERGENCY DEPARTMENT  [address] [place] [id number]  [phone]       Regan Mendoza MD  09/06/22 1857    "

## 2022-09-07 NOTE — DISCHARGE INSTRUCTIONS
Your blood work was generally reassuring.  We will notify you of the urine culture grows bacteria that needs treatment.  Your scan showed's several left ovarian cysts for which you should follow-up with in clinic.  It also showed findings of a possible ileitis. We would like you to turn in a stool sample with lab to test your stool for infection. It is important that you follow up closely with your primary care doctor in clinic.    Return to the Emergency Room if you have significant worsening abdominal pain, increased blood in the stool, fevers or any other worsening symptoms or concerns.

## 2022-09-07 NOTE — ED PROVIDER NOTES
NAME: Michelle Person  AGE: 32 year old female  YOB: 1990  MRN: [id number]  EVALUATION DATE & TIME: No admission date for patient encounter.    PCP: Radha Torres  ED PROVIDER: Danelle Rubio MD.    Chief Complaint   Patient presents with     Abdominal Pain     FINAL IMPRESSION:  1. Abdominal pain, generalized    2. Left ovarian cyst        MEDICAL DECISION MAKIN:56 PM I met with the patient, obtained history, performed an initial exam, and discussed options and plan for diagnostics and treatment here in the ED. PPE worn: surgical mask and nitrile gloves.  12:16 AM Results were communicated with the patient.   12:26 AM Care discussed with BETTINA Paul.     MDM: 32 year old female presents to the Emergency Department for evaluation of abdominal pain. Vitals are reassuring. Pain started last 1-2 days. Initially stated it was more on the right side but then later said more diffuse. Dx includes but not limited to gastric reflux, ulcer, pancreatitis, appendicitis, cholelithiasis, cholecystitis, diverticulitis, PID (unlikely no fevers or abnormal discharge), ovarian torsion, ovarian cyst, hernia among others.     Her work-up was started in triage and she was given Toradol with improvement of her symptoms.  Her lab work is generally reassuring.  Her UA shows positive leuk esterase and 13 white blood cells, negative nitrites.  Plan to hold on treatment and send urine culture.  CT of her abdomen pelvis is limited due to motion however findings of possible ileitis are present as well as multiple left adnexal cyst.  Discussed with [place] MARILIN and will need stool cultures and close outpatient follow-up with her primary care doctor.  Regarding the left adnexal cyst, she has no LLQ tenderness during any of my exams and I have low suspicion for torsion or that this is the cause of her pain--discussed that will need an ultrasound to further evaluate this.  The importance of close follow-up was  "discussed.  Strict return precautions discussed and patient is in agreement with plan, endorses understanding and their questions were answered.    MEDICATIONS GIVEN IN THE EMERGENCY:  Medications   ketorolac (TORADOL) injection 15 mg (15 mg Intravenous Given 9/6/22 2100)   0.9% sodium chloride BOLUS (0 mLs Intravenous Stopped 9/7/22 0035)   iopamidol (ISOVUE-370) solution 100 mL (100 mLs Intravenous Given 9/6/22 2337)       NEW PRESCRIPTIONS STARTED AT TODAY'S ER VISIT:  Discharge Medication List as of 9/7/2022 12:35 AM           =================================================================  HPI    Patient information was obtained from: patient and group home staff  Use of : N/A       Michelle Person is a 32 year old female with a past medical history of depression, anxiety, asthma, MARY, prediabetes, sepsis, GERD, who presents abdominal pain, diarrhea.    Patient here from group home where she initially developed generalized abdominal pain since yesterday. She states that the right side of her abdomen is worst. Her group member staff does state that patient had informed her of symptoms today since she was eating junk food she was not supposed to. No prior abdominal surgeries. She does have some mild diarrhea with some blood the other day but none today. No nausea or vomiting. No concerns for pregnancy. She does endorse some dysuria as well which she says it \"stings\" when she urinates. No hematuria or increased frequency. No abnormal vaginal discharge. No fever, chills or cough. No other medical complaints at this time.       REVIEW OF SYSTEMS   Review of Systems   Constitutional: Negative for chills and fever.   Respiratory: Negative for cough and shortness of breath.    Cardiovascular: Negative for chest pain.   Gastrointestinal: Positive for abdominal pain (right sided), blood in stool (resolved) and diarrhea. Negative for nausea and vomiting.   Genitourinary: Positive for dysuria. Negative for " frequency, hematuria and vaginal discharge.   All other systems reviewed and are negative.       PAST MEDICAL HISTORY:  Past Medical History:   Diagnosis Date     Asthma      Depression      Morbid obesity (H)      MR (mental retardation)      Psychosis (H)      Seizures (H)      Sleep apnea     been refusing to wear cpap machine at group home     Suicidal ideation      Suicide attempt (H)        PAST SURGICAL HISTORY:  Past Surgical History:   Procedure Laterality Date     CARDIAC SURGERY      pt reports as an infant, unable to obtain records     HC PELVIC EXAMINATION W ANESTH N/A 12/13/2018    Procedure: EXAM UNDER ANESTHESIA,  DRAINAGE OF ABCESS;  Surgeon: Caryn Ortiz MD;  [place] [facility] [place]  Service: Gynecology     PICC MIDLINE INSERTION  8/23/2020          WISDOM TOOTH EXTRACTION         CURRENT MEDICATIONS:    No current facility-administered medications for this encounter.    Current Outpatient Medications:      albuterol (PROAIR HFA) 108 (90 Base) MCG/ACT inhaler, Inhale 2 puffs into the lungs daily as needed for wheezing, Disp: 1 Inhaler, Rfl: 0     ARIPiprazole (ABILIFY) 30 MG tablet, Take 1 tablet (30 mg) by mouth daily, Disp: 30 tablet, Rfl: 0     cephALEXin (KEFLEX) 500 MG capsule, Take 1 capsule (500 mg) by mouth 3 times daily, Disp: 15 capsule, Rfl: 0     divalproex sodium delayed-release (DEPAKOTE) 500 MG DR tablet, Take 4 tablets (2,000 mg) by mouth At Bedtime, Disp: 120 tablet, Rfl: 0     docusate sodium (COLACE) 100 MG capsule, Take 1 capsule (100 mg) by mouth daily, Disp: 30 capsule, Rfl: 0     fish oil-omega-3 fatty acids 1000 MG capsule, Take 1 capsule (1 g) by mouth 3 times daily, Disp: 30 capsule, Rfl: 0     fluticasone (ARNUITY ELLIPTA) 100 MCG/ACT inhaler, Inhale 1 puff into the lungs daily, Disp: 1 each, Rfl: 0     fluticasone (FLOVENT DISKUS) 100 MCG/BLIST inhaler, Inhale 1 puff into the lungs every 12 hours, Disp: 1 Inhaler, Rfl: 0     ketoconazole (NIZORAL) 2 %  external cream, Apply 1 Application topically 2 times daily as needed Apply to skin fold, Disp: , Rfl:      medroxyPROGESTERone (DEPO-PROVERA) 150 MG/ML IM injection, Inject 1 mL (150 mg) into the muscle every 3 months Last injection was on 12/10/20 per group home, Disp: 1 mL, Rfl: 0     montelukast (SINGULAIR) 10 MG tablet, Take 1 tablet (10 mg) by mouth At Bedtime, Disp: 30 tablet, Rfl: 0     OLANZapine (ZYPREXA) 10 MG tablet, Take 1 tablet (10 mg) by mouth 3 times daily as needed (associated with psychosis or magdalene), Disp: 30 tablet, Rfl: 0     Omega-3 Fatty Acids (FISH OIL PO), Take 1 capsule by mouth 3 times daily , Disp: , Rfl:      oxybutynin ER (DITROPAN XL) 15 MG 24 hr tablet, Take 1 tablet (15 mg) by mouth daily, Disp: 30 tablet, Rfl: 0     pantoprazole (PROTONIX) 40 MG EC tablet, Take 1 tablet (40 mg) by mouth daily, Disp: 30 tablet, Rfl: 0     polyethylene glycol (MIRALAX) 17 g packet, Take 17 g by mouth daily as needed for constipation, Disp: 30 packet, Rfl: 0     SENNA-docusate sodium (SENNA S) 8.6-50 MG tablet, Take 1 tablet by mouth At Bedtime, Disp: 30 tablet, Rfl: 0     sennosides (SENOKOT) 8.6 MG tablet, Take 1 tablet by mouth 2 times daily as needed for constipation, Disp: 30 tablet, Rfl: 0     sertraline (ZOLOFT) 100 MG tablet, Take 2 tablets (200 mg) by mouth At Bedtime, Disp: 60 tablet, Rfl: 0     Vitamin D3 (CHOLECALCIFEROL) 25 mcg (1000 units) tablet, Take 1 tablet (25 mcg) by mouth daily, Disp: 30 tablet, Rfl: 0    ALLERGIES:  Allergies   Allergen Reactions     Ibuprofen Other (See Comments)     Until cleared by primary MD d/t kidney function     Tomato Unknown       FAMILY HISTORY:  Family History   Problem Relation Age of Onset     Hypertension Mother        SOCIAL HISTORY:   Social History     Socioeconomic History     Marital status: Single   Tobacco Use     Smoking status: Former Smoker     Smokeless tobacco: Never Used   Substance and Sexual Activity     Alcohol use: No     Drug use:  "No   Social History Narrative    The patient lives in a foster home.        PHYSICAL EXAM:    Vitals: BP (!) 150/88   Pulse 89   Temp 99  F (37.2  C) (Temporal)   Resp 20   Ht 1.549 m (5' 1\")   Wt 83.6 kg (184 lb 4.8 oz)   SpO2 99%   BMI 34.82 kg/m     Constitutional: Well developed, well nourished. No acute distress.  HENT: Normocephalic, atraumatic, mucous membranes moist, nose normal. Neck- Supple, gross ROM intact.   Eyes: Pupils mid-range, sclera white, no discharge  Respiratory: Clear to auscultation bilaterally, no respiratory distress, no wheezing, speaks full sentences easily.  Cardiovascular: Normal heart rate, regular rhythm, no murmurs.  GI: Soft, not distended, mild diffuse tenderness, no palpable masses, no rebound or guarding  Musculoskeletal: Moving all 4 extremities intentionally and without pain. No obvious deformity.  Skin: Warm, dry, no rash.  Neurologic: Alert & oriented x 3, speech clear, moving all extremities spontaneously     LAB:  All pertinent labs reviewed and interpreted.  Labs Ordered and Resulted from Time of ED Arrival to Time of ED Departure   ROUTINE UA WITH MICROSCOPIC REFLEX TO CULTURE - Abnormal       Result Value    Color Urine Light Yellow      Appearance Urine Clear      Glucose Urine Negative      Bilirubin Urine Negative      Ketones Urine Negative      Specific Gravity Urine 1.016      Blood Urine Negative      pH Urine 5.5      Protein Albumin Urine Negative      Urobilinogen Urine <2.0      Nitrite Urine Negative      Leukocyte Esterase Urine 250 [place]/uL (*)     Bacteria Urine Few (*)     Mucus Urine Present (*)     RBC Urine 1      WBC Urine 13 (*)     Squamous Epithelials Urine <1      Transitional Epithelials Urine 1     CBC WITH PLATELETS - Normal    WBC Count 7.8      RBC Count 4.68      Hemoglobin 13.0      Hematocrit 39.9      MCV 85      MCH 27.8      MCHC 32.6      RDW 13.4      Platelet Count 211     BASIC METABOLIC PANEL - Normal    Sodium 136      " Potassium 4.4      Chloride 101      Carbon Dioxide (CO2) 26      Anion Gap 9      Urea Nitrogen 13      Creatinine 1.08      Calcium 8.8      Glucose 98      GFR Estimate 69     HEPATIC FUNCTION PANEL - Normal    Bilirubin Total 0.2      Bilirubin Direct <0.1      Protein Total 7.3      Albumin 3.8      Alkaline Phosphatase 48      AST 18      ALT <9     LIPASE - Normal    Lipase 15     HCG QUALITATIVE PREGNANCY - Normal    hCG Serum Qualitative Negative     URINE CULTURE   ENTERIC BACTERIA AND VIRUS PANEL BY TALON STOOL   CLOSTRIDIUM DIFFICILE TOXIN B       RADIOLOGY:  CT Abdomen Pelvis w Contrast   Final Result   IMPRESSION: Markedly limited exam due to patient motion artifact.   1.  No evidence of appendicitis.   2.  Mildly dilated and fluid-filled ileum which could reflect ileitis.   3.  Multiple left adnexal cyst. Further evaluation with ultrasound.        EKG:   N/A     PROCEDURES:   None    I, Luzmaria Reyes, am serving as a scribe to document services personally performed by Dr. Danelle Rubio based on my observation and the provider's statements to me. I, Danelle Rubio MD attest that Luzmaria Reyes is acting in a scribe capacity, has observed my performance of the services and has documented them in accordance with my direction.    Danelle Rubio M.D.  Emergency Medicine  [facility] EMERGENCY DEPARTMENT  [facility] [id number] [phone]  Dept: [phone]     Danelle Rubio MD  09/07/22 0059
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
## 2022-09-13 NOTE — PATIENT INSTRUCTIONS
Switch Docusate Sodium, Senna S and Sennakot to as needed instead of every day. Stop taking it now while you're having diarrhea.   Begin taking Carafate up to 4 times daily as needed for abdominal pain. I have given a 7-day supply.  If symptoms are persisting longer than that would like to have you follow-up.  Eat clear liquid diet (broth, jello). Once pain free you can advance to thickened liquids (applesauce, shakes, smoothies), as long as you stay pain-free for the next few days of thickened liquids you can advance her diet to solids start with soft foods that do not require a lot of breakdown such as bananas and toast.  You can continue to advance your diet as tolerated.

## 2022-10-11 NOTE — ED NOTES
Here for RUQ abdominal pain. She noticed blood in her stool . She had 2 BM's Blood in stool and in her pad. Has been constipated lately. She has had a cough for a while.

## 2022-10-11 NOTE — ED NOTES
Bed: [facility]ED[facility]-03  Expected date: 10/11/22  Expected time: 7:10 AM  Means of arrival: Ambulance  Comments:  Petros 32F blood in stool

## 2022-10-11 NOTE — ED PROVIDER NOTES
Emergency Department Encounter     Evaluation Date & Time:   No admission date for patient encounter.    CHIEF COMPLAINT:  Rectal Bleeding      Triage Note:    she has blood in her stool              ED COURSE & MEDICAL DECISION MAKING:     ED Course as of 10/11/22 1519   Tue Oct 11, 2022   0807 UA unremarkable, no real blood or infection.   0913 Labs all very reassuring including normal Hgb and BUN.   1030 CTA abd/pelvis neg for any sort of acute pathology.   1055 Pt remains well on re-evaluation. Discussed results, colace BID, zofran prn, outpatient follow up.       Pt presenting from [facility] for evaluation of new onset bloody stool x 2 this morning.  Pt reports recent constipation.  Having some lower abdominal pain with minimal tenderness.  She is nontoxic with baseline flat affect.  No anticoagulation and this just started prior to arrival.  Will get labs, CTA abd/pelvis, although I strongly suspect this is constipation related pain and rectal bleeding.      7:20 AM I introduced myself to the patient, obtained patient history, performed a physical exam, and discussed plan for ED workup including potential diagnostic laboratory/imaging studies and interventions.    7:32 AM Labs ordered    8:16 AM ED Lab in process    9:05 AM Lab resulted    9:19 AM I rechecked the patient and updated them on laboratory results, CTA still to be done. She has some nausea, so zofran ordered.      9:51 AM Imaging exam started    10:17 AM CTA negative for acute pathology.    10:30 AM Rechecked and updated the patient. We discussed the plan for discharge and the patient is agreeable. Reviewed supportive cares, symptomatic treatment, outpatient follow up, and reasons to return to the Emergency Department. Patient to be discharged by ED RN.     At the conclusion of the encounter I discussed the results of all the tests and the disposition. The questions were answered. The patient or family acknowledged understanding and was  agreeable with the care plan.      MEDICATIONS GIVEN IN THE EMERGENCY DEPARTMENT:  Medications   0.9% sodium chloride BOLUS (0 mLs Intravenous Stopped 10/11/22 1003)   dicyclomine (BENTYL) tablet 20 mg (20 mg Oral Given 10/11/22 0806)   ondansetron (ZOFRAN) injection 4 mg (4 mg Intravenous Given 10/11/22 0933)   iopamidol (ISOVUE-370) solution 100 mL (100 mLs Intravenous Given 10/11/22 0955)       NEW PRESCRIPTIONS STARTED AT TODAY'S ED VISIT:  Discharge Medication List as of 10/11/2022 11:19 AM      START taking these medications    Details   !! docusate sodium (COLACE) 100 MG capsule Take 1 capsule (100 mg) by mouth 2 times daily for 7 days, Disp-14 capsule, R-0, E-Prescribe      ondansetron (ZOFRAN ODT) 4 MG ODT tab Take 1 tablet (4 mg) by mouth every 8 hours as needed for nausea, Disp-21 tablet, R-0, E-Prescribe       !! - Potential duplicate medications found. Please discuss with provider.          HPI   HPI     Michelle Person is a 32 year old female with a pertinent history of acute renal failure, GERD, and mental retardation who presents to this ED via EMS for evaluation of rectal bleeding.    The patient woke up this morning to use the bathroom when she noticed blood in her stool. She said she had 2 bowel movements this morning and both of them were episodes of diarrhea. Patient notes that she's been feeling constipated recently and also has lower abdominal pain worsening on the right side. She also says she feels burning when urinating a few minutes ago in the ED. She also notes having a bad cough that's lasted for weeks and thinks it's because she has a cold. Patient says she felt fine the last couple of days before until today.     Patient has no fever. Patient denies any blood thinners, but currently takes daily medication. Patient has no pregnancy concern. Patient lives in a group home. Patient says she has a history of leg edema.       REVIEW OF SYSTEMS:  Review of Systems   Constitutional: Negative  for chills and fever.   HENT: Negative for sore throat.    Eyes: Negative for visual disturbance.   Respiratory: Positive for cough. Negative for shortness of breath.    Cardiovascular: Negative for chest pain.   Gastrointestinal: Positive for abdominal pain (Lower abdominal pain), blood in stool and constipation. Negative for diarrhea, nausea and vomiting.   Endocrine: Negative for polyuria.   Genitourinary: Positive for dysuria (Burning). Negative for hematuria.        - urinary changes     Musculoskeletal: Negative for joint swelling.   Skin: Negative for rash.   Neurological: Negative for dizziness.   Psychiatric/Behavioral: Negative for confusion.   All other systems reviewed and are negative.          Medical History     Past Medical History:   Diagnosis Date     Asthma      Depression      Morbid obesity (H)      MR (mental retardation)      Psychosis (H)      Seizures (H)      Sleep apnea      Suicidal ideation      Suicide attempt (H)        Past Surgical History:   Procedure Laterality Date     CARDIAC SURGERY      pt reports as an infant, unable to obtain records     HC PELVIC EXAMINATION W ANESTH N/A 12/13/2018    Procedure: EXAM UNDER ANESTHESIA,  DRAINAGE OF ABCESS;  Surgeon: Caryn Ortiz MD;  Location: [facility];  Service: Gynecology     PICC MIDLINE INSERTION  8/23/2020          WISDOM TOOTH EXTRACTION         Family History   Problem Relation Age of Onset     Hypertension Mother        Social History     Tobacco Use     Smoking status: Former     Smokeless tobacco: Never   Substance Use Topics     Alcohol use: No     Drug use: No       docusate sodium (COLACE) 100 MG capsule  ondansetron (ZOFRAN ODT) 4 MG ODT tab  acetaminophen (TYLENOL) 500 MG tablet  albuterol (PROAIR HFA) 108 (90 Base) MCG/ACT inhaler  ARIPiprazole (ABILIFY) 30 MG tablet  divalproex sodium delayed-release (DEPAKOTE) 500 MG DR tablet  docusate sodium (COLACE) 100 MG capsule  fish oil-omega-3 fatty acids 1000 MG  "capsule  fluticasone (ARNUITY ELLIPTA) 100 MCG/ACT inhaler  ketoconazole (NIZORAL) 2 % external cream  medroxyPROGESTERone (DEPO-PROVERA) 150 MG/ML IM injection  montelukast (SINGULAIR) 10 MG tablet  OLANZapine (ZYPREXA) 10 MG tablet  Omega-3 Fatty Acids (FISH OIL PO)  oxybutynin ER (DITROPAN XL) 15 MG 24 hr tablet  pantoprazole (PROTONIX) 40 MG EC tablet  polyethylene glycol (MIRALAX) 17 g packet  SENNA-docusate sodium (SENNA S) 8.6-50 MG tablet  sennosides (SENOKOT) 8.6 MG tablet  sertraline (ZOLOFT) 100 MG tablet  Vitamin D3 (CHOLECALCIFEROL) 25 mcg (1000 units) tablet        Physical Exam     Vitals:  /73   Pulse 79   Ht 1.6 m (5' 3\")   Wt 81.6 kg (180 lb)   SpO2 99%   BMI 31.89 kg/m      PHYSICAL EXAM:   Physical Exam  Vitals and nursing note reviewed.   Constitutional:       General: She is not in acute distress.     Appearance: Normal appearance.   HENT:      Head: Normocephalic and atraumatic.      Nose: Nose normal.      Mouth/Throat:      Mouth: Mucous membranes are moist.   Eyes:      Pupils: Pupils are equal, round, and reactive to light.   Cardiovascular:      Rate and Rhythm: Normal rate and regular rhythm.      Pulses: Normal pulses.           Radial pulses are 2+ on the right side and 2+ on the left side.        Dorsalis pedis pulses are 2+ on the right side and 2+ on the left side.   Pulmonary:      Effort: Pulmonary effort is normal. No respiratory distress.      Breath sounds: Normal breath sounds.   Abdominal:      Palpations: Abdomen is soft.      Tenderness: There is abdominal tenderness (Minimal diffuse abdominal tenderness). Negative signs include McBurney's sign.   Musculoskeletal:      Cervical back: Full passive range of motion without pain and neck supple.      Comments: No calf tenderness or swelling b/l   Skin:     General: Skin is warm.      Findings: No rash.   Neurological:      General: No focal deficit present.      Mental Status: She is alert. Mental status is at " baseline.      Comments: Fluent speech, no acute lateralizing deficits   Psychiatric:         Mood and Affect: Mood normal.         Behavior: Behavior normal.      Comments: Flat affect         Results     LAB:  All pertinent labs reviewed and interpreted  Labs Ordered and Resulted from Time of ED Arrival to Time of ED Departure   BASIC METABOLIC PANEL - Abnormal       Result Value    Sodium 139      Potassium 4.7      Chloride 101      Carbon Dioxide (CO2) 32 (*)     Anion Gap 6 (*)     Urea Nitrogen 18.6      Creatinine 1.11 (*)     Calcium 8.9      Glucose 85      GFR Estimate 67     ROUTINE UA WITH MICROSCOPIC REFLEX TO CULTURE - Abnormal    Color Urine Colorless      Appearance Urine Clear      Glucose Urine Negative      Bilirubin Urine Negative      Ketones Urine Negative      Specific Gravity Urine 1.015      Blood Urine 0.1 mg/dL (*)     pH Urine 6.0      Protein Albumin Urine Negative      Urobilinogen Urine <2.0      Nitrite Urine Negative      Leukocyte Esterase Urine Negative      RBC Urine 1      WBC Urine 1      Squamous Epithelials Urine 2 (*)    LIPASE - Normal    Lipase 17     MAGNESIUM - Normal    Magnesium 2.2     HEPATIC FUNCTION PANEL - Normal    Protein Total 7.0      Albumin 4.3      Bilirubin Total 0.2      Alkaline Phosphatase 46      AST 18      ALT 13      Bilirubin Direct <0.20     HCG QUALITATIVE PREGNANCY - Normal    hCG Serum Qualitative Negative     CBC WITH PLATELETS AND DIFFERENTIAL    WBC Count 6.6      RBC Count 4.58      Hemoglobin 12.7      Hematocrit 39.4      MCV 86      MCH 27.7      MCHC 32.2      RDW 13.9      Platelet Count 192      % Neutrophils 58      % Lymphocytes 31      % Monocytes 8      % Eosinophils 2      % Basophils 0      % Immature Granulocytes 1      NRBCs per 100 WBC 0      Absolute Neutrophils 3.9      Absolute Lymphocytes 2.1      Absolute Monocytes 0.6      Absolute Eosinophils 0.1      Absolute Basophils 0.0      Absolute Immature Granulocytes 0.0       Absolute NRBCs 0.0         RADIOLOGY:  CTA Abdomen Pelvis with Contrast   Final Result   IMPRESSION:      Normal lower thoracic and abdominal aorta and mesenteric vessels. No findings to suggest a source of acute lower gastrointestinal hemorrhage.                   ECG:  none    PROCEDURES:  Procedures:  none      FINAL IMPRESSION:    ICD-10-CM    1. Constipation, unspecified constipation type  K59.00       2. Blood in stool  K92.1           0 minutes of critical care time      I, Angeles Perea, am serving as a scribe to document services personally performed by Dr. Enoch Cortez, based on my observations and the provider's statements to me. IEnoch, DO attest that Angeles Perea is acting in a scribe capacity, has observed my performance of the services and has documented them in accordance with my direction.      Enoch Cortez DO  Emergency Medicine  [facility] EMERGENCY DEPARTMENT  10/11/2022  7:25 AM        Enoch Cortez MD  10/11/22 1515

## 2022-10-11 NOTE — DISCHARGE INSTRUCTIONS
Take colace twice a day for next 7-10 days.  Increase water in diet and walk regularly for good bowel regimen. Follow up with primary clinic. Take zofran for any nausea.  Bleeding should resolve over next few days.

## 2022-10-17 PROBLEM — N32.81 OVERACTIVE BLADDER: Status: ACTIVE | Noted: 2020-12-30

## 2022-10-17 PROBLEM — J45.901 ACUTE ASTHMA EXACERBATION: Status: ACTIVE | Noted: 2020-12-30

## 2022-10-17 PROBLEM — R09.02 HYPOXIA: Status: ACTIVE | Noted: 2022-01-01

## 2022-10-17 PROBLEM — J69.0 ASPIRATION PNEUMONITIS (H): Status: ACTIVE | Noted: 2022-01-01

## 2022-10-17 PROBLEM — R79.89 ELEVATED D-DIMER: Status: ACTIVE | Noted: 2022-01-01

## 2022-10-17 NOTE — PROGRESS NOTES
Speech-Language Pathology: Clinical Swallow Evaluation     10/17/22 1000   Appointment Info   Signing Clinician's Name / Credentials (SLP) JE Phillips   General Information   Onset of Illness/Injury or Date of Surgery 10/17/22   Pertinent History of Current Problem Michelle Person is a 32 year old female who has hx of major depression, Psychosis, Obesity, asthma, GERD, CONNIE, Seizure, hx of head injury. She lives in a group home  Apparently on 1015 10am was sitting eating chips and aspirated. She had choking spell and vomited and since then has been coughing.  Yesterday developed worsening cough and sob  She states she called EMS herself because she could not breath and could not stop coughing and was wheezing.  She denies fever or chills. She states he aches all over and especially her upper abdomen because of the persistent coughing.  She arrived in Ed hypoxic and is on nasal cannula oxygen.  Imaging reveals Extensive patchy consolidation throughout the right lung particularly of the perihilar region consistent with pneumonia.  Her DDimer was elevated but she currently has no Iv access for Ct contrast and awaiting a PICC line.   General Observations alert and cooperative   Type of Evaluation   Type of Evaluation Swallow Evaluation   Oral Motor   Oral Musculature generally intact   Structural Abnormalities other (see comments)  (high arched, narrow palate)   Dentition (Oral Motor)   Dentition (Oral Motor) natural dentition   Facial Symmetry (Oral Motor)   Facial Symmetry (Oral Motor) WNL   Lip Function (Oral Motor)   Lip Range of Motion (Oral Motor) WNL   Lip Strength (Oral Motor) WNL   Tongue Function (Oral Motor)   Tongue ROM (Oral Motor) WNL;protrusion is impaired;other (see comments)  (slight tongue tip deviation to the (R))   Jaw Function (Oral Motor)   Jaw Function (Oral Motor) WNL   Vocal Quality/Secretion Management (Oral Motor)   Vocal Quality (Oral Motor) WNL   General Swallowing Observations   Past  History of Dysphagia Pt reports globus sensation with solids with regurgitation at times. When asked she states she feels like she coughs a lot when she drinks   Respiratory Support (General Swallowing Observations) nasal cannula   Current Diet/Method of Nutritional Intake (General Swallowing Observations, NIS) NPO   Swallowing Evaluation Clinical swallow evaluation   Clinical Swallow Evaluation   Feeding Assistance no assistance needed   Clinical Swallow Evaluation Textures Trialed thin liquids;soft & bite-sized   Clinical Swallow Eval: Thin Liquid Texture Trial   Mode of Presentation, Thin Liquids straw;self-fed   Volume of Liquid or Food Presented 3 sips   Oral Phase of Swallow WFL   Diagnostic Statement Slightly delayed cough consistently   Clinical Swallow Eval: Soft & Bite Sized   Mode of Presentation fed by clinician   Volume Presented cut up pears   Oral Phase WFL   Pharyngeal Phase cough/choking;feeling of something stuck in throat;repeated swallows;wet vocal quality after swallow   Diagnostic Statement Pt showing immedate cough with multiple swallows and appearance of effortful swallow.   Esophageal Phase of Swallow   Patient reports or presents with symptoms of esophageal dysphagia Yes   Swallowing Recommendations   Diet Consistency Recommendations NPO;other (see comments)  (will make nutrition recommendation following results of VFSS and esophagram.)   Instrumental Assessment Recommendations VFSS (videofluoroscopic swallowing study)   Comment, Swallowing Recommendations will make nutrition recommendation following results of VFSS and esophagram.   Clinical Impression   Criteria for Skilled Therapeutic Interventions Met (SLP Eval) Yes, treatment indicated   SLP Diagnosis dysphagia   Clinical Impression Comments Pt showing clinical overt sx's aspiration with thin and soft/cut up textures. Pt reports frequent feeling of food getting stuck/regurgitated at home and feeling like she is going to choke. She  states she also has asthma.   SLP Total Evaluation Time   Eval: oral/pharyngeal swallow function, clinical swallow Minutes ([id number]) 25   Total Session Time   Total Session Time (sum of timed and untimed services) 25

## 2022-10-17 NOTE — ED PROVIDER NOTES
EMERGENCY DEPARTMENT ENCOUNTER      NAME: Michelle Person  AGE: 32 year old female  YOB: 1990  MRN: [id number]  EVALUATION DATE & TIME: 10/17/2022  1:50 AM    PCP: Radha Torres    ED PROVIDER: Gurdeep Muñoz M.D.      Chief Complaint   Patient presents with     Cough     Shortness of Breath         FINAL IMPRESSION:  1. Aspiration pneumonitis (H)    2. Hypoxia          ED COURSE & MEDICAL DECISION MAKIN:01 AM I met the patient and performed my initial interview and exam.   3:35 AM Per nursing, patient is a very difficult large bore vascular access. Will need to call PICC team for vascular access.  3:54 AM Repeat exam is benign. Discussed findings and need for admission.  4:01 AM I spoke with Dr. Saini, hospitalist.    Pertinent Labs & Imaging studies reviewed. (See chart for details)  32 year old female presents to the Emergency Department for evaluation of shortness of breath. Patient appears non toxic with stable vitals signs, patient slightly tachycardic but afebrile with no hypoxia though she is on supplemental oxygen, did note increased respiratory rate with no distress.  Coarse breath sounds with wheezing, abdomen is benign with no focal tenderness, no rigidity or distention.  Does not endorse any new chest pain, does endorse ongoing chronic headache.  No reports of any falls or trauma.  Does endorse some vomiting and shortness of breath thereafter.  Review of the medical record shows history of asthma.  Concern for asthma exacerbation, aspiration pneumonitis, considered but less likely pneumonia, COVID or influenza, her story is certainly atypical for ACS or CHF, considered but low suspicion for PE, nothing to suggest dissection or esophageal rupture.  We will obtain screening labs, blood gas, ECG and D-dimer, we will obtain CT imaging or chest x-ray based on results of the D-dimer.  Patient was given steroids and DuoNeb treatment.    Reassessment: BNP slightly elevated, initial  and repeat troponin showed no acute concerning findings, ECG showed no acute concerning findings.  Did note elevated D-dimer however patient was a very difficult vascular access and we had to wait for PICC team to arrive before adequate large-bore IV access could be obtained.  That said during her work-up we did obtain chest x-ray which reported findings consistent with pneumonia in the right lung.  I suspect aspiration pneumonitis and developing pneumonia and feel the patient would benefit from admission for supplemental oxygen and broad-spectrum antibiotics.  We did obtain blood cultures and initiate antibiotic therapy.  Low overall clinical suspicion for PE.  Repeat exam the patient was benign.  Discussed these findings and need for admission with the patient.  Discussed care plan with admitting service, discussed risk and benefits of CT PE imaging studies in the setting of clear clinical source of the patient's symptoms, we will leave to the discretion of the admitting service need for CT PE study.    Medical Decision Making    Supplemental history from: EMS    External Record(s) Reviewed: Inpatient Record    Differential Diagnosis: See MDM charting for differential considered.     I performed an independent interpretation of the: EKG: See my documentation.    Discussed with radiology regarding test interpretation: N/A    Discussion of management with another provider: Hospitalist    The following testing was considered but ultimately not selected: None     I considered prescription management with: Antibiotic    The patient's care impacted: None    Consideration of Admission/Observation: N/A - Patient admitted or discharged without consideration for admission    Care significantly affected by Social Determinants of Health including: N/A        At the conclusion of the encounter I discussed the results of all of the tests and the disposition. The questions were answered and return precautions provided. The  patient or family acknowledged understanding and was agreeable with the care plan.         MEDICATIONS GIVEN IN THE EMERGENCY:  Medications   ARIPiprazole (ABILIFY) tablet 30 mg (has no administration in time range)   divalproex sodium delayed-release (DEPAKOTE) DR tablet 2,000 mg (has no administration in time range)   docusate sodium (COLACE) capsule 100 mg (has no administration in time range)   fluticasone (ARNUITY ELLIPTA) 100 MCG/ACT inhaler 1 puff (has no administration in time range)   montelukast (SINGULAIR) tablet 10 mg (has no administration in time range)   OLANZapine (zyPREXA) tablet 5 mg (has no administration in time range)   oxybutynin ER (DITROPAN XL) 24 hr tablet 15 mg (has no administration in time range)   pantoprazole (PROTONIX) EC tablet 40 mg (has no administration in time range)   polyethylene glycol (MIRALAX) Packet 17 g (has no administration in time range)   sertraline (ZOLOFT) tablet 200 mg (has no administration in time range)   lidocaine 1 % 0.1-1 mL (has no administration in time range)   lidocaine (LMX4) cream (has no administration in time range)   sodium chloride (PF) 0.9% PF flush 3 mL (3 mLs Intracatheter Not Given 10/17/22 0439)   sodium chloride (PF) 0.9% PF flush 3 mL (has no administration in time range)   enoxaparin ANTICOAGULANT (LOVENOX) injection 40 mg (has no administration in time range)   acetaminophen (TYLENOL) tablet 650 mg (has no administration in time range)     Or   acetaminophen (TYLENOL) Suppository 650 mg (has no administration in time range)   ipratropium - albuterol 0.5 mg/2.5 mg/3 mL (DUONEB) neb solution 3 mL (has no administration in time range)   ondansetron (ZOFRAN ODT) ODT tab 4 mg (has no administration in time range)     Or   ondansetron (ZOFRAN) injection 4 mg (has no administration in time range)   piperacillin-tazobactam (ZOSYN) 3.375 g vial to attach to  mL bag (has no administration in time range)   lidocaine 1 % 0.1-5 mL (has no  administration in time range)   lidocaine (LMX4) cream (has no administration in time range)   sodium chloride (PF) 0.9% PF flush 10-40 mL (has no administration in time range)   ipratropium - albuterol 0.5 mg/2.5 mg/3 mL (DUONEB) neb solution 3 mL (3 mLs Nebulization Given 10/17/22 0224)   dexamethasone PF (DECADRON) injection 10 mg (10 mg Intravenous Given 10/17/22 0247)   piperacillin-tazobactam (ZOSYN) 3.375 g vial to attach to  mL bag (3.375 g Intravenous Given 10/17/22 6248)       NEW PRESCRIPTIONS STARTED AT TODAY'S ER VISIT  New Prescriptions    No medications on file            =================================================================    HPI    Patient information was obtained from: patient     Use of Intrepreter: N/A        Michelle Person is a 32 year old female who presents to the ED for evaluation of shortness of breath.    Per chart review, patient presented 10/11/22 at [facility] ED for evaluation of bloody stools for 1 day and constipation. UA negative for blood or infection. Labs all negative including HgB and BUN. CTA abd/pelvis negative for any acute pathology. Patient given IVF, bentyl, zofran, and iopamidol in ED. Discharged in stable condition with new prescriptions for colace and zofran.     Per EMS, patient comes from group home with worsening shortness of breath. Patient was 72% on RA upon their arrival, given Neb and went up to 95%.  Per EMS, patient was hypotensive with SBP in 60's.      The patient reports she was eating cheetos at ~10 AM 10/16 when she vomited and then experienced a sudden onset of shortness of breath. The shortness of breath has persisted since then. The patient does endorse a history of asthma and tried her albuterol inhaler 4 times since the onset of her symptoms with no relief. The patient also endorses a cough, headache, and states she feels feverish though has not checked her temperature. She states she gets headaches often, has not taken anything for  her current headache. The patient also endorses blood in her stool which she attributes to missing her depo shot. She denies any dysuria or any other complaints at this time.     REVIEW OF SYSTEMS   Constitutional:  Endorses fever, chills  Respiratory:  Endorses shortness of breath and cough.  Cardiovascular:  Denies chest pain, palpitations  GI:  Denies abdominal pain, nausea, or change in bowel or bladder habits. Endorses vomiting.  Musculoskeletal:  Denies any new muscle/joint swelling  Skin:  Denies rash   Neurologic:  Denies focal weakness. Endorses headache.  All systems negative except as marked.     PAST MEDICAL HISTORY:  Past Medical History:   Diagnosis Date     Asthma      Depression      Morbid obesity (H)      MR (mental retardation)      Psychosis (H)      Seizures (H)      Sleep apnea     been refusing to wear cpap machine at group home     Suicidal ideation      Suicide attempt (H)        PAST SURGICAL HISTORY:  Past Surgical History:   Procedure Laterality Date     CARDIAC SURGERY      pt reports as an infant, unable to obtain records     HC PELVIC EXAMINATION W ANESTH N/A 12/13/2018    Procedure: EXAM UNDER ANESTHESIA,  DRAINAGE OF ABCESS;  Surgeon: Caryn Ortiz MD;  Location: [facility];  Service: Gynecology     PICC MIDLINE INSERTION  8/23/2020          WISDOM TOOTH EXTRACTION           CURRENT MEDICATIONS:    Prior to Admission medications    Medication Sig Start Date End Date Taking? Authorizing Provider   acetaminophen (TYLENOL) 500 MG tablet Take 1-2 tablets (500-1,000 mg) by mouth every 6 hours as needed for mild pain 9/13/22   Yanna Perea PA-C   albuterol (PROAIR HFA) 108 (90 Base) MCG/ACT inhaler Inhale 2 puffs into the lungs daily as needed for wheezing 12/30/20   Steve Fagan MD   ARIPiprazole (ABILIFY) 30 MG tablet Take 1 tablet (30 mg) by mouth daily 12/31/20   Steve Fagan MD   divalproex sodium delayed-release (DEPAKOTE) 500 MG DR tablet Take 4 tablets  (2,000 mg) by mouth At Bedtime 12/30/20   Steve Fagan MD   docusate sodium (COLACE) 100 MG capsule Take 1 capsule (100 mg) by mouth 2 times daily 10/11/22   Enoch Cortez MD   docusate sodium (COLACE) 100 MG capsule Take 1 capsule (100 mg) by mouth daily 12/31/20   Steve Fagan MD   fish oil-omega-3 fatty acids 1000 MG capsule Take 1 capsule (1 g) by mouth 3 times daily 12/30/20   Steve Fagan MD   fluticasone (ARNUITY ELLIPTA) 100 MCG/ACT inhaler Inhale 1 puff into the lungs daily 12/31/20   Steve Fagan MD   ketoconazole (NIZORAL) 2 % external cream Apply 1 Application topically 2 times daily as needed Apply to skin fold 7/21/17   Reported, Patient   medroxyPROGESTERone (DEPO-PROVERA) 150 MG/ML IM injection Inject 1 mL (150 mg) into the muscle every 3 months Last injection was on 12/10/20 per group home 12/30/20   Steve Fagan MD   montelukast (SINGULAIR) 10 MG tablet Take 1 tablet (10 mg) by mouth At Bedtime 12/30/20   Steve Fagan MD   OLANZapine (ZYPREXA) 10 MG tablet Take 1 tablet (10 mg) by mouth 3 times daily as needed (associated with psychosis or magdalene) 12/30/20   Steve Fagan MD   Omega-3 Fatty Acids (FISH OIL PO) Take 1 capsule by mouth 3 times daily  7/21/17   Reported, Patient   ondansetron (ZOFRAN ODT) 4 MG ODT tab Take 1 tablet (4 mg) by mouth every 8 hours as needed for nausea 10/11/22   Enoch Cortez MD   oxybutynin ER (DITROPAN XL) 15 MG 24 hr tablet Take 1 tablet (15 mg) by mouth daily 12/31/20   Steve Fagan MD   pantoprazole (PROTONIX) 40 MG EC tablet Take 1 tablet (40 mg) by mouth daily 12/31/20   Steve Fagan MD   polyethylene glycol (MIRALAX) 17 g packet Take 17 g by mouth daily as needed for constipation 12/30/20   Steve Fagan MD   SENNA-docusate sodium (SENNA S) 8.6-50 MG tablet Take 1 tablet by mouth At Bedtime 12/30/20   Steve Fagan MD   sennosides (SENOKOT) 8.6 MG tablet Take 1 tablet by mouth 2 times daily  as needed for constipation 12/30/20   Steve Fagan MD   sertraline (ZOLOFT) 100 MG tablet Take 2 tablets (200 mg) by mouth At Bedtime 12/30/20   Steve Fagan MD   Vitamin D3 (CHOLECALCIFEROL) 25 mcg (1000 units) tablet Take 1 tablet (25 mcg) by mouth daily 12/31/20   Steve Fagan MD        ALLERGIES:  Allergies   Allergen Reactions     Ibuprofen Other (See Comments)     Until cleared by primary MD d/t kidney function     Tomato Unknown       FAMILY HISTORY:  Family History   Problem Relation Age of Onset     Hypertension Mother        SOCIAL HISTORY:   Social History     Socioeconomic History     Marital status: Single   Tobacco Use     Smoking status: Former     Smokeless tobacco: Never   Substance and Sexual Activity     Alcohol use: No     Drug use: No   Social History Narrative    The patient lives in a foster home.        VITALS:  Patient Vitals for the past 24 hrs:   BP Temp Temp src Pulse Resp SpO2   10/17/22 0545 131/77 -- -- 76 -- 97 %   10/17/22 0530 131/79 -- -- 74 -- 97 %   10/17/22 0523 -- -- -- 76 25 97 %   10/17/22 0501 105/71 -- -- 79 25 98 %   10/17/22 0500 97/64 -- -- 79 25 97 %   10/17/22 0452 -- -- -- 82 25 97 %   10/17/22 0445 97/65 -- -- 82 26 97 %   10/17/22 0430 107/71 -- -- 92 -- 99 %   10/17/22 0415 109/76 -- -- 96 29 98 %   10/17/22 0354 -- -- -- 101 -- 98 %   10/17/22 0346 117/77 -- -- 102 -- 97 %   10/17/22 0333 -- -- -- 109 19 98 %   10/17/22 0315 112/78 -- -- 105 -- 97 %   10/17/22 0302 117/83 -- -- 115 -- 97 %   10/17/22 0301 -- -- -- 113 26 98 %   10/17/22 0159 -- -- -- 107 -- 96 %   10/17/22 0154 121/89 98.5  F (36.9  C) Oral 108 24 94 %        PHYSICAL EXAM    Constitutional:  Awake, alert, in no apparent distress  HENT:  Normocephalic, Atraumatic. Bilateral external ears normal. Oropharynx moist. Nose normal. Neck- Normal range of motion with no guarding, No midline cervical tenderness, Supple, No stridor.   Eyes:  PERRL, EOMI with no signs of entrapment,  Conjunctiva normal, No discharge.   Respiratory: Coarse breath sounds with wheezing, increased respiratory rate  Cardiovascular: Mild tachycardia, Normal rhythm, No appreciable rubs or gallops.   GI:  Soft, No tenderness, No distension, No palpable masses  Musculoskeletal:  Intact distal pulses, No edema. Good range of motion in all major joints. No tenderness to palpation or major deformities noted.  Integument:  Warm, Dry, No erythema, No rash.   Neurologic:  Alert & oriented, Normal motor function, Normal sensory function, No focal deficits noted.   Psychiatric:  Affect normal, Judgment normal, Mood normal.     LAB:  All pertinent labs reviewed and interpreted.  Results for orders placed or performed during the hospital encounter of 10/17/22   XR Chest 2 Views    Impression    IMPRESSION: Extensive patchy consolidation throughout the right lung particularly of the perihilar region consistent with pneumonia. There may be mild involvement of the left perihilar region. No pleural effusion or pneumothorax. Top normal heart size.   Extra Blood Culture Bottle   Result Value Ref Range    Hold Specimen JIC    Extra Blue Top Tube   Result Value Ref Range    Hold Specimen JIC    Extra Red Top Tube   Result Value Ref Range    Hold Specimen JIC    Extra Green Top (Lithium Heparin) Tube   Result Value Ref Range    Hold Specimen JIC    Extra Purple Top Tube   Result Value Ref Range    Hold Specimen JIC    D dimer quantitative   Result Value Ref Range    D-Dimer Quantitative 0.92 (H) 0.00 - 0.50 ug/mL FEU   Basic metabolic panel   Result Value Ref Range    Sodium 139 136 - 145 mmol/L    Potassium 4.7 3.4 - 5.3 mmol/L    Chloride 102 98 - 107 mmol/L    Carbon Dioxide (CO2) 27 22 - 29 mmol/L    Anion Gap 10 7 - 15 mmol/L    Urea Nitrogen 8.6 6.0 - 20.0 mg/dL    Creatinine 1.09 (H) 0.51 - 0.95 mg/dL    Calcium 8.4 (L) 8.6 - 10.0 mg/dL    Glucose 113 (H) 70 - 99 mg/dL    GFR Estimate 68 >60 mL/min/1.73m2   Result Value Ref Range     Troponin T, High Sensitivity 11 <=14 ng/L   Nt probnp inpatient (BNP)   Result Value Ref Range    N terminal Pro BNP Inpatient 636 (H) 0 - 450 pg/mL   HCG qualitative Blood   Result Value Ref Range    hCG Serum Qualitative Negative Negative   Symptomatic; Unknown Influenza A/B & SARS-CoV2 (COVID-19) Virus PCR Multiplex Nose    Specimen: Nose; Swab   Result Value Ref Range    Influenza A PCR Negative Negative    Influenza B PCR Negative Negative    RSV PCR Negative Negative    SARS CoV2 PCR Negative Negative   CBC with platelets and differential   Result Value Ref Range    WBC Count 10.7 4.0 - 11.0 10e3/uL    RBC Count 4.40 3.80 - 5.20 10e6/uL    Hemoglobin 12.4 11.7 - 15.7 g/dL    Hematocrit 38.2 35.0 - 47.0 %    MCV 87 78 - 100 fL    MCH 28.2 26.5 - 33.0 pg    MCHC 32.5 31.5 - 36.5 g/dL    RDW 14.7 10.0 - 15.0 %    Platelet Count 215 150 - 450 10e3/uL    % Neutrophils 79 %    % Lymphocytes 15 %    % Monocytes 5 %    % Eosinophils 1 %    % Basophils 0 %    % Immature Granulocytes 0 %    NRBCs per 100 WBC 0 <1 /100    Absolute Neutrophils 8.4 (H) 1.6 - 8.3 10e3/uL    Absolute Lymphocytes 1.6 0.8 - 5.3 10e3/uL    Absolute Monocytes 0.5 0.0 - 1.3 10e3/uL    Absolute Eosinophils 0.1 0.0 - 0.7 10e3/uL    Absolute Basophils 0.0 0.0 - 0.2 10e3/uL    Absolute Immature Granulocytes 0.0 <=0.4 10e3/uL    Absolute NRBCs 0.0 10e3/uL   Result Value Ref Range    Troponin T, High Sensitivity 12 <=14 ng/L   Result Value Ref Range    Procalcitonin 0.12 (H) <0.05 ng/mL       RADIOLOGY:  XR Chest 2 Views   Final Result   IMPRESSION: Extensive patchy consolidation throughout the right lung particularly of the perihilar region consistent with pneumonia. There may be mild involvement of the left perihilar region. No pleural effusion or pneumothorax. Top normal heart size.      CT Chest Pulmonary Embolism w Contrast    (Results Pending)          EKG:    Sinus tachycardia, no specific ST acute ischemic changes, no concerning dysrhythmias or  interval prolongation, when compared to ECG of March 14, 2021, no specific ST acute ischemic change appreciated  I have independently reviewed and interpreted the EKG(s) documented above.        I, Nayla Santiago, am serving as a scribe to document services personally performed by Gurdeep Muñoz MD, based on my observation and the provider's statements to me. I, Gurdeep Muñoz MD attest that Nayla Santiago is acting in a scribe capacity, has observed my performance of the services and has documented them in accordance with my direction.    Gurdeep Muñoz M.D.  Emergency Medicine  [facility] EMERGENCY DEPARTMENT  [employer] [facility] [phone]  Dept: [phone]     Gurdeep Muñoz MD  10/17/22 0631

## 2022-10-17 NOTE — PROGRESS NOTES
RESPIRATORY CARE NOTE     Patient Name: Michelle Person  Today's Date: 10/17/2022       Pt continues to receive duo neb with flutter. BS are dem. Pt is endy changeincreased aeration. RA, SpO2 is 95%. Post treatment there is  Pt also perceives improvement.  RT encouraged deep breathing and coughing techniques .  RT will continue to monitor and assess.

## 2022-10-17 NOTE — H&P
"[facility]    History and Physical - Hospitalist Service       Date of Admission:  10/17/2022    Assessment & Plan      1. Aspiration Pneumonia  2.Acute Hypoxic Respiratory failure  3.GERD  4.Elevated Ddimer  5.Major Depression with Psychosis  6.Asthma Exacerbation  7.hx of Seizures    Plan  Admit to Inpatient cardiac Monitored bed   Vital sighs Q4 and prn  continuous pulse ox  Bedrest with  Bedside commode.    1.Pneumonia  Aspiration  IV zosyn q8  Oxygen to keep sats >91%  Will consult speech for swallow eval    2.Elevated DDimer  Less likely PE  Will await Ct chest when IV access available  Hold anticoagulation till Ct done    3.Asthma exacerbation  exacerbation worsened by aspiration  Continue neb tx q6 duoneb  Hs Singulair  Prednisone given in ed    4.Major depression/psychosis  Continue zyprexa prn  sertraline and Aripiprazole    5.Constipation  milarax and senna as needed  Antiemetics as neededContinue lisinopril 20mg daily  Prn hydralazine 5mg for elevated BP         Diet:  po and advance per speech  DVT Prophylaxis: Enoxaparin (Lovenox) subcutaneous for now unless positive for PE  Young Catheter: Not present  Central Lines: None  Cardiac Monitoring: None  Code Status:  Full per patient    Clinically Significant Risk Factors Present on Admission          # Hypocalcemia: Ca = 8.4 mg/dL (Ref range: 8.6 - 10.0 mg/dL) and/or iCa = N/A on admission, will replace as needed           # Obesity: Estimated body mass index is 31.89 kg/m  as calculated from the following:    Height as of 10/11/22: 1.6 m (5' 3\").    Weight as of 10/11/22: 81.6 kg (180 lb).        Disposition Plan      Expected Discharge Date: 10/19/2022                The patient's care was discussed with the Patient.    Rosa Saini MD  Hospitalist Service  [facility]  Securely message with the Vocera Web Console (learn more here)  Text page via [employer] Paging/Directory "         ______________________________________________________________________    Chief Complaint   SOB and cough for 2 days    History is obtained from the patient    History of Present Illness   Michelle Person is a 32 year old female who has hx of major depression, Psychosis, Obesity, asthma, GERD, CONNIE, Seizure, hx of head injury. She lives in a group home  Apparently on 1015 10am was sitting eating chips and aspirated. She had choking spell and vomited and since then has been coughing.  Yesterday developed worsening cough and sob  She states she called EMS herself because she could not breath and could not stop coughing and was wheezing.  She denies fever or chills. She states he aches all over and especially her upper abdomen because of the persistent coughing.  She arrived in Ed hypoxic and is on nasal cannula oxygen.  Imaging reveals Extensive patchy consolidation throughout the right lung particularly of the perihilar region consistent with pneumonia.  Her DDimer was elevated but she currently has no Iv access for Ct contrast and awaiting a PICC line.    Review of Systems    CONSTITUTIONAL:  negative for  fevers, chills and fatigue  HEENT:  negative  RESPIRATORY:  positive for  dry cough, dyspnea and wheezing  CARDIOVASCULAR:  negative for  chest pain, palpitations, orthopnea, exertional chest pressure/discomfort  GASTROINTESTINAL:  positive for constipation  GENITOURINARY:  negative for frequency and dysuria  HEMATOLOGIC/LYMPHATIC:  negative  ENDOCRINE:  negative for heat intolerance, weight changes and hair loss  MUSCULOSKELETAL:  negative for  myalgias, joint swelling and stiff joints  NEUROLOGICAL:  negative for headaches, speech problems, dysphagia and weakness  BEHAVIOR/PSYCH:  positive for depressed mood    Past Medical History    I have reviewed this patient's medical history and updated it with pertinent information if needed.   Past Medical History:   Diagnosis Date     Asthma      Depression       Morbid obesity (H)      MR (mental retardation)      Psychosis (H)      Seizures (H)      Sleep apnea     been refusing to wear cpap machine at group home     Suicidal ideation      Suicide attempt (H)        Past Surgical History   I have reviewed this patient's surgical history and updated it with pertinent information if needed.  Past Surgical History:   Procedure Laterality Date     CARDIAC SURGERY      pt reports as an infant, unable to obtain records     HC PELVIC EXAMINATION W ANESTH N/A 12/13/2018    Procedure: EXAM UNDER ANESTHESIA,  DRAINAGE OF ABCESS;  Surgeon: Caryn Ortiz MD;  Location: [facility] [place];  Service: Gynecology     PICC MIDLINE INSERTION  8/23/2020          WISDOM TOOTH EXTRACTION         Social History   I have reviewed this patient's social history and updated it with pertinent information if needed.  Social History     Tobacco Use     Smoking status: Former     Smokeless tobacco: Never   Substance Use Topics     Alcohol use: No     Drug use: No       Family History   I have reviewed this patient's family history and updated it with pertinent information if needed.  Family History   Problem Relation Age of Onset     Hypertension Mother        Prior to Admission Medications   Prior to Admission Medications   Prescriptions Last Dose Informant Patient Reported? Taking?   ARIPiprazole (ABILIFY) 30 MG tablet   No No   Sig: Take 1 tablet (30 mg) by mouth daily   OLANZapine (ZYPREXA) 5 MG tablet   Yes Yes   Sig: Take 5 mg by mouth daily as needed (agitation anxiety)   Omega-3 Fatty Acids (FISH OIL PO)   Yes No   Sig: Take 1 capsule by mouth 3 times daily    SENNA-docusate sodium (SENNA S) 8.6-50 MG tablet   No No   Sig: Take 1 tablet by mouth At Bedtime   Vitamin D3 (CHOLECALCIFEROL) 25 mcg (1000 units) tablet   No No   Sig: Take 1 tablet (25 mcg) by mouth daily   acetaminophen (TYLENOL) 500 MG tablet   No No   Sig: Take 1-2 tablets (500-1,000 mg) by mouth every 6 hours as needed for  mild pain   albuterol (PROAIR HFA) 108 (90 Base) MCG/ACT inhaler   No No   Sig: Inhale 2 puffs into the lungs daily as needed for wheezing   divalproex sodium delayed-release (DEPAKOTE) 500 MG DR tablet   No No   Sig: Take 4 tablets (2,000 mg) by mouth At Bedtime   docusate sodium (COLACE) 100 MG capsule   No No   Sig: Take 1 capsule (100 mg) by mouth daily   docusate sodium (COLACE) 100 MG capsule   No No   Sig: Take 1 capsule (100 mg) by mouth 2 times daily   fish oil-omega-3 fatty acids 1000 MG capsule   No No   Sig: Take 1 capsule (1 g) by mouth 3 times daily   fluticasone (ARNUITY ELLIPTA) 100 MCG/ACT inhaler   No No   Sig: Inhale 1 puff into the lungs daily   ketoconazole (NIZORAL) 2 % external cream   Yes No   Sig: Apply 1 Application topically 2 times daily as needed Apply to skin fold   medroxyPROGESTERone (DEPO-PROVERA) 150 MG/ML IM injection   No No   Sig: Inject 1 mL (150 mg) into the muscle every 3 months Last injection was on 12/10/20 per group home   montelukast (SINGULAIR) 10 MG tablet   No No   Sig: Take 1 tablet (10 mg) by mouth At Bedtime   ondansetron (ZOFRAN ODT) 4 MG ODT tab   No No   Sig: Take 1 tablet (4 mg) by mouth every 8 hours as needed for nausea   oxybutynin ER (DITROPAN XL) 15 MG 24 hr tablet   No No   Sig: Take 1 tablet (15 mg) by mouth daily   pantoprazole (PROTONIX) 40 MG EC tablet   No No   Sig: Take 1 tablet (40 mg) by mouth daily   polyethylene glycol (MIRALAX) 17 g packet   No No   Sig: Take 17 g by mouth daily as needed for constipation   sennosides (SENOKOT) 8.6 MG tablet   No No   Sig: Take 1 tablet by mouth 2 times daily as needed for constipation   sertraline (ZOLOFT) 100 MG tablet   No No   Sig: Take 2 tablets (200 mg) by mouth At Bedtime      Facility-Administered Medications: None     Allergies   Allergies   Allergen Reactions     Ibuprofen Other (See Comments)     Until cleared by primary MD d/t kidney function     Tomato Unknown       Physical Exam   Vital Signs: Temp:  98.5  F (36.9  C) Temp src: Oral BP: 117/77 Pulse: 101   Resp: 19 SpO2: 98 % O2 Device: Nasal cannula Oxygen Delivery: 3 LPM  Weight: 0 lbs 0 oz    {Physical Exam  Constitutional:       Appearance: She is obese. She is ill-appearing. She is not toxic-appearing or diaphoretic.   HENT:      Head: Normocephalic and atraumatic.      Right Ear: Tympanic membrane normal.      Left Ear: Tympanic membrane normal.      Ears:      Comments: normal hearing bilaterally     Nose: Nose normal.      Mouth/Throat:      Mouth: Mucous membranes are moist.      Pharynx: Oropharynx is clear.   Eyes:      Extraocular Movements: Extraocular movements intact.      Conjunctiva/sclera: Conjunctivae normal.      Pupils: Pupils are equal, round, and reactive to light.   Cardiovascular:      Rate and Rhythm: Regular rhythm. Tachycardia present.      Pulses: Normal pulses.      Heart sounds: Normal heart sounds. No murmur heard.  Pulmonary:      Breath sounds: Wheezing and rhonchi present.   Chest:      Chest wall: No tenderness.   Abdominal:      General: Bowel sounds are normal.      Palpations: Abdomen is soft.      Tenderness: There is no abdominal tenderness. There is no guarding.   Musculoskeletal:         General: Swelling present. No tenderness. Normal range of motion.      Cervical back: Normal range of motion and neck supple.   Skin:     General: Skin is warm and dry.      Capillary Refill: Capillary refill takes more than 3 seconds.      Coloration: Skin is not jaundiced or pale.      Findings: No bruising.   Neurological:      General: No focal deficit present.      Mental Status: She is alert and oriented to person, place, and time.   Psychiatric:         Mood and Affect: Mood normal.         Behavior: Behavior normal.         Data   Data reviewed today: I reviewed all medications, new labs and imaging results over the last 24 hours..    Recent Labs   Lab 10/17/22  0238 10/11/22  0806   WBC 10.7 6.6   HGB 12.4 12.7   MCV 87 86     192    139   POTASSIUM 4.7 4.7   CHLORIDE 102 101   CO2 27 32*   BUN 8.6 18.6   CR 1.09* 1.11*   ANIONGAP 10 6*   JULIÁN 8.4* 8.9   * 85   ALBUMIN  --  4.3   PROTTOTAL  --  7.0   BILITOTAL  --  0.2   ALKPHOS  --  46   ALT  --  13   AST  --  18   LIPASE  --  17        Latest Reference Range & Units Most Recent 10/17/22 02:38   N-Terminal Pro BNP Inpatient 0 - 450 pg/mL 636 (H)  10/17/22 02:38 636 (H)   Procalcitonin 0.00 - 0.49 ng/mL 2.98 (H)  8/24/20 06:18    Procalcitonin <0.05 ng/mL 0.12 (H)  10/17/22 02:38 0.12 (H)   (H): Data is abnormally high     Latest Reference Range & Units Most Recent   pH Arterial 7.37 - 7.44  7.35 (L)  8/23/20 14:01   pCO2 Arterial 35 - 45 mm Hg 46 (H)  8/23/20 14:01   PO2 Arterial 80 - 90 mm Hg 129 (H)  8/23/20 14:01   Bicarbonate, Arterial Calc 23.0 - 29.0 mmol/L 23.7  8/23/20 14:01   Base Excess Art mmol/L -0.7  8/23/20 14:01   Ph Venous 7.35 - 7.45  7.28 (L)  8/22/20 20:19   PCO2 Venous 35 - 50 mm Hg 58 (H)  8/22/20 20:19   PO2 Venous 25 - 47 mm Hg 37  8/22/20 20:19   Base Excess Venous mmol/L 0.5  8/22/20 20:19   Oxyhemoglobin (VBG) 70.0 - 75.0 % 56.3 (L)  8/22/20 20:19   Oxyhemoglobin Venous 70.0 - 75.0 % 57.6 (L)  8/22/20 20:19   Oxyhemoglobin 96.0 - 97.0 % 97.0  8/23/20 14:01   HCO3, Venous 24.0 - 30.0 mmol/L 23.4 (L)  8/22/20 20:19   (L): Data is abnormally low  (H): Data is abnormally high     Latest Reference Range & Units 10/17/22 02:38   D-Dimer Quantitative 0.00 - 0.50 ug/mL FEU 0.92 (H)   (H): Data is abnormally high  Recent Results (from the past 24 hour(s))   XR Chest 2 Views    Narrative    EXAM: XR CHEST 2 VIEWS  LOCATION: [facility]  DATE/TIME: 10/17/2022 3:30 AM    INDICATION: cough  COMPARISON: 12/20/2021.      Impression    IMPRESSION: Extensive patchy consolidation throughout the right lung particularly of the perihilar region consistent with pneumonia. There may be mild involvement of the left perihilar region. No  pleural effusion or pneumothorax. Top normal heart size.       Rosa Saini MD on 10/17/2022 at 4:41 AM

## 2022-10-17 NOTE — CONSULTS
Care Management Initial Consult    General Information  Assessment completed with: Care Team Member,  ([facility] manager-Guillermo)  Type of CM/SW Visit: Initial Assessment    Primary Care Provider verified and updated as needed:     Readmission within the last 30 days:  (ED visit)         Advance Care Planning: waiting for group home confirmation on if HCD on file           Communication Assessment  Patient's communication style: spoken language (English or Bilingual)             Cognitive  Cognitive/Neuro/Behavioral:   none                     Living Environment:   People in home: facility resident     Current living Arrangements: group home      Able to return to prior arrangements: yes       Family/Social Support:  Care provided by: self (group home staff)  Provides care for: no one  Marital Status: Single  Parent(s), Facility resident(s)/Staff          Description of Support System: Supportive, Involved         Current Resources:   Patient receiving home care services: No     Community Resources:  (Day program,  for outings)  Equipment currently used at home: none  Supplies currently used at home: None         Lifestyle & Psychosocial Needs:  Social Determinants of Health     Tobacco Use: Medium Risk     Smoking Tobacco Use: Former     Smokeless Tobacco Use: Never     Passive Exposure: Not on file   Alcohol Use: Not on file   Financial Resource Strain: Not on file   Food Insecurity: Not on file   Transportation Needs: Not on file   Physical Activity: Not on file   Stress: Not on file   Social Connections: Not on file   Intimate Partner Violence: Not on file   Depression: Not on file   Housing Stability: Not on file       Functional Status:  Prior to admission patient needed assistance:   Dependent ADLs:: Independent  Dependent IADLs:: Shopping, Medication Management, Money Management, Transportation, Meal Preparation                 Additional Information:    Assessment completed with [facility]  manager-Guillemro. Patient is independent with ADLs and requires assistance with IADLs. She is her own decision maker. She ambulates without devices. Patient attends Day Program M-F and has a  for outings. Her mom is her primary family contact. Group home may be able to transport depending on timing.     Kristina Díaz RN

## 2022-10-17 NOTE — PROGRESS NOTES
Assessment & Plan   32-year-old female with seizure disorder, mild intellectual disability, asthma, CONNIE, mood disorder presents with aspiration pneumonia and acute hypoxic respiratory failure    Principal Problem:    Hypoxia  Active Problems:    Mood disorder (H)    Acute asthma exacerbation    Overactive bladder    Acute respiratory failure with hypoxia (H)    Gastroesophageal reflux disease without esophagitis    Sleep apnea    Aspiration pneumonitis (H)    Elevated brain natriuretic peptide (BNP) level    Present on Admission:    Aspiration pneumonitis (H)    Hypoxia    Acute respiratory failure with hypoxia (H)    Sleep apnea    Overactive bladder      Aspiration pneumonia, acute hypoxic and hypercapnic respiratory failure  -Patient presented with worsening cough and shortness of breath after witnessed episode of aspiration.  Chest x-ray 10/17/2022 with extensive patchy consolidation throughout the right lung particularly in the perihilar region consistent with pneumonia.  Patient otherwise without leukocytosis, hemodynamically stable without fevers, mildly elevated procalcitonin, no signs of sepsis.  COVID and influenza negative.  CTA chest does suggest some esophageal motility issues.  Patient, though, mild acidosis on blood gas second to hypercapnia has been necessitating 3 L by nasal cannula to maintain SPO2 greater than 88%.  -Zosyn IV every 8 hours  -Attempt sputum culture  -Supplemental oxygen as needed  -Incentive spirometry plus flutter valve  -SLP evaluation    Acute asthma exacerbation  -Patient presenting with wheezing, hypoxia in the setting of aspiration and new asthma.  -DuoNeb 4 times daily  -Continue home fluticasone twice daily  -Start prednisone 40 mg p.o. daily x5  -Montelukast 10 mg p.o. daily  -Pulmonary hygiene as above    Elevated D-dimer  -Elevated D-dimer 0.92 in the setting of cough, hypoxia.  CTA chest negative for pulmonary embolism.  Likely secondary to aspiration  "pneumonitis/pneumonia    Elevated proBNP  Patient with elevated proBNP in the absence of chest pain, negative high-sensitivity troponin x2.  Patient does have elevated D-dimer so ruling out PE.  Does not appear to be fluid overloaded no signs of pulmonary edema on chest x-ray.  Likely reactive in the setting of aspiration pneumonia and hypoxia.    ADDENDUM: Patient with 24 beats Vtach. Will check magnesium electrolytes, TTE    Mild intellectual disability  -Patient currently group home resident, prior traumatic brain injury.    Seizure disorder  -Divalproex 2000 mg p.o. q. bedtime    Mood disorder  -Aripiprazole 30 mg p.o. daily  -Sertraline 200 mg p.o. q. bedtime    CONNIE  -? CPAP    Urinary incontinence  -Oxybutynin 15 mg p.o. daily    GERD  -Pantoprazole 40 mg p.o. twice daily     Clinically Significant Risk Factors Present on Admission          # Hypocalcemia: Ca = 8.4 mg/dL (Ref range: 8.6 - 10.0 mg/dL) and/or iCa = N/A on admission, will replace as needed           # Obesity: Estimated body mass index is 31.89 kg/m  as calculated from the following:    Height as of this encounter: 1.6 m (5' 3\").    Weight as of this encounter: 81.6 kg (180 lb).          Electrolytes: Currently within normal limits  Fluids: P.o.  Diet: PO until SLP evaluation  VTE prophylaxis: Lovenox    COVID19 symptom check **/**/2022  Fevers/Chills/myalgias: NEGATIVE TO ALL  Sick contacts/COVID19 exposure: NEGATIVE TO ALL  Cough/trouble breathing/SOB/sore throat: NEGATIVE TO ALL  Diarrhea: NEGATIVE       Expected Discharge Date: 10/19/2022                Subjective  Cc: Coughing    Please see full H&P as documented by Dr. Saini on 10/17/2022 for full physical exam.    Temp:  [97.8  F (36.6  C)-99.1  F (37.3  C)] 97.8  F (36.6  C)  Pulse:  [] 89  Resp:  [19-29] 22  BP: ()/(60-89) 94/62  SpO2:  [93 %-99 %] 93 %      Intake/Output Summary (Last 24 hours) at 10/17/2022 0754  Last data filed at 10/17/2022 0500  Gross per 24 hour "   Intake --   Output 450 ml   Net -450 ml       Results    Recent Results (from the past 24 hour(s))   Extra Blood Culture Bottle    Collection Time: 10/17/22  2:38 AM   Result Value Ref Range    Hold Specimen JIC    Extra Blue Top Tube    Collection Time: 10/17/22  2:38 AM   Result Value Ref Range    Hold Specimen JIC    Extra Red Top Tube    Collection Time: 10/17/22  2:38 AM   Result Value Ref Range    Hold Specimen JIC    Extra Green Top (Lithium Heparin) Tube    Collection Time: 10/17/22  2:38 AM   Result Value Ref Range    Hold Specimen JIC    Extra Purple Top Tube    Collection Time: 10/17/22  2:38 AM   Result Value Ref Range    Hold Specimen JIC    Extra Green Top (Lithium Heparin) ON ICE    Collection Time: 10/17/22  2:38 AM   Result Value Ref Range    Hold Specimen JIC    D dimer quantitative    Collection Time: 10/17/22  2:38 AM   Result Value Ref Range    D-Dimer Quantitative 0.92 (H) 0.00 - 0.50 ug/mL FEU   Basic metabolic panel    Collection Time: 10/17/22  2:38 AM   Result Value Ref Range    Sodium 139 136 - 145 mmol/L    Potassium 4.7 3.4 - 5.3 mmol/L    Chloride 102 98 - 107 mmol/L    Carbon Dioxide (CO2) 27 22 - 29 mmol/L    Anion Gap 10 7 - 15 mmol/L    Urea Nitrogen 8.6 6.0 - 20.0 mg/dL    Creatinine 1.09 (H) 0.51 - 0.95 mg/dL    Calcium 8.4 (L) 8.6 - 10.0 mg/dL    Glucose 113 (H) 70 - 99 mg/dL    GFR Estimate 68 >60 mL/min/1.73m2   Troponin T, High Sensitivity    Collection Time: 10/17/22  2:38 AM   Result Value Ref Range    Troponin T, High Sensitivity 11 <=14 ng/L   Nt probnp inpatient (BNP)    Collection Time: 10/17/22  2:38 AM   Result Value Ref Range    N terminal Pro BNP Inpatient 636 (H) 0 - 450 pg/mL   Blood gas venous    Collection Time: 10/17/22  2:38 AM   Result Value Ref Range    pH Venous 7.30 (L) 7.35 - 7.45    pCO2 Venous 63 (H) 35 - 50 mm Hg    pO2 Venous 20 (L) 25 - 47 mm Hg    Bicarbonate Venous 25 24 - 30 mmol/L    Base Excess/Deficit (+/-) 4.2   mmol/L    Oxyhemoglobin Venous  26.1 (L) 70.0 - 75.0 %    O2 Sat, Venous 26.6 (L) 70.0 - 75.0 %   HCG qualitative Blood    Collection Time: 10/17/22  2:38 AM   Result Value Ref Range    hCG Serum Qualitative Negative Negative   CBC with platelets and differential    Collection Time: 10/17/22  2:38 AM   Result Value Ref Range    WBC Count 10.7 4.0 - 11.0 10e3/uL    RBC Count 4.40 3.80 - 5.20 10e6/uL    Hemoglobin 12.4 11.7 - 15.7 g/dL    Hematocrit 38.2 35.0 - 47.0 %    MCV 87 78 - 100 fL    MCH 28.2 26.5 - 33.0 pg    MCHC 32.5 31.5 - 36.5 g/dL    RDW 14.7 10.0 - 15.0 %    Platelet Count 215 150 - 450 10e3/uL    % Neutrophils 79 %    % Lymphocytes 15 %    % Monocytes 5 %    % Eosinophils 1 %    % Basophils 0 %    % Immature Granulocytes 0 %    NRBCs per 100 WBC 0 <1 /100    Absolute Neutrophils 8.4 (H) 1.6 - 8.3 10e3/uL    Absolute Lymphocytes 1.6 0.8 - 5.3 10e3/uL    Absolute Monocytes 0.5 0.0 - 1.3 10e3/uL    Absolute Eosinophils 0.1 0.0 - 0.7 10e3/uL    Absolute Basophils 0.0 0.0 - 0.2 10e3/uL    Absolute Immature Granulocytes 0.0 <=0.4 10e3/uL    Absolute NRBCs 0.0 10e3/uL   Procalcitonin    Collection Time: 10/17/22  2:38 AM   Result Value Ref Range    Procalcitonin 0.12 (H) <0.05 ng/mL   ECG 12-LEAD WITH MUSE (L[facility])    Collection Time: 10/17/22  2:51 AM   Result Value Ref Range    Systolic Blood Pressure 121 mmHg    Diastolic Blood Pressure 89 mmHg    Ventricular Rate 119 BPM    Atrial Rate 119 BPM    SC Interval 174 ms    QRS Duration 62 ms     ms    QTc 402 ms    P Axis 57 degrees    R AXIS 85 degrees    T Axis 18 degrees    Interpretation ECG       Sinus tachycardia  Nonspecific T wave abnormality  Abnormal ECG  When compared with ECG of 14-MAR-2021 19:39,  Vent. rate has increased BY  56 BPM  Questionable change in QRS axis  Nonspecific T wave abnormality now evident in Inferior leads  Nonspecific T wave abnormality now evident in Lateral leads  Confirmed by SEE ED PROVIDER NOTE FOR, ECG INTERPRETATION ([id number]),  NIKKI LOYA  ([id number]) on 10/17/2022 11:11:45 AM     Symptomatic; Unknown Influenza A/B & SARS-CoV2 (COVID-19) Virus PCR Multiplex Nose    Collection Time: 10/17/22  2:54 AM    Specimen: Nose; Swab   Result Value Ref Range    Influenza A PCR Negative Negative    Influenza B PCR Negative Negative    RSV PCR Negative Negative    SARS CoV2 PCR Negative Negative   Troponin T, High Sensitivity    Collection Time: 10/17/22  4:56 AM   Result Value Ref Range    Troponin T, High Sensitivity 12 <=14 ng/L   Albumin level    Collection Time: 10/17/22  4:56 AM   Result Value Ref Range    Albumin 3.6 3.5 - 5.2 g/dL        IMPRESSION:     1.  No acute pulmonary embolism to the proximal subsegmental level. Peripheral pulmonary arteries obscured by motion artifacts.  2.  Patchy right lung consolidation consistent with pneumonia, including aspiration pneumonia.  3.  Patulous esophagus suggesting dysmotility and small hiatal hernia.  4.  Abnormal morphology of the left ventricle which is stable from prior studies. 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    Lab Results personally reviewed 10/17  Imaging Results personally reviewed 10/17      Stephany Padron DO  Hospitalist Service  [facility]  Text page via [facility] Paging/Directory     This note was created using dragon dictation, any spelling and grammatical errors are unintentional.

## 2022-10-17 NOTE — ED TRIAGE NOTES
Patient arrives to ED via [facility] from [facility] with chief complaint of increased shortness of breath, wheezing and vomiting which started around 1000 yesterday morning.  Patient has history of asthma, attempted to use her Albuterol inhaler x2, but ineffective.  Patient endorses nausea, headache and abdominal pain.  Alert and oriented x4.  Per EMS, patient was 72% on RA upon their arrival, given Neb and went up to 95%.  Per EMS, patient was hypotensive with SBP in 60's.  Blood pressure here 121/89 with no fluid resuscitation.  Patient does sound congested with audible wheezes.

## 2022-10-17 NOTE — PROGRESS NOTES
RESPIRATORY CARE NOTE     Patient Name: Michelle Person  Today's Date: 10/17/2022       Pt to receive duo neb and flutter. BS are dem. Pt is on 2lpm of oxygen via NC, SpO2 is 95%. Post treatment there is no change. RT encouraged deep breathing and coughing techniques .  RT will continue to monitor and assess.

## 2022-10-17 NOTE — PROCEDURES
"PICC Line Insertion Procedure Note  Pt. Name: Michelle Person  MRN:        [id number]    Procedure: Insertion of a  dual Lumen  5 fr  Bard SOLO (valved) Power PICC, Lot number [id number]    Indications: access    Contraindications : none    Procedure Details     Patient identified with 2 identifiers and \"Time Out\" conducted.  .     Central line insertion bundle followed: hand hygiene performed prior to procedure, site cleansed with cholraprep, hat, mask, sterile gloves, sterile gown worn, patient draped with maximum barrier head to toe drape, sterile field maintained.    The vein was assessed and found to be compressible and of adequate size.     Lidocaine 1% 3 ml administered sq to the insertion site. A 5 Fr PICC was inserted into the BASILIC vein of the right arm with ultrasound guidance. 1 attempt(s) required to access vein.   Catheter threaded without difficulty. Good blood return noted.    Modified Seldinger Technique used for insertion.    The 8 sharps that are included in the PICC insertion kit were accounted for and disposed of in the sharps container prior to breakdown of the sterile field.    Catheter secured with Statlock, biopatch and Tegaderm dressing applied.    Findings:    Total catheter length  39 cm, with 3 cm exposed. Mid upper arm circumference is 36.5 cm. Catheter was flushed with 20 cc NS. Patient  tolerated procedure well.    Tip placement verified by [employer] . Tip placement in the SVC.      CLABSI prevention brochure left at bedside.    Patient's primary RN notified PICC is ready for use.      Comments:          [facility] [place]        "

## 2022-10-17 NOTE — PROGRESS NOTES
Speech-Language Pathology: Video Swallow Study     10/17/22 1200   Appointment Info   Signing Clinician's Name / Credentials (SLP) JE Phillips   General Information   Onset of Illness/Injury or Date of Surgery 10/17/22   Pertinent History of Current Problem Michelle Person is a 32 year old female who has hx of major depression, Psychosis, Obesity, asthma, GERD, CONNIE, Seizure, hx of head injury. She lives in a group home  Apparently on 1015 10am was sitting eating chips and aspirated. She had choking spell and vomited and since then has been coughing.  Yesterday developed worsening cough and sob  She states she called EMS herself because she could not breath and could not stop coughing and was wheezing.  She denies fever or chills. She states he aches all over and especially her upper abdomen because of the persistent coughing.  She arrived in Ed hypoxic and is on nasal cannula oxygen.  Imaging reveals Extensive patchy consolidation throughout the right lung particularly of the perihilar region consistent with pneumonia.  Her DDimer was elevated but she currently has no Iv access for Ct contrast and awaiting a PICC line.   General Observations VFSS with esophagram d/t sx's aspiraiton with sx's esophageal dysphagia.   General Swallowing Observations   Swallowing Evaluation Videofluoroscopic swallow study (VFSS)   VFSS Evaluation   VFSS Textures Trialed thin liquids;moderately thick liquids/liquidized;solid foods   VFSS Eval: Thin Liquid Texture Trial   Mode of Presentation, Thin Liquid cup;self-fed   Preparatory Phase WFL   Oral Phase, Thin Liquid premature pharyngeal entry   Bolus Location When Swallow Triggered posterior laryngeal surface of epiglottis   Pharyngeal Phase, Thin Liquid WFL   Rosenbek's Penetration Aspiration Scale: Thin Liquid Trial Results 2 - contrast enters airway, remains above the vocal cords, no residue remains (penetration)  (very shallow and transient penetration caused by mildly delayed  swallow trigger)   VFSS Eval: Moderately Thick Liquids   Mode of Presentation fed by clinician   Preparatory Phase WFL   Oral Phase premature pharyngeal entry   Bolus Location When Swallow Triggered valleculae   Pharyngeal Phase WFL   Rosenbek's Penetration Aspiration Scale 1 - no aspiration, contrast does not enter airway   VFSS Evaluation: Solid Food Texture Trial   Mode of Presentation, Solid fed by clinician   Preparatory Phase WFL   Oral Phase, Solid premature pharyngeal entry   Bolus Location When Swallow Triggered posterior angle of ramus   Pharyngeal Phase, Solid WFL   Rosenbek's Penetration Aspiration Scale: Solid Food Trial Results 1 - no aspiration, contrast does not enter airway   Esophageal Phase of Swallow   Esophageal sweep performed during today s vidofluoroscopic exam  Please refer to radiologist's report for details;Other (comments)  (see results of esophagram following this assessment.  Pt diagnosed with signifcant reflux)   Swallowing Recommendations   Diet Consistency Recommendations other (see comments);thin liquids (level 0);regular diet  (with swallow strategies d/t esophageal dysphagia/reflux)   Mode of Delivery Recommendations bolus size, small   Monitoring/Assistance Required (Eating/Swallowing) monitor for cough or change in vocal quality with intake   Recommended Feeding/Eating Techniques (Swallow Eval) maintain upright posture during/after eating for 30 minutes;provide 6 smaller meals throughout day   Comment, Swallowing Recommendations will start with regular textures and ok for thin liquids and follow up with pt to teach esophageal swallow strategies of sitting fully upright for all oral intake, small bites of food, smalller meals throughout the day and to remain sitting upright 20-30 minutes after oral intake.   General Therapy Interventions   Planned Therapy Interventions Dysphagia Treatment   Dysphagia treatment Instruction of safe swallow strategies   Clinical Impression    Criteria for Skilled Therapeutic Interventions Met (SLP Eval) Yes, treatment indicated   SLP Diagnosis esophageal dysphagia   Clinical Impression Comments Pt did not aspirate during VFSS but did have very shallow, transient penetration with thin liquids d/t delayed epiglottic tilt. This did not appear to be problematic in re: safety of swallow.Epiglottis completely inverted and there was no pharyngeal stasis with thin or solid.  Pt did have an esophagram that followed this assessment showing significant reflux. See radiologist report.   SLP Total Evaluation Time   Evaluation, videofluoroscopic eval of swallow function Minutes (64432) 15   SLP Goals   Therapy Frequency (SLP Eval) 5 times/wk   SLP Predicted Duration/Target Date for Goal Attainment 10/24/22   SLP Goals Swallow   SLP: Safely tolerate diet without signs/symptoms of aspiration Regular diet;Thin liquids;With use of compensatory swallow strategies;Independently   Total Session Time   Total Session Time (sum of timed and untimed services) 15

## 2022-10-17 NOTE — PROGRESS NOTES
RESPIRATORY CARE NOTE     Patient Name: Michelle Person  Today's Date: 10/17/2022       Pt continues to receive duo neb and flutter. BS are dem. Pt is on 1.5lpm of oxygen via NC, SpO2 is 93%. Post treatment there is increased aeration. Pt also perceives improvement.  RT encouraged deep breathing and coughing techniques .  RT will continue to monitor and assess.

## 2022-10-17 NOTE — PROGRESS NOTES
"Updated Swallow Status: clear liquid diet recommendation       10/17/22 1300   Appointment Info   Signing Clinician's Name / Credentials (SLP) JE Phillips   Swallowing Dysfunction &/or Oral Function for Feeding   Treatment of Swallowing Dysfunction &/or Oral Function for Feeding Minutes (44023) 30   Treatment Detail/Skilled Intervention Post VFSS with esophagram meal observation completed to determine effectiveness of safe swallow strategies given new diagnosis of significant esophageal reflux (to the level of the pharynx) that can increase aspiration risk of reflux. Pt had rice, yogurt, juice and a brownie. She was educated, prior to po intake, to follow strategies of sitting fully upright, small bites, eat slow and eat small amount throughout the day instead of 3 big meals during the day and to stay upright for 20-30 minutes after small meals. She took a few small bites of her yogurt and a few small bites of her rice followed with sips of her juice. She needed cues to slow down. She eventually started coughing and O2 sats dropped to 87%. She then c/o tightness in her throat that moved to her chest and then she stated that it felt like it was (pointing to her (R) lung base area. I asked her about her eating habit at home and she states she eats in a kitchen chair and then she takes a nap right after she eats. I asked if when she \"choked\" on the chips, did it happen when she laid down after eating them and she stated that it happened when she was lying down. Of note: during my meal observation, pt was sitting as upright as possible in ED room cart - not the best position. Given her sx's with eating, pt most likely aspirating refluxed food or at high risk based on results of limited esophagram today and her sx's. RECOMMEND diet of clear liquids with reflux precautions and consider GI consult re: reflux and PPI. ST will follow pt re: compensatory strategies.   SLP Discharge Planning   SLP Plan follow up re: " compenstatory strategies for reflux. Now on clear liquids d/t severity   SLP Discharge Recommendation home with assist   SLP Rationale for DC Rec pt with significant reflux and now on clear liquids. needs to follow reflux precautions.   SLP Brief overview of current status  pt showing sx's aspiration of reflux; on clear liquids.   Total Session Time   Total Session Time (sum of timed and untimed services) 30

## 2022-10-17 NOTE — PHARMACY-ADMISSION MEDICATION HISTORY
Pharmacy Note - Admission Medication History    Pertinent Provider Information: none     ______________________________________________________________________    Prior To Admission (PTA) med list completed and updated in EMR.       PTA Med List   Medication Sig Last Dose     albuterol (PROAIR HFA) 108 (90 Base) MCG/ACT inhaler Inhale 2 puffs into the lungs daily as needed for wheezing (Patient taking differently: Inhale 2 puffs into the lungs every 6 hours as needed for wheezing) Unknown     ARIPiprazole (ABILIFY) 30 MG tablet Take 1 tablet (30 mg) by mouth daily 10/16/2022 at am     diphenhydrAMINE (BENADRYL) 25 MG tablet Take 25 mg by mouth daily as needed for itching or allergies Unknown     divalproex sodium delayed-release (DEPAKOTE) 500 MG DR tablet Take 4 tablets (2,000 mg) by mouth At Bedtime 10/16/2022 at pm     docusate sodium (COLACE) 100 MG capsule Take 1 capsule (100 mg) by mouth daily 10/16/2022 at am     fish oil-omega-3 fatty acids 1000 MG capsule Take 1 capsule (1 g) by mouth 3 times daily 10/16/2022     fluticasone (ARNUITY ELLIPTA) 100 MCG/ACT inhaler Inhale 1 puff into the lungs daily (Patient taking differently: Inhale 1 puff into the lungs 2 times daily) 10/16/2022 at pm     gabapentin (NEURONTIN) 300 MG capsule Take 300 mg by mouth daily as needed (tremor) Unknown     ketoconazole (NIZORAL) 2 % external cream Apply 1 Application topically 2 times daily as needed Apply to skin fold Unknown     medroxyPROGESTERone (DEPO-PROVERA) 150 MG/ML IM injection Inject 1 mL (150 mg) into the muscle every 3 months Last injection was on 12/10/20 per group home Unknown     montelukast (SINGULAIR) 10 MG tablet Take 1 tablet (10 mg) by mouth At Bedtime 10/16/2022 at pm     OLANZapine (ZYPREXA) 5 MG tablet Take 5 mg by mouth daily as needed (agitation anxiety) Unknown     oxybutynin ER (DITROPAN XL) 15 MG 24 hr tablet Take 1 tablet (15 mg) by mouth daily (Patient taking differently: Take 15 mg by mouth daily At  midday) 10/16/2022 at midday     pantoprazole (PROTONIX) 40 MG EC tablet Take 1 tablet (40 mg) by mouth daily (Patient taking differently: Take 40 mg by mouth 2 times daily) 10/16/2022 at pm     polyethylene glycol (MIRALAX) 17 g packet Take 17 g by mouth daily as needed for constipation Unknown     SENNA-docusate sodium (SENNA S) 8.6-50 MG tablet Take 1 tablet by mouth At Bedtime 10/16/2022 at pm     sennosides (SENOKOT) 8.6 MG tablet Take 1 tablet by mouth 2 times daily as needed for constipation Unknown     sertraline (ZOLOFT) 100 MG tablet Take 2 tablets (200 mg) by mouth At Bedtime 10/16/2022 at pm     Vitamin D3 (CHOLECALCIFEROL) 25 mcg (1000 units) tablet Take 1 tablet (25 mcg) by mouth daily 10/16/2022 at am       Information source(s): Caregiver and Facility (TCU/NH/) medication list/MAR from [address] [phone]  Method of interview communication: phone    Summary of Changes to PTA Med List  New: none  Discontinued: docusate bid order, prn zofran, prn tylenol  Changed: protonix qday to bid, and arnuity inhaler qday to bid    Patient was asked about OTC/herbal products specifically.  PTA med list reflects this.    In the past week, patient estimated taking medication this percent of the time:  greater than 90%.    Allergies were reviewed, assessed, and updated with the patient.      Patient did not bring any medications to the hospital and can't retrieve from home. No multi-dose medications are available for use during hospital stay.     The information provided in this note is only as accurate as the sources available at the time of the update(s).    Thank you for the opportunity to participate in the care of this patient.    Tyrese Campbell RPH  10/17/2022 10:09 AM

## 2022-10-18 NOTE — PLAN OF CARE
Problem: Plan of Care - These are the overarching goals to be used throughout the patient stay.    Goal: Absence of Hospital-Acquired Illness or Injury  Intervention: Identify and Manage Fall Risk  Recent Flowsheet Documentation  Taken 10/17/2022 1600 by Napoleon Salcedo RN  Safety Promotion/Fall Prevention:    activity supervised    nonskid shoes/slippers when out of bed    clutter free environment maintained    fall prevention program maintained    patient and family education     Problem: Pain Acute  Goal: Optimal Pain Control and Function  Outcome: Progressing   Goal Outcome Evaluation:    PRN Tylenol given per MAR for pain. Educated patient on use of incentive spirometer and importance of using it.

## 2022-10-18 NOTE — PROGRESS NOTES
Patient seen in hospital with two pressure ulcers on the left foot: one at lateral malleolus and other at heel. Both Stage 2, down to subcutaneous tissue, almost Stage 3. Treatment with Santyl now to try to get eschar loosened and off. No c/o. He is home. Has a recliner. He is not wearing stockings. EXAM:  Left transmet amputation looks good. Both tibia with +1 pitting edema  Dorsum of left foot also appears more edematous    Left Lateral malleolar ulcer had some yellow exudate that was removed with forceps  Base looking better, 5 mm square    Heel with thicker eschar, debrided with scissors, some bleeding noted  7 x 9 mm    Both dressed with silvadene, telfa. Wrap and then an Ace to decrease edema  Discussed stockings and more elevation to decrease edema. PLAN:  Continue Santyl on heel but change lateral ulcer to silvadene and telfa every other day. RTO 3 weeks. Pt reports 5/10 leg pain from sleeping position, requested tylenol which relieved pain. VSS on 1L O2 nasal cannula, AOx4. Sleeping throughout the night. Clear liquid diet.

## 2022-10-18 NOTE — CONSULTS
GI CONSULT NOTE      Name: Michelle Person  Medical Record #: [id number]  YOB: 1990  Date of Admission: 10/17/2022  Date/Time: 10/18/2022/8:04 AM     CHIEF COMPLAINT: Cough, SOB    HISTORY OF PRESENT ILLNESS: We were asked to see Michelle Person by Dr. Padron for aspiration, abnormal esophagram.      Michelle Person is a 32 year old year old female with history of asthma, GERD, CONNIE, depression, obesity, seizure, psychosis, TBI who resides in a group home. She is admitted with aspiration pneumonia on IV Zosyn. She was eating cheetos while lying down and choked and coughed. She vomited and then could not stop coughing yesterday, so she called EMS. She was brought to the ED and was hypoxic on arrival. Chest imaging showed right sided pneumonia.     She underwent a video swallow study which showed slight penetration but no aspiration. Esophagram was performed, but limited due to coughing during procedure. This showed reflux to the thoracic inlet and a patulous esophagus.     At home, she takes pantoprazole 40mg twice daily (unknown duration). She reports breakthrough heartburn symptoms such as burning substernal chest pain and taste of acid in the back of her throat. She reports she often feels as though food gets stuck in the back of her throat and in her chest. Sometimes, she vomits food that she feels got stuck, but cannot tell me frequency of these occurrences.     REVIEW OF SYSTEMS (ROS): Complete review of systems negative other than listed in HPI.    PAST MEDICAL HISTORY:  Past Medical History:   Diagnosis Date     Asthma      Depression      Morbid obesity (H)      MR (mental retardation)      Psychosis (H)      Seizures (H)      Sleep apnea     been refusing to wear cpap machine at group home     Suicidal ideation      Suicide attempt (H)       FAMILY HISTORY:  Family History   Problem Relation Age of Onset     Hypertension Mother      SOCIAL HISTORY:  Social History     Socioeconomic History  "    Marital status: Single     Spouse name: Not on file     Number of children: Not on file     Years of education: Not on file     Highest education level: Not on file   Occupational History     Not on file   Tobacco Use     Smoking status: Former     Smokeless tobacco: Never   Substance and Sexual Activity     Alcohol use: No     Drug use: No     Sexual activity: Not on file   Other Topics Concern     Not on file   Social History Narrative    The patient lives in a foster home.      Social Determinants of Health     Financial Resource Strain: Not on file   Food Insecurity: Not on file   Transportation Needs: Not on file   Physical Activity: Not on file   Stress: Not on file   Social Connections: Not on file   Intimate Partner Violence: Not on file   Housing Stability: Not on file     MEDICATIONS PRIOR TO ADMISSION: (Not in a hospital admission)       ALLERGIES: Ibuprofen and Tomato    PHYSICAL EXAM:    /72 (BP Location: Left arm, Patient Position: Left side)   Pulse 73   Temp 98.7  F (37.1  C) (Oral)   Resp 22   Ht 1.6 m (5' 3\")   Wt 81.6 kg (180 lb)   SpO2 95%   BMI 31.89 kg/m      GENERAL: Pleasant, no obvious distress  NECK: Supple without adenopathy  EYES: No scleral icterus  LUNGS: Clear to auscultation bilaterally  HEART: Regular rate and rhythm, S1 and S2 present, no lower extremity edema  ABDOMEN: Non-distended. Positive bowel sounds. Soft, non-tender, no guarding/rebound/mass, no obvious organomegaly  MUSKULOSKELETAL:  Warm and well perfused, moves all extremities well  SKIN: No jaundice  NEUROLOGIC: Alert and oriented  PSYCHIATRIC: Normal affect    LAB DATA:  CMP Results:   Recent Labs   Lab Test 10/18/22  0638 10/17/22  1701 10/17/22  0238 10/11/22  0806 09/27/22  1125 09/06/22  2050 12/22/21  0803    138 139 139   < > 136  --    POTASSIUM 3.9 4.2 4.7 4.7   < > 4.4  --    CHLORIDE 101 100 102 101   < > 101  --    CO2 29 27 27 32*   < > 26  --    ANIONGAP 10 11 10 6*   < > 9  --    GLC " 111* 143* 113* 85   < > 98 75   BUN 9.9 8.8 8.6 18.6   < > 13  --    CR 1.00* 0.91 1.09* 1.11*   < > 1.08  --    BILITOTAL  --   --   --  0.2  --  0.2 0.3   ALKPHOS  --   --   --  46  --  48 49   ALT  --   --   --  13  --  <9 16   AST  --   --   --  18  --  18 18    < > = values in this interval not displayed.      CBC  Recent Labs   Lab 10/18/22  0638 10/17/22  0238 10/11/22  0806   WBC 12.8* 10.7 6.6   RBC 3.88 4.40 4.58   HGB 10.9* 12.4 12.7   HCT 33.0* 38.2 39.4   MCV 85 87 86   MCH 28.1 28.2 27.7   MCHC 33.0 32.5 32.2   RDW 14.6 14.7 13.9    215 192     INRNo lab results found in last 7 days.   Lipase   Date Value Ref Range Status   10/11/2022 17 13 - 60 U/L Final   09/06/2022 15 0 - 52 U/L Final   03/14/2021 11 0 - 52 U/L Final     IMAGING:  EXAM: XR ESOPHAGRAM  LOCATION: [facility]  DATE/TIME: 10/17/2022 12:00 PM     INDICATION: feeling food gets stuck and won't go down.  COMPARISON: None.  TECHNIQUE: Routine.     FINDINGS:  FLUOROSCOPIC TIME: 1.2 minutes  NUMBER OF IMAGES: 2 nearly     ESOPHAGUS: Modified, limited esophagram was due to patient's status.     A double contrast esophagram was performed. Esophagus is quite patulous. No evidence of achalasia.     The table was converted from upright to horizontal with the patient prone, she started coughing and wheezing. Fluoroscopy at this time demonstrated reflux of barium to the upper esophagus.      Study terminated at this time.                                          IMPRESSION:  1.  Very limited esophagram as noted above.   2.  There is spontaneous reflux to the level of the thoracic inlet.  3.  Esophagus is quite patulous.  4.  No obvious, large hernia.    EXAM: XR VIDEO SWALLOW WITH SLP OR OT  LOCATION: [facility]  DATE/TIME: 10/17/2022 11:59 AM     INDICATION: Difficulty swallowing. Choking episode.  COMPARISON: None.     TECHNIQUE: Routine swallow study with speech pathology using multiple  barium thicknesses.     FINDINGS:   FLUOROSCOPIC TIME: 1.1 minutes  NUMBER OF IMAGES: 4 cine loops     Swallow study with Speech Pathology using multiple barium thicknesses.      Transient episodes of penetration with thin barium. No aspiration.     Prominent hypertrophic spurring at C5-C6 anteriorly with slight mass effect along the posterior esophagus.      EXAM: CTA ABDOMEN PELVIS WITH CONTRAST  LOCATION: [facility]  DATE/TIME: 10/11/2022 10:01 AM     INDICATION: bloody stool, abd pain  COMPARISON: CT of the abdomen and pelvis 9/6/2022  TECHNIQUE: CT angiogram abdomen pelvis during arterial phase of injection of IV contrast. 2D and 3D MIP reconstructions were performed by the CT technologist. Dose reduction techniques were used.  CONTRAST: Isovue 370    100ml     FINDINGS:  ANGIOGRAM ABDOMEN/PELVIS: Normal caliber abdominal aorta and iliac arteries. No atheromatous calcifications are present. There is no arterial blush within the bowel lumen or accumulation of luminal contrast on portal venous phase to suggest a source of   acute gastrointestinal hemorrhage.     LOWER CHEST: There are no actionable abnormalities in the imaged basal chest. Small hiatal hernia is present.     HEPATOBILIARY: Normal.     PANCREAS: Normal.     SPLEEN: Normal.     ADRENAL GLANDS: Normal.     KIDNEYS/BLADDER: Normal.     BOWEL: Normal with no obstruction or acute inflammatory change. Nothing for appendicitis. No focal bowel wall thickening or abnormal mucosal enhancement.     LYMPH NODES: Normal.     PELVIC ORGANS: Left ovary contains a 22 x 31 mm physiologic follicle. Right ovary is normal. Uterus is normal.     MUSCULOSKELETAL: Normal bone mineralization. No fractures or bone lesions.                                       IMPRESSION:     Normal lower thoracic and abdominal aorta and mesenteric vessels. No findings to suggest a source of acute lower gastrointestinal hemorrhage.    ASSESSMENT:  1. GERD  2.  Dysphagia  32 year old female admitted with what appears to be aspiration pneumonia, hypoxia with history of dysphagia symptoms and findings of reflux on recent esophagram. Symptoms may be due to GERD, esophageal motility disorder, less likely esophageal stricture. Would not recommend EGD at this time given pneumonia and hypoxia. Also, doubt this would change her medical management or clinical course. Instead, recommend treatment with alternative PPI and strict reflux precautions, including sitting up while eating all meals and avoid lying down for 3-4 hours after eating.     I would recommend outpatient EGD and follow-up in our esophageal clinic. She may eventually need esophageal motility study, but this is an outpatient only exam.     PLAN:  1. Stop pantoprazole  2. Start omeprazole 40mg twice daily, 30 min before breakfast and dinner. Ok'd with pharmacy.   3. Diet per SLP  4. Eat all meals sitting up, no lying down for 3-4 hours after each meal  5. Outpatient EGD and esophageal clinic follow-up, our office will arrange.     Discussed with Dr. Ernandez who will also visit with the patient. Also discussed with nursing staff, Dr. Padron, left message for SLP.     TIME SPENT: 45 min including chart review, patient interview and care coordination.                                                Akiko Lin PA-C  Thank you for the opportunity to participate in the care of this patient.   Please feel free to call me with any questions or concerns.  Phone number [phone].            The patient was seen and evaluated in conjunction with Akiko Lin. Please see her note for details.     Briefly, Michelle is a pleasant 31 Y/o women with long standing heartburn/reflux symptoms who we are consulted on for reflux, heartburn, aspiration and dysphagia. Gets breakthrough heartburn despite pantoprazole. Gets intermittent dysphagia to solids and liquids. Here with aspiration pneumonia. Will plan for outpatient EGD to  evaluate symptoms further. PPI adjustment for now as above. May need esophageal manometry ultimately.     Travis Ernandez DO  10/18/86142:27 PM  [facility]    Approximately 15 minutes of total time was spent providing patient care, including patient evaluation, reviewing documentation/test results and .

## 2022-10-18 NOTE — PROGRESS NOTES
Patient is seen for a neb treatment and a bronchial hygiene. Respiratory distress not noted. Duoneb given. BS diminished pre/post treatment. Pt tolerates flutter valve. Deep breath and coughing encouraged. RT following.    Manolo Ferguson, RT

## 2022-10-18 NOTE — PLAN OF CARE
Problem: Plan of Care - These are the overarching goals to be used throughout the patient stay.    Goal: Optimal Comfort and Wellbeing  Outcome: Progressing     Problem: Pain Acute  Goal: Optimal Pain Control and Function  Outcome: Progressing  Intervention: Prevent or Manage Pain-no complaints of pain currently - understands pain scale (0-10) and medicate as per mar  Problem: Gas Exchange Impaired  Goal: Optimal Gas Exchange  Outcome: Progressing-on 1 liter oxygen and wean as able     Recent Flowsheet Documentation  Taken 10/18/2022 1311 by Ngozi Lee RN  Medication Review/Management: medications reviewed   Goal Outcome Evaluation:  Patient able to ambulate in room - instructions given to call if needs anything-double lumen picc

## 2022-10-18 NOTE — PLAN OF CARE
"  Problem: Plan of Care - These are the overarching goals to be used throughout the patient stay.    Goal: Plan of Care Review  Description: The Plan of Care Review/Shift note should be completed every shift.  The Outcome Evaluation is a brief statement about your assessment that the patient is improving, declining, or no change.  This information will be displayed automatically on your shift note.  Outcome: Progressing  Flowsheets (Taken 10/18/2022 1032)  Plan of Care Reviewed With: patient  Goal: Patient-Specific Goal (Individualized)  Description: You can add care plan individualizations to a care plan. Examples of Individualization might be:  \"Parent requests to be called daily at 9am for status\", \"I have a hard time hearing out of my right ear\", or \"Do not touch me to wake me up as it startles me\".  Outcome: Progressing  Goal: Absence of Hospital-Acquired Illness or Injury  Outcome: Progressing  Goal: Optimal Comfort and Wellbeing  Outcome: Progressing  Goal: Readiness for Transition of Care  Outcome: Progressing     Problem: Pain Acute  Goal: Optimal Pain Control and Function  Outcome: Progressing   Goal Outcome Evaluation:      Plan of Care Reviewed With: patient           Pt is alert and oriented x4. Pt is med complaint. Pt denies pain. Pt's v/s is stable. Picc is patent. Pt are her breakfast with no difficulty. Continue to monitor pt.      "

## 2022-10-18 NOTE — PROGRESS NOTES
Assessment & Plan   32-year-old female with seizure disorder, mild intellectual disability, asthma, CONNIE, mood disorder presents with aspiration pneumonia and acute hypoxic respiratory failure    Principal Problem:    Hypoxia  Active Problems:    Mood disorder (H)    Acute asthma exacerbation    Overactive bladder    Acute respiratory failure with hypoxia (H)    Gastroesophageal reflux disease without esophagitis    Sleep apnea    Aspiration pneumonitis (H)    Elevated brain natriuretic peptide (BNP) level    Present on Admission:    Aspiration pneumonitis (H)    Hypoxia    Acute respiratory failure with hypoxia (H)    Sleep apnea    Overactive bladder      Aspiration pneumonia, acute hypoxic and hypercapnic respiratory failure  -Patient presented with worsening cough and shortness of breath after witnessed episode of aspiration.  Chest x-ray 10/17/2022 with extensive patchy consolidation throughout the right lung particularly in the perihilar region consistent with pneumonia.  Patient otherwise without leukocytosis, hemodynamically stable without fevers, mildly elevated procalcitonin, no signs of sepsis.  COVID and influenza negative.  CTA chest does suggest some esophageal motility issues.  Patient, though, mild acidosis on blood gas second to hypercapnia although oxygen needs have been decreasing and currently only on 1/2 L nasal cannula  -Stop zosyn IV, start augmentin po BID  -Attempt sputum culture  -Wean oxygen as able  -Incentive spirometry plus flutter valve  -SLP following, feel that aspiration likely cause by refluxed food from lying down after eating.    Acute asthma exacerbation  -Patient presenting with wheezing, hypoxia in the setting of aspiration and new asthma.  Hypoxia and wheezing have both improved  -DuoNeb 4 times daily  -Continue home fluticasone twice daily  -Prednisone 40 mg p.o. daily x5  -Montelukast 10 mg p.o. daily  -Pulmonary hygiene as above    GERD, esophageal reflux  -SLP evaluation  "with esophagram on 10/17 with spontaneous reflux to the level of thoracic inlet, patulous esophagus.  Swallow therapy with concern was for aspiration of refluxed food  -Pantoprazole stopped by GI, will use omperazole po BID in it's place  -Outpatient EGD and possible manometry   -GI consulted, appreciate recommendations    Leukocytosis  -Patient with mild leukocytosis, unlikely secondary to infection and likely secondary to prednisone for asthma exacerbation.     Elevated D-dimer  -Elevated D-dimer 0.92 in the setting of cough, hypoxia.  CTA chest negative for pulmonary embolism.  Likely secondary to aspiration pneumonitis/pneumonia     Elevated proBNP  -Patient with elevated proBNP in the absence of chest pain, negative high-sensitivity troponin x2.  Patient does have elevated D-dimer so ruling out PE.  Does not appear to be fluid overloaded no signs of pulmonary edema on chest x-ray.  TTE 10/17/2022 with ejection fraction of 60 to 65%  abnormalities.  Likely reactive in the setting of aspiration pneumonia and hypoxia.     Ventricular tachycardia  -Patient with 24 beats of V. tach on 10/17 while in the emergency department.  TTE normal, troponin negative x3, magnesium actually elevated and other electrolytes normal.  Seem to be associated with coughing fit     Mild intellectual disability  -Patient currently group home resident, prior traumatic brain injury.     Seizure disorder  -Divalproex 2000 mg p.o. q. bedtime     Mood disorder  -Aripiprazole 30 mg p.o. daily  -Sertraline 200 mg p.o. q. bedtime     CONNIE  -? CPAP     Urinary incontinence  -Oxybutynin 15 mg p.o. daily       Clinically Significant Risk Factors Present on Admission               # Obesity: Estimated body mass index is 31.89 kg/m  as calculated from the following:    Height as of this encounter: 1.6 m (5' 3\").    Weight as of this encounter: 81.6 kg (180 lb).          Electrolytes: Magnesium 2.5, corrected calcium 8.9  Fluids: po  Diet: clear " liquid  VTE prophylaxis: lovenox    COVID19 symptom check 10/18/2022  Fevers/Chills/myalgias: NEGATIVE TO ALL  Sick contacts/COVID19 exposure: NEGATIVE TO ALL  Cough/trouble breathing/SOB/sore throat: NEGATIVE TO ALL  Diarrhea: NEGATIVE       Expected Discharge Date: 10/19/2022    Discharge Delays: Oxygen Needs - Arrange Home O2             Subjective  Cc: coughing    Pt is new to be today.  Personally conducted extensive chart review prior to visit including labs, imaging, past provider notes and interventions.  Patient says she is feeling much better today but still experiencing some wheezing.  Denies any abdominal pain, difficulty swallowing, chest pain, nausea or vomiting.    Review of Systems: History obtained from the patient  General ROS: negative  for  - chills, fatigue, fever  ENT ROS: negative for - headaches, hearing change, nasal congestion, nasal discharge, sore throat  Hematological and Lymphatic ROS: negative for - bleeding problems, blood clots, bruising  Respiratory ROS: negative for - cough, pleuritic pain, shortness of breath, positive for wheezing  Cardiovascular ROS: negative for - chest pain, dyspnea on exertion, edema  Gastrointestinal ROS: negative for - abdominal pain, constipation, diarrhea, and nausea/vomiting  Genito-Urinary ROS: negative for - dysuria, hematuria  Musculoskeletal ROS: negative for - gait disturbance, muscle pain  Neurological ROS: negative for - behavioral changes, confusion, dizziness, numbness/tingling   Dermatological ROS: negative for pruritus, rash    Objective    Physical Examination:   General appearance - alert, well appearing, and in no distress and oriented to person, place, and time  Mental status - alert, oriented to person, place, and time, normal mood, behavior, speech, dress, motor activity, patient somewhat childlike in her answers and does laugh easily and asks multiple times if I can keep her company because she feels lonely.  HEENT - sclera anicteric,  left eye normal, right eye normal, nares normal and patent, no erythema, hypertrophic upper gums in the mouth with also some hyperpigmentation  Respiratory -coarse bilateral breath sounds but no auscultated wheezes, crackles or rhonchi.  Nasal cannula in place.  Cardiac - normal rate, regular rhythm, normal S1, S2, no murmurs, rubs, clicks or gallops, no JVD  Abdomen - soft, nontender, nondistended, no masses or organomegaly no rebound tenderness noted bowel sounds normal, no bladder distension noted  Neurological - alert, oriented, normal speech, no focal findings or movement disorder noted  Musculoskeletal - no joint tenderness, deformity or swelling, full range of motion without pain  Extremities - peripheral pulses normal, no pedal edema, no clubbing or cyanosis  Skin - normal coloration and turgor, no rashes, no suspicious skin lesions noted    Temp:  [97.4  F (36.3  C)-98.7  F (37.1  C)] 97.4  F (36.3  C)  Pulse:  [69-76] 70  Resp:  [18-22] 18  BP: (102-109)/(56-80) 109/80  SpO2:  [91 %-98 %] 91 %      Intake/Output Summary (Last 24 hours) at 10/18/2022 0839  Last data filed at 10/18/2022 0400  Gross per 24 hour   Intake 780 ml   Output 1450 ml   Net -670 ml       Results    Recent Results (from the past 24 hour(s))   Echocardiogram Complete    Collection Time: 10/17/22  3:39 PM   Result Value Ref Range    LVEF  60-65%    Magnesium    Collection Time: 10/17/22  5:01 PM   Result Value Ref Range    Magnesium 2.5 (H) 1.7 - 2.3 mg/dL   Basic metabolic panel    Collection Time: 10/17/22  5:01 PM   Result Value Ref Range    Sodium 138 136 - 145 mmol/L    Potassium 4.2 3.4 - 5.3 mmol/L    Chloride 100 98 - 107 mmol/L    Carbon Dioxide (CO2) 27 22 - 29 mmol/L    Anion Gap 11 7 - 15 mmol/L    Urea Nitrogen 8.8 6.0 - 20.0 mg/dL    Creatinine 0.91 0.51 - 0.95 mg/dL    Calcium 8.8 8.6 - 10.0 mg/dL    Glucose 143 (H) 70 - 99 mg/dL    GFR Estimate 85 >60 mL/min/1.73m2   Basic metabolic panel    Collection Time: 10/18/22   6:38 AM   Result Value Ref Range    Sodium 140 136 - 145 mmol/L    Potassium 3.9 3.4 - 5.3 mmol/L    Chloride 101 98 - 107 mmol/L    Carbon Dioxide (CO2) 29 22 - 29 mmol/L    Anion Gap 10 7 - 15 mmol/L    Urea Nitrogen 9.9 6.0 - 20.0 mg/dL    Creatinine 1.00 (H) 0.51 - 0.95 mg/dL    Calcium 8.3 (L) 8.6 - 10.0 mg/dL    Glucose 111 (H) 70 - 99 mg/dL    GFR Estimate 76 >60 mL/min/1.73m2   CBC with platelets    Collection Time: 10/18/22  6:38 AM   Result Value Ref Range    WBC Count 12.8 (H) 4.0 - 11.0 10e3/uL    RBC Count 3.88 3.80 - 5.20 10e6/uL    Hemoglobin 10.9 (L) 11.7 - 15.7 g/dL    Hematocrit 33.0 (L) 35.0 - 47.0 %    MCV 85 78 - 100 fL    MCH 28.1 26.5 - 33.0 pg    MCHC 33.0 31.5 - 36.5 g/dL    RDW 14.6 10.0 - 15.0 %    Platelet Count 178 150 - 450 10e3/uL   Albumin level    Collection Time: 10/18/22  6:38 AM   Result Value Ref Range    Albumin 3.2 (L) 3.5 - 5.2 g/dL      Interpretation Summary     Left ventricular size, wall motion and function are normal. The ejection  fraction is 60-65%.  Normal right ventricle size and systolic function.  No obvious valvular disease.     No previous study for comparison.    IMPRESSION:  1.  Very limited esophagram as noted above.   2.  There is spontaneous reflux to the level of the thoracic inlet.  3.  Esophagus is quite patulous.  4.  No obvious, large hernia.      Lab Results personally reviewed 10/18  Imaging Results personally reviewed 10/18  Discussed with patient  Reviewed old records     Advanced Care Planning  Discharge Planning discussed with patient  Discussed care with patient for 25 minutes with greater than 50% of time spent in counseling and coordination of care.    Stephany Padron DO  Hospitalist Service  [facility]  Text page via [email or web address] Paging/Directory     This note was created using dragon dictation, any spelling and grammatical errors are unintentional.

## 2022-10-19 NOTE — PROGRESS NOTES
"GI PROGRESS NOTE  10/19/2022  Michelle Person  1990  /-73    Subjective:   Feeling better, wants to go home. Tolerating regular diet, still with some coughing after eating.      Objective:     Blood pressure 116/55, pulse 60, temperature 98.7  F (37.1  C), temperature source Oral, resp. rate 16, height 1.6 m (5' 3\"), weight 81.6 kg (180 lb), SpO2 95 %, not currently breastfeeding.    Body mass index is 31.89 kg/m .  Gen: NAD  Eyes: No jaundice  Cardio: RRR  GI: Non-distended, BS positive, soft, non-tender  Skin: No jaundice  Neuro: Alert and orientated    Laboratory:  BMP  Recent Labs   Lab 10/19/22  0539 10/18/22  0638 10/17/22  1701 10/17/22  0238   NA  --  140 138 139   POTASSIUM 4.3 3.9 4.2 4.7   CHLORIDE  --  101 100 102   UJLIÁN  --  8.3* 8.8 8.4*   CO2  --  29 27 27   BUN  --  9.9 8.8 8.6   CR  --  1.00* 0.91 1.09*   GLC  --  111* 143* 113*     CBC  Recent Labs   Lab 10/18/22  0638 10/17/22  0238   WBC 12.8* 10.7   RBC 3.88 4.40   HGB 10.9* 12.4   HCT 33.0* 38.2   MCV 85 87   MCH 28.1 28.2   MCHC 33.0 32.5   RDW 14.6 14.7    215     INRNo lab results found in last 7 days.   LFTs  Recent Labs   Lab Test 10/18/22  0638 10/17/22  0456 10/11/22  0806 10/11/22  0753 09/13/22  1204 09/06/22  2050 09/06/22  1939 12/22/21  0803 07/12/21  0215 06/25/21  0812 03/14/21  1833 08/22/20  1925 07/22/20  0919   ALBUMIN 3.2* 3.6 4.3  --   --  3.8  --  3.7  --  3.6 3.9   < > 3.8   BILITOTAL  --   --  0.2  --   --  0.2  --  0.3  --  0.3 0.2   < > 0.2   ALT  --   --  13  --   --  <9  --  16  --  12 17   < > 21   AST  --   --  18  --   --  18  --  18  --  14 25   < > 20   PROTEIN  --   --   --  Negative Negative  --  Negative  --    < >  --   --    < >  --    LIPASE  --   --  17  --   --  15  --   --   --   --  11  --   --    AMYLASE  --   --   --   --   --   --   --  60  --  64  --   --  62    < > = values in this interval not displayed.     Imaging:  EXAM: XR ESOPHAGRAM  LOCATION: [facility]  DATE/TIME: 10/17/2022 12:00 PM     INDICATION: feeling food gets stuck and won't go down.  COMPARISON: None.  TECHNIQUE: Routine.     FINDINGS:  FLUOROSCOPIC TIME: 1.2 minutes  NUMBER OF IMAGES: 2 nearly     ESOPHAGUS: Modified, limited esophagram was due to patient's status.     A double contrast esophagram was performed. Esophagus is quite patulous. No evidence of achalasia.     The table was converted from upright to horizontal with the patient prone, she started coughing and wheezing. Fluoroscopy at this time demonstrated reflux of barium to the upper esophagus.      Study terminated at this time.                                          IMPRESSION:  1.  Very limited esophagram as noted above.   2.  There is spontaneous reflux to the level of the thoracic inlet.  3.  Esophagus is quite patulous.  4.  No obvious, large hernia.     EXAM: XR VIDEO SWALLOW WITH SLP OR OT  LOCATION: [facility]  DATE/TIME: 10/17/2022 11:59 AM     INDICATION: Difficulty swallowing. Choking episode.  COMPARISON: None.     TECHNIQUE: Routine swallow study with speech pathology using multiple barium thicknesses.     FINDINGS:   FLUOROSCOPIC TIME: 1.1 minutes  NUMBER OF IMAGES: 4 cine loops     Swallow study with Speech Pathology using multiple barium thicknesses.      Transient episodes of penetration with thin barium. No aspiration.     Prominent hypertrophic spurring at C5-C6 anteriorly with slight mass effect along the posterior esophagus.       EXAM: CTA ABDOMEN PELVIS WITH CONTRAST  LOCATION: [facility]  DATE/TIME: 10/11/2022 10:01 AM             IMPRESSION:     Normal lower thoracic and abdominal aorta and mesenteric vessels. No findings to suggest a source of acute lower gastrointestinal hemorrhage.     Assessment:   1. GERD  2. Dysphagia  32 year old female admitted with what appears to be aspiration pneumonia, hypoxia with history of dysphagia symptoms and findings of  reflux on recent esophagram. Symptoms may be due to GERD, esophageal motility disorder, less likely esophageal stricture. Would not recommend EGD at this time given pneumonia and hypoxia. Also, doubt this would change her medical management or clinical course.     Recommend outpatient EGD and follow-up in our esophageal clinic. She may eventually need esophageal motility study, but this is an outpatient only exam. We switched PPI from pantoprazole to omeprazole, would continue omeprazole 40mg twice daily as outpatient until she is seen in clinic.           Plan:   1. EGD as outpatient in near future.   2. Follow-up in esophageal clinic after EGD, our office will arrange  3. Omeprazole 40mg twice daily, 30 min before breakfast and dinner.     GI will sign off. Ok from our standpoint for discharge home.                                                                          Akiko Lin PA-C  Thank you for the opportunity to participate in the care of this patient.   Please feel free to call me with any questions or concerns.  Phone number [phone].

## 2022-10-19 NOTE — PLAN OF CARE
Problem: Plan of Care - These are the overarching goals to be used throughout the patient stay.    Goal: Plan of Care Review  Description: The Plan of Care Review/Shift note should be completed every shift.  The Outcome Evaluation is a brief statement about your assessment that the patient is improving, declining, or no change.  This information will be displayed automatically on your shift note.  Outcome: Progressing  Flowsheets (Taken 10/19/2022 0705)  Overall Patient Progress: no change   Goal Outcome Evaluation:           Overall Patient Progress: no changeOverall Patient Progress: no change     Slept well overnight. Woke up for morning lab draw. Had c/o left arm/shoulder pain. PRN Tylenol given. Dyspneic with exertion. Audible wheezing. PRN albuterol inhaler given. Using oxygen @ 1 Liter per nasal cannula; SpO2 94%.

## 2022-10-19 NOTE — PROGRESS NOTES
Physical Therapy Discharge Summary    Reason for therapy discharge:    All goals and outcomes met, no further needs identified.    Progress towards therapy goal(s). See goals on Care Plan in [facility] electronic health record for goal details.  Goals met    Therapy recommendation(s):    Continued therapy is recommended.  Rationale/Recommendations:  May benefit from HHPT to further assess ambulation and balance.

## 2022-10-19 NOTE — PLAN OF CARE
Problem: Plan of Care - These are the overarching goals to be used throughout the patient stay.    Goal: Optimal Comfort and Wellbeing  Intervention: Monitor Pain and Promote Comfort  Recent Flowsheet Documentation  Taken 10/18/2022 2030 by Kirstin Valencia RN  Pain Management Interventions:   medication (see MAR)   heat applied     Problem: Gas Exchange Impaired  Goal: Optimal Gas Exchange  Intervention: Optimize Oxygenation and Ventilation  Recent Flowsheet Documentation  Taken 10/18/2022 1727 by Kirstin Valencia RN  Head of Bed (HOB) Positioning: HOB at 20 degrees   Goal Outcome Evaluation:    Patient is pleasant and semi-compliant with wearing oxygen.  Refused neb treatments however was asking about getting a nebulizer to bring home.  Lung sounds wheezy.  Reapplied nasal canula at 1 L. Education given with minimal retention of information.  Multiple complaints of pain in various body locations including jaw, bilateral thighs, and right side.  Appears comfortable.  PRN tylenol given and patient reports all pain has resolved.

## 2022-10-19 NOTE — PROGRESS NOTES
Right arm PICC line removed without difficulty, catheter intact upon removal.  Left arm PIV removed, also without difficulty.    Patient discharged back to group home, transfer provided by [facility] staff with wheelchair. Writer did update staff at group home on new medications and appointment that needs to be made with primary care provider as ordered by discharging physician. New medications were sent with patient to group home.     Feli Rooney RN

## 2022-10-19 NOTE — DISCHARGE SUMMARY
Patient to return to [facility].  requested medical transport. [facility] at 1445. Mother updated.  called with transport time.

## 2022-10-19 NOTE — PROGRESS NOTES
Patient has been assessed for Home Oxygen needs.     Pulse oximetry (SpO2) and Oxygen flow readings:    SpO2 =  93% on room air at rest while awake.    SpO2 improved to  % on   liters/minute at rest.    SpO2 =  90% on room air during activity/with exercise.    *SpO2 improved to  % on   liters/minute during activity/with exercise.    Feli Rooney RN

## 2022-10-19 NOTE — PROGRESS NOTES
10/19/22 1300   Appointment Info   Signing Clinician's Name / Credentials (PT) Joselo Magallon, PT   Living Environment   People in Home facility resident   Current Living Arrangements group home   Home Accessibility stairs to enter home   Number of Stairs, Main Entrance 7   Stair Railings, Main Entrance railings on both sides of stairs   Transportation Anticipated agency   Living Environment Comments Pt lives in a group home, generally IND at baseline.   Self-Care   Usual Activity Tolerance fair   Current Activity Tolerance fair   Regular Exercise No   Equipment Currently Used at Home none   Fall history within last six months no   General Information   Onset of Illness/Injury or Date of Surgery 10/17/22   Referring Physician Dr. Stephany Padron.   Patient/Family Therapy Goals Statement (PT) To go home.   Pertinent History of Current Problem (include personal factors and/or comorbidities that impact the POC) From chart: 32-year-old female with seizure disorder, mild intellectual disability, asthma, CONNIE, mood disorder presents with aspiration pneumonia and acute hypoxic respiratory failure.   Weight-Bearing Status - LLE full weight-bearing   Weight-Bearing Status - RLE full weight-bearing   Heart Disease Risk Factors Lack of physical activity;Race   General Observations Female supine in bed, very drowsy.   Cognition   Affect/Mental Status (Cognition) WFL   Orientation Status (Cognition) oriented x 4   Follows Commands (Cognition) WFL   Cognitive Status Comments Pleasant, cooperative.   Pain Assessment   Patient Currently in Pain No   Integumentary/Edema   Integumentary/Edema Comments Not formally assessed.   Posture    Posture Not impaired   Range of Motion (ROM)   ROM Comment Grossly WFL.   Strength (Manual Muscle Testing)   Strength Comments Grossly WFL.   Bed Mobility   Comment, (Bed Mobility) IND.   Transfers   Comment, (Transfers) Sit-stand IND.   Gait/Stairs (Locomotion)   Comment, (Gait/Stairs) Ambulated x  200 ft with CGA, then SBA. Wide GLORIA but generally steady. Up and down 4 steps with B rails, CGA. Safe and steady.   Balance   Balance Comments Adequate for ambulation, may benefit from further assessment.   Sensory Examination   Sensory Perception patient reports no sensory changes   Coordination   Coordination no deficits were identified   Muscle Tone   Muscle Tone no deficits were identified   Clinical Impression   Criteria for Skilled Therapeutic Intervention Evaluation only   PT Diagnosis (PT) None.   Influenced by the following impairments None.   Functional limitations due to impairments None.   Clinical Presentation (PT Evaluation Complexity) Stable/Uncomplicated   Clinical Presentation Rationale Clinician judgment.   Clinical Decision Making (Complexity) low complexity   Risk & Benefits of therapy have been explained patient   PT Total Evaluation Time   PT Eval, Low Complexity Minutes (48710) 15   PT Discharge Planning   PT Plan PT: Discharge from therapy.   PT Discharge Recommendation (DC Rec) home with assist;home with home care physical therapy  (Return to group home.)   PT Rationale for DC Rec Pt mobilizing well without AD, near baseline. Pt would like further PT, if possible, to work on gait and balance.   PT Brief overview of current status PT eval completed. No barriers to discharge identified. Ambulated ~200 ft with no AD, CGA (OK for SBA). Completed stairs safely.   Total Session Time   Total Session Time (sum of timed and untimed services) 15

## 2022-10-19 NOTE — DISCHARGE SUMMARY
[facility]  Hospitalist Discharge Summary      Date of Admission:  10/17/2022  Date of Discharge:  10/19/2022  Discharging Provider: Jarvis Magana MD  Discharge Service: Hospitalist Service    Discharge Diagnoses   Aspiration pneumonitis  Acute respiratory failure with hypoxia, resolved  Acute asthma exacerbation, improving  GERD  Leukocytosis  Elevated D-dimer  Mild intellectual disability  Seizure disorder  Mood disorder  Weakness and deconditioning    Follow-ups Needed After Discharge   Follow-up Appointments     Follow-up and recommended labs and tests       Follow up with primary care provider, Radha Torres, within 7 days   for hospital follow- up.  The following labs/tests are recommended: CBC in   2 to 3 days.    Follow up with GI as scheduled for EGD             Unresulted Labs Ordered in the Past 30 Days of this Admission     Date and Time Order Name Status Description    10/17/2022  3:46 AM Blood Culture Line, venous Preliminary     10/17/2022  3:46 AM Blood Culture Line, venous Preliminary       These results will be followed up by PCP    Discharge Disposition   Back to group home  Condition at discharge: Stable    Hospital Course   32-year-old female with seizure disorder, mild intellectual disability, asthma, CONNIE, mood disorder presents with aspiration pneumonia and acute hypoxic respiratory failure, please refer to H&P for details    Aspiration pneumonia, acute hypoxic and hypercapnic respiratory failure  -Patient presented with worsening cough and shortness of breath after witnessed episode of aspiration.  Chest x-ray 10/17/2022 with extensive patchy consolidation throughout the right lung particularly in the perihilar region consistent with pneumonia.  Patient otherwise without leukocytosis, hemodynamically stable without fevers, mildly elevated procalcitonin, no signs of sepsis.  COVID and influenza negative.  CTA chest does suggest some esophageal motility  issues.  Patient, though, mild acidosis on blood gas second to hypercapnia although oxygen needs have been decreasing and currently only on 1/2 L nasal cannula  -Stop zosyn IV, start augmentin po BID  -Attempt sputum culture  - Weaned off oxygen  - Incentive spirometry plus flutter valve  - SLP following, feel that aspiration likely cause by refluxed food from lying down after eating.  - Home O2 evaluation was done and patient was noncalcified from oxygen     Acute asthma exacerbation  - Patient presenting with wheezing, hypoxia in the setting of aspiration and new asthma.  Hypoxia and wheezing have both improved  - DuoNeb 4 times daily  - Continue home fluticasone twice daily  - Prednisone 40 mg p.o. daily x5  - Montelukast 10 mg p.o. daily  - Pulmonary hygiene as above     GERD, esophageal reflux  -SLP with esophagram on 10/17 with spontaneous reflux to the level of thoracic inlet, patulous esophagus.    - Swallow therapy with concern was for aspiration of refluxed food  - Pantoprazole stopped by GI, will use omperazole po BID in it's place  - Outpatient EGD and possible manometry   - GI consulted, appreciate recommendations     Leukocytosis  - Patient with mild leukocytosis, unlikely secondary to infection and likely secondary to prednisone for asthma exacerbation.  -Continue to follow-up as outpatient     Elevated D-dimer  -Elevated D-dimer 0.92 in the setting of cough, hypoxia.  CTA chest negative for pulmonary embolism.  Likely secondary to aspiration pneumonitis/pneumonia     Elevated proBNP  - Patient with elevated proBNP in the absence of chest pain, negative high-sensitivity troponin x2.    - Patient does have elevated D-dimer so ruling out PE.    - Does not appear to be fluid overloaded no signs of pulmonary edema on chest x-ray.    - TTE 10/17/2022 with ejection fraction of 60 to 65%  abnormalities. -  Likely reactive in the setting of aspiration pneumonia and hypoxia.     Ventricular tachycardia  -  Patient with 24 beats of V. tach on 10/17 while in the emergency department.    - TTE normal, troponin negative x3, magnesium actually elevated and other electrolytes normal.   -  Seem to be associated with coughing fit  - Follow-up with cardiology as outpatient     Mild intellectual disability  -Patient currently group home resident, prior traumatic brain injury.     Seizure disorder  -Divalproex 2000 mg p.o. q. bedtime     Mood disorder  -Aripiprazole 30 mg p.o. daily  -Sertraline 200 mg p.o. q. bedtime     CONNIE  - Noncompliant with CPAP     Urinary incontinence  -Oxybutynin 15 mg p.o. daily    Weakness and deconditioning  - PT evaluation  - Plan for home care on discharge    Discussed with patient, nursing staff and discharge planner    Consultations This Hospital Stay   SPEECH LANGUAGE PATH ADULT IP CONSULT  VASCULAR ACCESS ADULT IP CONSULT  CARE MANAGEMENT / SOCIAL WORK IP CONSULT  GASTROENTEROLOGY IP CONSULT  PHYSICAL THERAPY ADULT IP CONSULT    Code Status   Full Code    Time Spent on this Encounter   IJarvis MD, personally saw the patient today and spent greater than 30 minutes discharging this patient.       Jarvis Magana MD  [place] [id number]  Phone: [phone]  Fax: [phone]  ______________________________________________________________________    Physical Exam   Vital Signs: Temp: 98.7  F (37.1  C) Temp src: Oral BP: 116/55 Pulse: 60   Resp: 16 SpO2: 93 % O2 Device: None (Room air) Oxygen Delivery: 1 LPM    General appearance - alert, well appearing, and in no distress and oriented to person, place, and time  Respiratory -coarse bilateral breath sounds but no auscultated wheezes, crackles or rhonchi.  Nasal cannula in place.  Cardiac - normal rate, regular rhythm, normal S1, S2, no murmurs, rubs, clicks or gallops, no JVD  Abdomen - soft, nontender, nondistended, no masses or organomegaly no rebound tenderness noted bowel  sounds normal, no bladder distension noted  Neurological - alert, oriented, normal speech, no focal findings or movement disorder noted  Musculoskeletal - no joint tenderness, deformity or swelling, full range of motion without pain  Extremities - peripheral pulses normal, no pedal edema, no clubbing or cyanosis  Skin - normal coloration and turgor, no rashes, no suspicious skin lesions noted       Primary Care Physician   Radha Torres    Discharge Orders      Reason for your hospital stay    Aspiration pneumonitis with asthma exacerbation     Follow-up and recommended labs and tests     Follow up with primary care provider, Radha Torres, within 7 days for hospital follow- up.  The following labs/tests are recommended: CBC in 2 to 3 days.    Follow up with GI as scheduled for EGD     Activity    Your activity upon discharge: activity as tolerated     Discharge Instructions    Patient can return to work on 10/21/2022     Diet    Follow this diet upon discharge: Orders Placed This Encounter      Regular Diet Adult       Significant Results and Procedures   Most Recent 3 CBC's:Recent Labs   Lab Test 10/18/22  0638 10/17/22  0238 10/11/22  0806   WBC 12.8* 10.7 6.6   HGB 10.9* 12.4 12.7   MCV 85 87 86    215 192     Most Recent 3 BMP's:Recent Labs   Lab Test 10/19/22  0539 10/18/22  0638 10/17/22  1701 10/17/22  0238   NA  --  140 138 139   POTASSIUM 4.3 3.9 4.2 4.7   CHLORIDE  --  101 100 102   CO2  --  29 27 27   BUN  --  9.9 8.8 8.6   CR  --  1.00* 0.91 1.09*   ANIONGAP  --  10 11 10   JULIÁN  --  8.3* 8.8 8.4*   GLC  --  111* 143* 113*   ,   Results for orders placed or performed during the hospital encounter of 10/17/22   XR Chest 2 Views    Narrative    EXAM: XR CHEST 2 VIEWS  LOCATION: [facility]  DATE/TIME: 10/17/2022 3:30 AM    INDICATION: cough  COMPARISON: 12/20/2021.      Impression    IMPRESSION: Extensive patchy consolidation throughout the right lung particularly of  the perihilar region consistent with pneumonia. There may be mild involvement of the left perihilar region. No pleural effusion or pneumothorax. Top normal heart size.   CT Chest Pulmonary Embolism w Contrast    Narrative    EXAM: CT CHEST PULMONARY EMBOLISM W CONTRAST  LOCATION: [facility]  DATE/TIME: 10/17/2022 8:24 AM    INDICATION: cough, shortness of breath; clinical history includes asthma  COMPARISON: CT of the chest with contrast 8/26/2020  TECHNIQUE: CT chest pulmonary angiogram during arterial phase injection of IV contrast. Multiplanar reformats and MIP reconstructions were performed. Dose reduction techniques were used.   CONTRAST: isovue 370 100ml    FINDINGS:    Image quality degraded by gross body and respiratory motion-related blurring artifacts.    ANGIOGRAM CHEST: The main pulmonary artery is normal caliber. There are no pulmonary artery filling defects in the main, right/left, lobar, segmental or proximal subsegmental pulmonary arteries. The mid and peripheral subsegmental pulmonary arteries are   not well evaluated due to motion artifacts. Thoracic aorta is negative for dissection.    LUNGS AND PLEURA: Multifocal airspace consolidation is present in the right lung greatest in the right upper lobe. No left lung consolidation. Images were acquired at the expiratory phase of the respiratory cycle indicated by anterior bowing of the   membranous airways. No visible endoluminal airway debris. No pleural effusion.    MEDIASTINUM: There is an abnormal morphology of the left ventricle, stable from 2020 including a waist-like narrowing of the mid left ventricle producing a somewhat bulbous morphology of the left ventricular apex. Right heart chambers are normal. No   enlarged mediastinal or hilar lymph nodes. Patulous esophagus suggesting dysmotility. There is a unchanged small hiatal hernia.    CORONARY ARTERY CALCIFICATION: None.    UPPER ABDOMEN: No actionable findings in the  imaged upper abdomen.    MUSCULOSKELETAL: Normal bone mineralization. No fractures or bone lesions.      Impression    IMPRESSION:    1.  No acute pulmonary embolism to the proximal subsegmental level. Peripheral pulmonary arteries obscured by motion artifacts.  2.  Patchy right lung consolidation consistent with pneumonia, including aspiration pneumonia.  3.  Patulous esophagus suggesting dysmotility and small hiatal hernia.  4.  Abnormal morphology of the left ventricle which is stable from prior studies. 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   XR Video Swallow with SLP or OT    Narrative    EXAM: XR VIDEO SWALLOW WITH SLP OR OT  LOCATION: [facility]  DATE/TIME: 10/17/2022 11:59 AM    INDICATION: Difficulty swallowing. Choking episode.  COMPARISON: None.    TECHNIQUE: Routine swallow study with speech pathology using multiple barium thicknesses.    FINDINGS:   FLUOROSCOPIC TIME: 1.1 minutes  NUMBER OF IMAGES: 4 cine loops    Swallow study with Speech Pathology using multiple barium thicknesses.     Transient episodes of penetration with thin barium. No aspiration.    Prominent hypertrophic spurring at C5-C6 anteriorly with slight mass effect along the posterior esophagus.          XR Esophagram    Narrative    EXAM: XR ESOPHAGRAM  LOCATION: [facility]  DATE/TIME: 10/17/2022 12:00 PM    INDICATION: feeling food gets stuck and won't go down.  COMPARISON: None.  TECHNIQUE: Routine.    FINDINGS:  FLUOROSCOPIC TIME: 1.2 minutes  NUMBER OF IMAGES: 2 nearly    ESOPHAGUS: Modified, limited esophagram was due to patient's status.    A double contrast esophagram was performed. Esophagus is quite patulous. No evidence of achalasia.      The table was converted from upright to horizontal with the patient prone, she started coughing and wheezing. Fluoroscopy at this time demonstrated reflux of barium to the upper esophagus.     Study  terminated at this time.      Impression    IMPRESSION:  1.  Very limited esophagram as noted above.   2.  There is spontaneous reflux to the level of the thoracic inlet.  3.  Esophagus is quite patulous.  4.  No obvious, large hernia.   Echocardiogram Complete     Value    LVEF  60-65%    Narrative    [id number]  [id number]  [id number]  [id number]^ROSALINDA^PHYLLIS^[place] [address]     Name: ERIS BRANCH  MRN: [id number]  : 1990  Study Date: 10/17/2022 03:07 PM  Age: 32 yrs  Gender: Female  Patient [facility]: [facility]  Reason For Study: Heart Failure  Ordering Physician: PHYLLIS TELLEZ  Performed By: CM     BSA: 1.8 m2  Height: 63 in  Weight: 180 lb  HR: 62  ______________________________________________________________________________  Procedure  Complete Echo Adult. Definity (NDC #67968-614) given intravenously.  ______________________________________________________________________________  Interpretation Summary     Left ventricular size, wall motion and function are normal. The ejection  fraction is 60-65%.  Normal right ventricle size and systolic function.  No obvious valvular disease.     No previous study for comparison.     ______________________________________________________________________________  I      WMSI = 1.00     % Normal = 100     X - Cannot   0 -                      (2) - Mildly 2 -          Segments  Size  Interpret    Hyperkinetic 1 - Normal  Hypokinetic  Hypokinetic  1-2     small                                                     7 -          3-5      moderate  3 - Akinetic 4 -          5 -         6 - Akinetic Dyskinetic   6-14    large               Dyskinetic   Aneurysmal  w/scar       w/scar       15-16   diffuse     Left Ventricle  Left ventricular size, wall motion and function are normal. The ejection  fraction is 60-65%. There is normal left ventricular wall thickness. Left  ventricular diastolic function is indeterminate. Normal  left ventricular wall  motion.     Right Ventricle  Normal right ventricle size and systolic function.     Atria  Normal left atrial size. Right atrial size is normal. There is no atrial shunt  seen.     Mitral Valve  Mitral valve leaflets appear normal. There is no evidence of mitral stenosis  or clinically significant mitral regurgitation.     Tricuspid Valve  Tricuspid valve leaflets appear normal. There is no evidence of tricuspid  stenosis or clinically significant tricuspid regurgitation.     Aortic Valve  Aortic valve leaflets appear normal. There is no evidence of aortic stenosis  or clinically significant aortic regurgitation.     Pulmonic Valve  The pulmonic valve is normal in structure and function.     Vessels  The aorta root is normal. IVC diameter <2.1 cm collapsing >50% with sniff  suggests a normal RA pressure of 3 mmHg.     Pericardium  There is no pericardial effusion.     Rhythm  Sinus rhythm was noted.     ______________________________________________________________________________  MMode/2D Measurements & Calculations  IVSd: 0.79 cm  LVIDd: 3.7 cm  LVIDs: 2.4 cm  LVPWd: 0.92 cm     FS: 35.5 %  LV mass(C)d: 90.9 grams  LV mass(C)dI: 49.1 grams/m2  Ao root diam: 2.8 cm  LA dimension: 2.7 cm  asc Aorta Diam: 2.9 cm  LA/Ao: 0.96  LVOT diam: 1.9 cm  LVOT area: 2.8 cm2  LA Volume Indexed (AL/bp): 20.1 ml/m2  RWT: 0.50     Time Measurements  MM HR: 64.0 BPM     Doppler Measurements & Calculations  MV E max yonny: 97.9 cm/sec  MV A max yonny: 54.7 cm/sec  MV E/A: 1.8  MV max P.0 mmHg  MV mean P.3 mmHg  MV V2 VTI: 30.3 cm  MVA(VTI): 2.6 cm2  MV dec slope: 471.3 cm/sec2  MV dec time: 0.21 sec  Ao V2 max: 157.5 cm/sec  Ao max PG: 10.0 mmHg  Ao V2 mean: 97.1 cm/sec  Ao mean P.5 mmHg  Ao V2 VTI: 33.2 cm  SOFI(I,D): 2.3 cm2  SOFI(V,D): 2.6 cm2  LV V1 max P.1 mmHg  LV V1 max: 142.1 cm/sec  LV V1 VTI: 27.4 cm  SV(LVOT): 77.7 ml  SI(LVOT): 42.0 ml/m2  AV Yonny Ratio (DI): 0.90  SOFI Index (cm2/m2):  1.3  E/E' av.0  Lateral E/e': 6.4  [facility] E/e': 11.5     ______________________________________________________________________________  Report approved by: Lisseth Graham 10/17/2022 03:44 PM               Discharge Medications   Current Discharge Medication List      START taking these medications    Details   amoxicillin-clavulanate (AUGMENTIN) 875-125 MG tablet Take 1 tablet by mouth every 12 hours for 10 days  Qty: 20 tablet, Refills: 0    Associated Diagnoses: Aspiration pneumonitis (H)      omeprazole (PRILOSEC) 40 MG DR capsule Take 1 capsule (40 mg) by mouth 2 times daily for 90 days  Qty: 180 capsule, Refills: 0    Associated Diagnoses: Gastroesophageal reflux disease without esophagitis      predniSONE (DELTASONE) 20 MG tablet Take 2 tablets (40 mg) by mouth daily for 2 days  Qty: 4 tablet, Refills: 0    Associated Diagnoses: Asthma with acute exacerbation, unspecified asthma severity, unspecified whether persistent         CONTINUE these medications which have NOT CHANGED    Details   albuterol (PROAIR HFA) 108 (90 Base) MCG/ACT inhaler Inhale 2 puffs into the lungs daily as needed for wheezing  Qty: 1 Inhaler, Refills: 0    Comments: Pharmacy may dispense brand covered by insurance (Proair, or proventil or ventolin or generic albuterol inhaler)  Associated Diagnoses: Uncomplicated asthma, unspecified asthma severity, unspecified whether persistent      ARIPiprazole (ABILIFY) 30 MG tablet Take 1 tablet (30 mg) by mouth daily  Qty: 30 tablet, Refills: 0    Associated Diagnoses: Psychosis, affective (H)      diphenhydrAMINE (BENADRYL) 25 MG tablet Take 25 mg by mouth daily as needed for itching or allergies      divalproex sodium delayed-release (DEPAKOTE) 500 MG DR tablet Take 4 tablets (2,000 mg) by mouth At Bedtime  Qty: 120 tablet, Refills: 0    Associated Diagnoses: Seizure disorder (H)      docusate sodium (COLACE) 100 MG capsule Take 1 capsule (100 mg) by mouth daily  Qty: 30 capsule, Refills: 0     Associated Diagnoses: Drug-induced constipation      fish oil-omega-3 fatty acids 1000 MG capsule Take 1 capsule (1 g) by mouth 3 times daily  Qty: 30 capsule, Refills: 0    Associated Diagnoses: Vitamin D deficiency      fluticasone (ARNUITY ELLIPTA) 100 MCG/ACT inhaler Inhale 1 puff into the lungs daily  Qty: 1 each, Refills: 0    Associated Diagnoses: Uncomplicated asthma, unspecified asthma severity, unspecified whether persistent      gabapentin (NEURONTIN) 300 MG capsule Take 300 mg by mouth daily as needed (tremor)      ketoconazole (NIZORAL) 2 % external cream Apply 1 Application topically 2 times daily as needed Apply to skin fold      medroxyPROGESTERone (DEPO-PROVERA) 150 MG/ML IM injection Inject 1 mL (150 mg) into the muscle every 3 months Last injection was on 12/10/20 per group home  Qty: 1 mL, Refills: 0    Associated Diagnoses: Encounter for contraceptive management, unspecified type      montelukast (SINGULAIR) 10 MG tablet Take 1 tablet (10 mg) by mouth At Bedtime  Qty: 30 tablet, Refills: 0    Associated Diagnoses: Uncomplicated asthma, unspecified asthma severity, unspecified whether persistent      OLANZapine (ZYPREXA) 5 MG tablet Take 5 mg by mouth daily as needed (agitation anxiety)      oxybutynin ER (DITROPAN XL) 15 MG 24 hr tablet Take 1 tablet (15 mg) by mouth daily  Qty: 30 tablet, Refills: 0    Associated Diagnoses: Overactive bladder      polyethylene glycol (MIRALAX) 17 g packet Take 17 g by mouth daily as needed for constipation  Qty: 30 packet, Refills: 0    Associated Diagnoses: Drug-induced constipation      SENNA-docusate sodium (SENNA S) 8.6-50 MG tablet Take 1 tablet by mouth At Bedtime  Qty: 30 tablet, Refills: 0    Associated Diagnoses: Drug-induced constipation      sennosides (SENOKOT) 8.6 MG tablet Take 1 tablet by mouth 2 times daily as needed for constipation  Qty: 30 tablet, Refills: 0    Associated Diagnoses: Drug-induced constipation      sertraline (ZOLOFT) 100 MG  tablet Take 2 tablets (200 mg) by mouth At Bedtime  Qty: 60 tablet, Refills: 0    Associated Diagnoses: Depression, major, recurrent, severe with psychosis (H)      Vitamin D3 (CHOLECALCIFEROL) 25 mcg (1000 units) tablet Take 1 tablet (25 mcg) by mouth daily  Qty: 30 tablet, Refills: 0    Associated Diagnoses: Vitamin D deficiency         STOP taking these medications       pantoprazole (PROTONIX) 40 MG EC tablet Comments:   Reason for Stopping:             Allergies   Allergies   Allergen Reactions     Ibuprofen Other (See Comments)     Until cleared by primary MD d/t kidney function     Tomato Unknown

## 2022-10-19 NOTE — PROGRESS NOTES
Respiratory Care    Pt did morning treatments, but only willing to do a few breaths before declining remainder of neb. Same result with flutter valve. Tried to explain to pt the benefits, but she continued to refuse treatment and stated she just wants to go home. She is on 0.5 LPM SpO2 mid 90s, BS diminished/clear. She has a NPC. Does not appear to be in any respiratory distress resting in bed. Will follow as needed.      Rj Lopze, RT

## 2022-10-19 NOTE — PLAN OF CARE
"Speech Language Therapy Discharge Summary    Reason for therapy discharge:    All goals and outcomes met, no further needs identified.    Progress towards therapy goal(s). See goals on Care Plan in [facility] electronic health record for goal details.  Goals met    Therapy recommendation(s):    No further therapy is recommended.    Goal Outcome Evaluation:  Pt now on regular diet with thin liquids with reflux precautions. Pt stated that things are much better now and no feeling of food coming back up wtih use of strategies. Pt encouraged to not lie down after eating and she agreed as it was scary when she \"choked\" on chips which brought her to the hospital. Also reiterated eating smaller meals and to take her time. She agreed. No further ST warranted.                      "

## 2022-11-13 NOTE — ED PROVIDER NOTES
"Emergency Department Staff Physician Note     I had a face to face encounter with this patient seen by the Advanced Practice Provider (THANIA).  I have seen, examined, and discussed the patient with the THANIA and agree with their assessment and plan of management.    Relevant HPI:     Michelle Person is a 32 year old female with a pertinent history of intellectual disability, MARY, acute renal failure, acute respiratory failure, cluster B personality disorder, community-acquired pneumonia, obesity, Aspiration pneumonitis, sleep apnea, prediabetes, paraesophageal hernia, gastritis, gastric reflux who presents to this ED for evaluation of diffuse lower abdominal pain.    Patient states that over the last couple of days she has had increasing, sharp pain in the LUQ. Does note that she was recently admitted to the hospital, however is unable to tell me why she was admitted. Has not been taking any medications at home other than Tylenol. She notes some intermittent constipation, with intermittent diarrhea. Denies any difficulty breathing, chest pain, shortness of breath. Reports that \"they found a hernia last time I was in the hospital, but I do not know whenever to take it out\".     ED Course:  12:17 PM I received the patient report from the THANIA, HEMALATHA Hernandez. I agree with their assessment and plan of management, and I will see the patient.  12:25 PM I met with the patient to introduce myself, gather additional history, perform my initial exam, and discuss the plan.     Brief Physical Exam:  VITAL SIGNS: /86   Pulse 88   Temp 98.7  F (37.1  C) (Temporal)   Resp 18   Ht 1.575 m (5' 2\")   Wt 81.6 kg (180 lb)   SpO2 98%   BMI 32.92 kg/m      General Appearance: Well-appearing, well-nourished, no acute distress   Head:  Normocephalic, without obvious abnormality, atraumatic  Eyes:  PERRL, conjunctiva/corneas clear, EOM's intact,  ENT:  Lips, mucosa, and tongue normal, membranes are moist without " pallor  Neck:  Normal ROM, symmetrical, trachea midline    Cardio:  Regular rate and rhythm, no murmur, rub or gallop, 2+ pulses symmetric in all extremities  Pulm:  Clear to auscultation bilaterally, respirations unlabored,   Abdomen:  Soft, no rebound or guarding. Mild diffuse tenderness  Musculoskeletal: Full ROM, no edema, no cyanosis, good ROM of major joints  Integument:  Warm, Dry, No erythema, No rash.    Neurologic:  Alert & oriented.  No focal deficits appreciated.  Ambulatory.  Psychiatric:  Affect normal, Judgment normal, Mood normal.         Impression / ED Plan:  I had a face to face encounter with this patient seen by the Advanced Practice Provider (THANIA).  I have seen, examined, and discussed the patient with the THANIA and agree with their assessment and plan of management. I personally saw the patient and performed a substantive portion of the visit including all aspects of the medical decision making.    Michelle Person is a 32 year old female presents to the ED for evaluation of abdominal pain.  Patient did have diffuse pain on my exam.  Patient did not complain of any urinary type symptoms, however UA is concerning for potential UTI.  Lab testing as patient was essentially unremarkable, CT imaging did not show any evidence of acute process to include appendicitis, cholecystitis, choledocholithiasis, bowel obstruction, mesenteric ischemia, volvulus, pancreatitis, diverticulitis, pelvic mass, or other acute process agrees explain her symptoms.  This time I do not find emergent cause of the patient's abdominal pain, but we will treat the UTI.  Patient was agreeable with the plan.  Follow-up as an outpatient peer return precautions discussed.    1. Gastritis    2. LUQ abdominal pain    3. Urinary tract infection        I, Zeyad Vargas, am serving as a scribe to document services personally performed by Dr. Sadler based on my observation and the provider's statements to me. I, Luciano Sadler, DO attest  that Zeyad Vargas is acting in a scribe capacity, has observed my performance of the services and has documented them in accordance with my direction.      Luciano Sadler D.O.  Emergency Medicine  [facility] EMERGENCY DEPARTMENT  [address] [place] [id number] [phone]  Dept: [phone]     Luciano Sadler,   11/14/22 0614

## 2022-11-13 NOTE — ED TRIAGE NOTES
"Pt presents with abd pain that has increased in severity and hard stools. Pt is a resident at [facility] ([facility]) and staff is with. Pt was recently in the hospital for \"ulcers\" and \"something in her stomach that needs to come out.\"      Triage Assessment     Row Name 11/13/22 1048       Triage Assessment (Adult)    Airway WDL WDL       Respiratory WDL    Respiratory WDL cough;WDL       Cardiac WDL    Cardiac WDL WDL       Peripheral/Neurovascular WDL    Peripheral Neurovascular WDL WDL       Cognitive/Neuro/Behavioral WDL    Cognitive/Neuro/Behavioral WDL WDL              "

## 2022-11-13 NOTE — DISCHARGE INSTRUCTIONS
You are seen here in the emergency department for evaluation of left upper quadrant abdominal pain.  Your laboratory studies here were stable.  No indication for any infection, or obstruction in your bowels.  I suspect your symptoms may be secondary to some constipation, as well as some possible urinary tract infection.  Your urine here does somewhat look like infection, we will treat you with some abx treat your infection.    Please help with your primary care doctor as needed.  Return to the emergency department if you develop any new or worsening symptoms.    For pain or fever you may use:  -Tylenol 650 mg every 6 hours.  Max 4000 mg in 24 hours  Do not use thismedication with alcohol as it can cause liver problems.  -Ibuprofen 600 mg every 6 hours.  Max 3500 mg in 24 hours  Do not take this medication if you have a history of a GI bleed or have kidney problems.  You may use both of these medications at the same time or you can alternate them every 3 hours.  For example, Tylenol at 6 AM, ibuprofen at 9 AM, Tylenol at noon, etc.

## 2022-11-13 NOTE — ED PROVIDER NOTES
"EMERGENCY DEPARTMENT ENCOUNTER      NAME: Michelle Person  AGE: 32 year old female  YOB: 1990  MRN: [id number]  EVALUATION DATE & TIME: No admission date for patient encounter.    PCP: Radha Torres    ED PROVIDER: Yuval Rosario PA-C      Chief Complaint   Patient presents with     Abdominal Pain         FINAL IMPRESSION:  1. Gastritis    2. LUQ abdominal pain    3. Urinary tract infection          ED COURSE & MEDICAL DECISION MAKING:    Pertinent Labs & Imaging studies reviewed. (See chart for details)  11:54 AM I met the patient and performed my initial interview and exam. Staffed with Dr. Sadler  3:21 PM Patient seen and reevaluated, updated on imaging results     32 year old female presents to the Emergency Department for evaluation of LUQ abdominal pain.     ED Course as of 11/13/22 1522   Sun Nov 13, 2022   1255 Patient is a 32-year-old female, complex past medical history: Recent admission from 10/17-10/19/2022 for aspiration pneumonia, gastritis, reflux, esophageal ulcer, who presents emergency department for evaluation of 1 to 2 days of left upper quadrant abdominal pain.  Patient notes that she has not take any medications at home for the pain.  She notes that she has had off-and-on diarrhea, hard stool for the last couple of days.  No nausea or vomiting.  No burning with urination.  No blood in her urine.  No blood in her stool.  No fevers.  Unclear why the patient has been taking her medications.  She reports that \"she has something in her stomach that needs to be removed\".  On examination she has left upper quadrant tenderness, mild CVA tenderness.  Tenderness is isolated to the left upper quadrant of the abdomen, remainder the quadrants here are unremarkable, soft and nontender.  Differential at this point includes gastritis, cholecystitis, choledocholithiasis, pancreatitis, appendicitis, diverticulitis, bowel obstruction, constipation, acute cystitis, nephrolithiasis.  Plan " will be for basic laboratory studies including UA, hCG, lipase, Paddock function, BMP, CBC, as well as a CT of the abdomen pelvis to visualize the intra-abdominal organs.  We will give her a GI cocktail here for symptom management.  Patient is agreeable with this plan.   1353 Urinalysis shows some leukocyte esterase, few bacteria.  Creatinine and calcium are consistent with patient's previous laboratories.  Hemoglobin is stable with previous.  Lipase is without abnormality.   1517 Patient seen and reevaluated, pain improved, we will treat her with some antibiotics.  Given the urinalysis.  Plan for discharge here.  Follow-up with primary care.  Return to the emergency department If she develops any new or worsening symptoms.  Patient is agreeable with this plan.       Medical Decision Making    Supplemental history from: N/A    External Record(s) Reviewed: Inpatient Record    Differential Diagnosis: See MDM charting for differential considered.     I performed an independent interpretation of the: N/A    Discussed with radiology regarding test interpretation: N/A    Discussion of management with another provider: See ED Course    The following testing was considered but ultimately not selected: None    I considered prescription management with: Symptomatic Management    The patient's care impacted: None    Consideration of Admission/Observation: N/A - Patient admitted    Care significantly affected by Social Determinants of Health including: N/A     At the conclusion of the encounter I discussed the results of all of the tests and the disposition. The questions were answered. The patient or family acknowledged understanding and was agreeable with the care plan.     0 minutes of critical care time     MEDICATIONS GIVEN IN THE EMERGENCY:  Medications   lidocaine (viscous) (XYLOCAINE) 2 % 15 mL, alum & mag hydroxide-simethicone (MAALOX) 15 mL GI Cocktail (30 mLs Oral Given 11/13/22 9415)   iopamidol (ISOVUE-370)  "solution 100 mL (100 mLs Intravenous Given 11/13/22 8327)       NEW PRESCRIPTIONS STARTED AT TODAY'S ER VISIT  New Prescriptions    CEFDINIR (OMNICEF) 300 MG CAPSULE    Take 1 capsule (300 mg) by mouth 2 times daily for 10 days     =================================================================    HPI    Patient information was obtained from: Patient     Use of : N/A       Michelle Person is a 32 year old female with a pertinent history of intellectual disability, MARY, acute renal failure, acute respiratory failure, cluster B personality disorder, community-acquired pneumonia, obesity, Aspiration pneumonitis, sleep apnea, prediabetes, paraesophageal hernia, gastritis, gastric reflux who presents to this ED for evaluation of diffuse lower abdominal pain. Patient states that over the last couple of days she has had increasing pain in the LUQ pain. Patient notes that the pain is sharp in nature.  Denies any nausea or vomiting.  Does note that she was recently admitted to the hospital, however is unable to tell me why she was admitted.  Has not been taking any medications at home other than Tylenol.  No chest pain or shortness of breath.  No fevers.  No dysuria or hematuria.  She notes some intermittent constipation, with intermittent diarrhea.  Has still been having bowel movements.  No blood in her stool.  No blood in her urine.  Denies any difficulty breathing, chest pain, shortness of breath.  Denies any numbness or tingling.  Denies any vaginal bleeding or discharge.  Primarily complaining of pain in the left upper quadrant, notes that it is sharp in nature.  Reports that \"they found a hernia last time I was in the hospital, but I do not know whenever to take it out\".     From the discharge summary from hospitalization from 10/17-10/19/2022  Aspiration pneumonia, acute hypoxic and hypercapnic respiratory failure  -Patient presented with worsening cough and shortness of breath after witnessed episode of " aspiration.  Chest x-ray 10/17/2022 with extensive patchy consolidation throughout the right lung particularly in the perihilar region consistent with pneumonia.  Patient otherwise without leukocytosis, hemodynamically stable without fevers, mildly elevated procalcitonin, no signs of sepsis.  COVID and influenza negative.  CTA chest does suggest some esophageal motility issues.  Patient, though, mild acidosis on blood gas second to hypercapnia although oxygen needs have been decreasing and currently only on 1/2 L nasal cannula  -Stop zosyn IV, start augmentin po BID  -Attempt sputum culture  - Weaned off oxygen  - Incentive spirometry plus flutter valve  - SLP following, feel that aspiration likely cause by refluxed food from lying down after eating.  - Home O2 evaluation was done and patient was noncalcified from oxygen  Acute asthma exacerbation  - Patient presenting with wheezing, hypoxia in the setting of aspiration and new asthma.  Hypoxia and wheezing have both improved  - DuoNeb 4 times daily  - Continue home fluticasone twice daily  - Prednisone 40 mg p.o. daily x5  - Montelukast 10 mg p.o. daily  - Pulmonary hygiene as above  GERD, esophageal reflux  -SLP with esophagram on 10/17 with spontaneous reflux to the level of thoracic inlet, patulous esophagus.    - Swallow therapy with concern was for aspiration of refluxed food  - Pantoprazole stopped by GI, will use omperazole po BID in it's place  - Outpatient EGD and possible manometry   - GI consulted, appreciate recommendations    REVIEW OF SYSTEMS   Review of Systems   Constitutional: Negative for chills, fatigue and fever.   Respiratory: Negative for chest tightness and shortness of breath.    Cardiovascular: Negative for chest pain.   Gastrointestinal: Positive for abdominal pain, constipation and diarrhea. Negative for anal bleeding, blood in stool, nausea and vomiting.   Genitourinary: Negative for dysuria and hematuria.   Neurological: Negative for  dizziness, weakness, numbness and headaches.   All other systems reviewed and are negative.       PAST MEDICAL HISTORY:  Past Medical History:   Diagnosis Date     Asthma      Depression      Morbid obesity (H)      MR (mental retardation)      Psychosis (H)      Seizures (H)      Sleep apnea     been refusing to wear cpap machine at group home     Suicidal ideation      Suicide attempt (H)        PAST SURGICAL HISTORY:  Past Surgical History:   Procedure Laterality Date     CARDIAC SURGERY      pt reports as an infant, unable to obtain records     HC PELVIC EXAMINATION W ANESTH N/A 12/13/2018    Procedure: EXAM UNDER ANESTHESIA,  DRAINAGE OF ABCESS;  Surgeon: Caryn Ortiz MD;  Location: [facility];  Service: Gynecology     PICC DOUBLE LUMEN PLACEMENT  10/17/2022          PICC MIDLINE INSERTION  8/23/2020          WISDOM TOOTH EXTRACTION       CURRENT MEDICATIONS:    cefdinir (OMNICEF) 300 MG capsule  albuterol (PROAIR HFA) 108 (90 Base) MCG/ACT inhaler  ARIPiprazole (ABILIFY) 30 MG tablet  diphenhydrAMINE (BENADRYL) 25 MG tablet  divalproex sodium delayed-release (DEPAKOTE) 500 MG DR tablet  docusate sodium (COLACE) 100 MG capsule  fish oil-omega-3 fatty acids 1000 MG capsule  fluticasone (ARNUITY ELLIPTA) 100 MCG/ACT inhaler  gabapentin (NEURONTIN) 300 MG capsule  ketoconazole (NIZORAL) 2 % external cream  medroxyPROGESTERone (DEPO-PROVERA) 150 MG/ML IM injection  montelukast (SINGULAIR) 10 MG tablet  OLANZapine (ZYPREXA) 5 MG tablet  omeprazole (PRILOSEC) 40 MG DR capsule  oxybutynin ER (DITROPAN XL) 15 MG 24 hr tablet  polyethylene glycol (MIRALAX) 17 g packet  SENNA-docusate sodium (SENNA S) 8.6-50 MG tablet  sennosides (SENOKOT) 8.6 MG tablet  sertraline (ZOLOFT) 100 MG tablet  Vitamin D3 (CHOLECALCIFEROL) 25 mcg (1000 units) tablet         ALLERGIES:  Allergies   Allergen Reactions     Ibuprofen Other (See Comments)     Until cleared by primary MD d/t kidney function     Tomato Unknown  "      FAMILY HISTORY:  Family History   Problem Relation Age of Onset     Hypertension Mother        SOCIAL HISTORY:   Social History     Socioeconomic History     Marital status: Single   Tobacco Use     Smoking status: Former     Smokeless tobacco: Never   Substance and Sexual Activity     Alcohol use: No     Drug use: No   Social History Narrative    The patient lives in a foster home.        VITALS:  BP 95/52   Pulse 91   Temp 98.7  F (37.1  C) (Temporal)   Resp 18   Ht 1.575 m (5' 2\")   Wt 81.6 kg (180 lb)   SpO2 97%   BMI 32.92 kg/m      PHYSICAL EXAM    Physical Exam  Vitals and nursing note reviewed.   Constitutional:       General: She is not in acute distress.     Appearance: Normal appearance. She is normal weight. She is not toxic-appearing or diaphoretic.   HENT:      Right Ear: External ear normal.      Left Ear: External ear normal.   Eyes:      Conjunctiva/sclera: Conjunctivae normal.   Cardiovascular:      Rate and Rhythm: Normal rate and regular rhythm.      Heart sounds: Normal heart sounds.   Pulmonary:      Effort: Pulmonary effort is normal. No respiratory distress.      Breath sounds: No wheezing or rales.   Abdominal:      General: Abdomen is flat. There is no distension.      Palpations: Abdomen is soft.      Tenderness: There is abdominal tenderness in the left upper quadrant. There is right CVA tenderness and left CVA tenderness. There is no guarding or rebound.   Neurological:      General: No focal deficit present.      Mental Status: She is alert. Mental status is at baseline.         LAB:  All pertinent labs reviewed and interpreted.  Labs Ordered and Resulted from Time of ED Arrival to Time of ED Departure   BASIC METABOLIC PANEL - Abnormal       Result Value    Sodium 139      Potassium 5.0      Chloride 103      Carbon Dioxide (CO2) 32 (*)     Anion Gap 4 (*)     Urea Nitrogen 7.7      Creatinine 0.98 (*)     Calcium 8.5 (*)     Glucose 72      GFR Estimate 78     CBC WITH " PLATELETS AND DIFFERENTIAL - Abnormal    WBC Count 9.4      RBC Count 4.05      Hemoglobin 11.4 (*)     Hematocrit 35.3      MCV 87      MCH 28.1      MCHC 32.3      RDW 15.7 (*)     Platelet Count 203      % Neutrophils 61      % Lymphocytes 27      % Monocytes 8      % Eosinophils 3      % Basophils 0      % Immature Granulocytes 1      NRBCs per 100 WBC 0      Absolute Neutrophils 5.8      Absolute Lymphocytes 2.6      Absolute Monocytes 0.7      Absolute Eosinophils 0.3      Absolute Basophils 0.0      Absolute Immature Granulocytes 0.1      Absolute NRBCs 0.0     ROUTINE UA WITH MICROSCOPIC REFLEX TO CULTURE - Abnormal    Color Urine Light Yellow      Appearance Urine Clear      Glucose Urine Negative      Bilirubin Urine Negative      Ketones Urine Negative      Specific Gravity Urine 1.017      Blood Urine Negative      pH Urine 5.5      Protein Albumin Urine Negative      Urobilinogen Urine <2.0      Nitrite Urine Negative      Leukocyte Esterase Urine 500 [place]/uL (*)     Bacteria Urine Few (*)     Mucus Urine Present (*)     RBC Urine 1      WBC Urine 13 (*)     Squamous Epithelials Urine 1      Transitional Epithelials Urine 1      Hyaline Casts Urine 1     HEPATIC FUNCTION PANEL - Normal    Protein Total 6.6      Albumin 3.9      Bilirubin Total <0.2      Alkaline Phosphatase 53      AST 17      ALT 12      Bilirubin Direct <0.20     LIPASE - Normal    Lipase 14     HCG QUALITATIVE PREGNANCY - Normal    hCG Serum Qualitative Negative     URINE CULTURE       RADIOLOGY:  Reviewed all pertinent imaging. Please see official radiology report.  CT Abdomen Pelvis w Contrast   Final Result   IMPRESSION:    1.  No obstruction, colitis, diverticulitis, or appendicitis.   2.  No CT findings explain the patient's left upper quadrant pain. No obstructing renal or ureteral stones.        PROCEDURES:   None.     Yuval Rosario PA-C  Emergency Medicine  [facility]  EMERGENCY DEPARTMENT  [address] [place] [id number]  [phone]  Dept: [phone]     Yuval Rosario PA-C  11/13/22 1525

## 2022-11-15 NOTE — ED PROVIDER NOTES
Called and discussed results of negative urine culture. She was discharged home on antibiotics for UTI and I recommended stopping the medication and she was in agreement and understanding. All questions were answered to the best of my ability.     DINORA Dotson Sarah E, PA-C  11/15/22 0913

## 2022-12-28 NOTE — PROGRESS NOTES
Speech-Language Pathology: Video Swallow Study     12/28/22 1000   General Information   Type Of Visit Initial   Start Of Care Date 12/28/22   Referring Physician Dr. Vashti Layne Evaluate And Treat   Medical Diagnosis Esophageal dysphagia   Onset Of Illness/injury Or Date Of Surgery 10/17/22   Pertinent History of Current Problem/OT: Additional Occupational Profile Info Patient is a 32 year old female, here for a VFSS and esophagram from her group home. She has a history of choking on chips due to reflux/retrograde flow per records from admit in 10/2022. She had a VFSS and esopahgram at that time showing a patulous esophagus and reflux to the thoracic inlet. There was no significant concern for pharyngeal dysphagia, patient was seen for strategies to reduce esophageal dysphagia symptoms and was tolerating a regular diet and thin liquids at d/c. She reports recent dysphagia prompting evaluation. She describes a burning sensation in her throat when she swallows, most recently on a piece of chicken. She denies sensation of coughing or choking on food or liquids and denies food feeling like it is sticking.   Living Environment Group Home   General Observations Alert and cooperative, vague historian but was able to answer questions appropriately during consult   Clinical Swallow Evaluation   Oral Musculature generally intact   Structural Abnormalities none present   Dentition present and adequate   Mucosal Quality good   Mandibular Strength and Mobility intact   Oral Labial Strength and Mobility WFL;impaired seal  (question slightly reduced labial seal vs reduced effort with task or facial/body habitus)   Lingual Strength and Mobility WFL   Buccal Strength and Mobility intact   Laryngeal Function   (Mildly hoarse voice noted; patient endorsed this as well)   Oral Musculature Comments Clinical evaluation completed prior to VFSS with oral motor exam and consult to determine history and symptoms. Oral motor function is  WFL-WNL with no concerns. Symptoms are most consistent with history of esophageal dysphagia but VFSS is warranted to rule out pharyngeal dysphagia and impact of esophageal dysphagia on pharyngeal function and symptoms.   VFSS Evaluation   VFSS Additional Documentation Yes   VFSS Eval: Radiology   Radiologist Dr. Juarez   Views Taken left lateral;A/P  (A/P view noted during esophagram)   Physical Location of Procedure [facility]   VFSS Eval: Thin Liquid Texture Trial   Mode of Presentation, Thin Liquid cup;straw;self-fed   Preparatory Phase WFL   Oral Phase, Thin Liquid WFL   Pharyngeal Phase, Thin Liquid WFL   Rosenbek's Penetration Aspiration Scale: Thin Liquid Trial Results 2 - contrast enters airway, remains above the vocal cords, no residue remains (penetration)   Diagnostic Statement Periodic epiglottic undercoating/slight shallow penetration with repetitive sips of thin liquids only   VFSS Eval: Mildly Thick Liquids    Mode of Presentation cup;fed by clinician   Preparatory Phase WFL   Oral Phase WFL   Pharyngeal Phase WFL   Rosenbek's Penetration Aspiration Scale 1 - no aspiration, contrast does not enter airway   VFSS Eval: Regular Texture Trial (Solid)   Mode of Presentation self-fed   Preparatory Phase WFL   Oral Phase WFL   Pharyngeal Phase WFL   Rosenbek's Penetration Aspiration Scale 1 - no aspiration, contrast does not enter airway   Esophageal Phase of Swallow   Patient reports or presents with symptoms of esophageal dysphagia Yes   Esophageal sweep performed during today s vidofluoroscopic exam  Please refer to radiologist's report for details;Yes   Esophageal comments Aspiration noted during esophagram with drinking while lying down; This is not considered significant given positional aspiration. Functionally silent aspiration but light, delayed throat clear noted. Patient was educated on symptoms to reduce esophageal dysphagia symptoms and aspiration risk while lying down.   Swallow Eval: Clinical  Impressions   Skilled Criteria for Therapy Intervention No problems identified which require skilled intervention   Diet texture recommendations Thin liquids (level 0);Regular diet   Recommended Feeding/Eating Techniques alternate between small bites and sips of food/liquid;small sips/bites;maintain upright posture during/after eating for 30 mins   Demonstrates Need for Referral to Another Service other (see comments)  (Continue to follow with GI)   Risks and Benefits of Treatment have been explained. Yes   Patient, family and/or staff in agreement with Plan of Care Yes   Clinical Impression Comments Video Swallow Study completed. Patient had no aspiration during VFSS or any significant penetration. Oropharyngeal swallow function is essentially WNL. Tongue base retraction is WNL. Pharyngeal constriction, hyolaryngeal elevation and excursion were all WNL. Epiglottic inversion is complete. Swallow response is timely. Mastication is safe and complete. There is a mild impression on esophagus from cervical spine (near C7) but does not appear to impact function or safety.  Recommend Regular diet and Thin liquids with alternating solids and liquids, smaller more frequent meals as needed, upright posture for 30-60 minutes after intake and slow rate of intake.   Total Session Time   SLP Eval: oral/pharyngeal swallow function, clinical minutes (72681) 38   SLP Eval: VideoFluoroscopic Swallow function Minutes (70700) 59